# Patient Record
Sex: MALE | Race: BLACK OR AFRICAN AMERICAN | NOT HISPANIC OR LATINO | URBAN - METROPOLITAN AREA
[De-identification: names, ages, dates, MRNs, and addresses within clinical notes are randomized per-mention and may not be internally consistent; named-entity substitution may affect disease eponyms.]

---

## 2017-05-09 ENCOUNTER — INPATIENT (INPATIENT)
Facility: HOSPITAL | Age: 52
LOS: 0 days | Discharge: LEFT BEFORE DISCHARGE | End: 2017-05-10
Attending: SPECIALIST | Admitting: SPECIALIST
Payer: COMMERCIAL

## 2017-05-09 VITALS
TEMPERATURE: 98 F | OXYGEN SATURATION: 100 % | RESPIRATION RATE: 18 BRPM | HEART RATE: 80 BPM | DIASTOLIC BLOOD PRESSURE: 75 MMHG | SYSTOLIC BLOOD PRESSURE: 135 MMHG

## 2017-05-09 DIAGNOSIS — M62.89 OTHER SPECIFIED DISORDERS OF MUSCLE: ICD-10-CM

## 2017-05-09 LAB
ALBUMIN SERPL ELPH-MCNC: 3.7 G/DL — SIGNIFICANT CHANGE UP (ref 3.3–5)
ALP SERPL-CCNC: 28 U/L — LOW (ref 40–120)
ALT FLD-CCNC: 30 U/L — SIGNIFICANT CHANGE UP (ref 4–41)
APPEARANCE UR: CLEAR — SIGNIFICANT CHANGE UP
AST SERPL-CCNC: 34 U/L — SIGNIFICANT CHANGE UP (ref 4–40)
BASE EXCESS BLDV CALC-SCNC: 5.8 MMOL/L — SIGNIFICANT CHANGE UP
BASOPHILS # BLD AUTO: 0.02 K/UL — SIGNIFICANT CHANGE UP (ref 0–0.2)
BASOPHILS NFR BLD AUTO: 0.3 % — SIGNIFICANT CHANGE UP (ref 0–2)
BILIRUB SERPL-MCNC: 0.3 MG/DL — SIGNIFICANT CHANGE UP (ref 0.2–1.2)
BILIRUB UR-MCNC: NEGATIVE — SIGNIFICANT CHANGE UP
BLOOD GAS VENOUS - CREATININE: 1.28 MG/DL — SIGNIFICANT CHANGE UP (ref 0.5–1.3)
BLOOD UR QL VISUAL: NEGATIVE — SIGNIFICANT CHANGE UP
BUN SERPL-MCNC: 11 MG/DL — SIGNIFICANT CHANGE UP (ref 7–23)
CALCIUM SERPL-MCNC: 8.9 MG/DL — SIGNIFICANT CHANGE UP (ref 8.4–10.5)
CHLORIDE BLDV-SCNC: 100 MMOL/L — SIGNIFICANT CHANGE UP (ref 96–108)
CHLORIDE SERPL-SCNC: 96 MMOL/L — LOW (ref 98–107)
CK SERPL-CCNC: 391 U/L — HIGH (ref 30–200)
CO2 SERPL-SCNC: 24 MMOL/L — SIGNIFICANT CHANGE UP (ref 22–31)
COLOR SPEC: SIGNIFICANT CHANGE UP
CREAT SERPL-MCNC: 1.2 MG/DL — SIGNIFICANT CHANGE UP (ref 0.5–1.3)
CRP SERPL-MCNC: 100.8 MG/L — HIGH (ref 0.3–5)
EOSINOPHIL # BLD AUTO: 0.15 K/UL — SIGNIFICANT CHANGE UP (ref 0–0.5)
EOSINOPHIL NFR BLD AUTO: 2 % — SIGNIFICANT CHANGE UP (ref 0–6)
ERYTHROCYTE [SEDIMENTATION RATE] IN BLOOD: 20 MM/HR — HIGH (ref 1–15)
GAS PNL BLDV: 135 MMOL/L — LOW (ref 136–146)
GLUCOSE BLDV-MCNC: 93 — SIGNIFICANT CHANGE UP (ref 70–99)
GLUCOSE SERPL-MCNC: 92 MG/DL — SIGNIFICANT CHANGE UP (ref 70–99)
GLUCOSE UR-MCNC: NEGATIVE — SIGNIFICANT CHANGE UP
HCO3 BLDV-SCNC: 27 MMOL/L — SIGNIFICANT CHANGE UP (ref 20–27)
HCT VFR BLD CALC: 47.4 % — SIGNIFICANT CHANGE UP (ref 39–50)
HCT VFR BLDV CALC: 49.9 % — SIGNIFICANT CHANGE UP (ref 39–51)
HGB BLD-MCNC: 16.2 G/DL — SIGNIFICANT CHANGE UP (ref 13–17)
HGB BLDV-MCNC: 16.3 G/DL — SIGNIFICANT CHANGE UP (ref 13–17)
IMM GRANULOCYTES NFR BLD AUTO: 0.1 % — SIGNIFICANT CHANGE UP (ref 0–1.5)
KETONES UR-MCNC: NEGATIVE — SIGNIFICANT CHANGE UP
LACTATE BLDV-MCNC: 1 MMOL/L — SIGNIFICANT CHANGE UP (ref 0.5–2)
LEUKOCYTE ESTERASE UR-ACNC: NEGATIVE — SIGNIFICANT CHANGE UP
LYMPHOCYTES # BLD AUTO: 1.71 K/UL — SIGNIFICANT CHANGE UP (ref 1–3.3)
LYMPHOCYTES # BLD AUTO: 22.5 % — SIGNIFICANT CHANGE UP (ref 13–44)
MAGNESIUM SERPL-MCNC: 2 MG/DL — SIGNIFICANT CHANGE UP (ref 1.6–2.6)
MCHC RBC-ENTMCNC: 29.8 PG — SIGNIFICANT CHANGE UP (ref 27–34)
MCHC RBC-ENTMCNC: 34.2 % — SIGNIFICANT CHANGE UP (ref 32–36)
MCV RBC AUTO: 87.3 FL — SIGNIFICANT CHANGE UP (ref 80–100)
MONOCYTES # BLD AUTO: 1.19 K/UL — HIGH (ref 0–0.9)
MONOCYTES NFR BLD AUTO: 15.6 % — HIGH (ref 2–14)
NEUTROPHILS # BLD AUTO: 4.53 K/UL — SIGNIFICANT CHANGE UP (ref 1.8–7.4)
NEUTROPHILS NFR BLD AUTO: 59.5 % — SIGNIFICANT CHANGE UP (ref 43–77)
NITRITE UR-MCNC: NEGATIVE — SIGNIFICANT CHANGE UP
PCO2 BLDV: 56 MMHG — HIGH (ref 41–51)
PH BLDV: 7.36 PH — SIGNIFICANT CHANGE UP (ref 7.32–7.43)
PH UR: 7 — SIGNIFICANT CHANGE UP (ref 4.6–8)
PLATELET # BLD AUTO: 240 K/UL — SIGNIFICANT CHANGE UP (ref 150–400)
PMV BLD: 10.6 FL — SIGNIFICANT CHANGE UP (ref 7–13)
PO2 BLDV: 37 MMHG — SIGNIFICANT CHANGE UP (ref 35–40)
POTASSIUM BLDV-SCNC: 5.2 MMOL/L — HIGH (ref 3.4–4.5)
POTASSIUM SERPL-MCNC: 4.5 MMOL/L — SIGNIFICANT CHANGE UP (ref 3.5–5.3)
POTASSIUM SERPL-SCNC: 4.5 MMOL/L — SIGNIFICANT CHANGE UP (ref 3.5–5.3)
PROT SERPL-MCNC: 7.6 G/DL — SIGNIFICANT CHANGE UP (ref 6–8.3)
PROT UR-MCNC: 10 — SIGNIFICANT CHANGE UP
RBC # BLD: 5.43 M/UL — SIGNIFICANT CHANGE UP (ref 4.2–5.8)
RBC # FLD: 14.1 % — SIGNIFICANT CHANGE UP (ref 10.3–14.5)
RBC CASTS # UR COMP ASSIST: SIGNIFICANT CHANGE UP (ref 0–?)
SAO2 % BLDV: 64.3 % — SIGNIFICANT CHANGE UP (ref 60–85)
SODIUM SERPL-SCNC: 135 MMOL/L — SIGNIFICANT CHANGE UP (ref 135–145)
SP GR SPEC: > 1.04 — HIGH (ref 1–1.03)
UROBILINOGEN FLD QL: NORMAL E.U. — SIGNIFICANT CHANGE UP (ref 0.1–0.2)
WBC # BLD: 7.61 K/UL — SIGNIFICANT CHANGE UP (ref 3.8–10.5)
WBC # FLD AUTO: 7.61 K/UL — SIGNIFICANT CHANGE UP (ref 3.8–10.5)

## 2017-05-09 PROCEDURE — 73090 X-RAY EXAM OF FOREARM: CPT | Mod: 26,LT

## 2017-05-09 PROCEDURE — 93971 EXTREMITY STUDY: CPT | Mod: 26

## 2017-05-09 PROCEDURE — 71020: CPT | Mod: 26

## 2017-05-09 PROCEDURE — 73060 X-RAY EXAM OF HUMERUS: CPT | Mod: 26,LT

## 2017-05-09 PROCEDURE — 73201 CT UPPER EXTREMITY W/DYE: CPT | Mod: 26,LT

## 2017-05-09 RX ORDER — SODIUM CHLORIDE 9 MG/ML
3000 INJECTION INTRAMUSCULAR; INTRAVENOUS; SUBCUTANEOUS ONCE
Qty: 0 | Refills: 0 | Status: COMPLETED | OUTPATIENT
Start: 2017-05-09 | End: 2017-05-09

## 2017-05-09 RX ORDER — ACETAMINOPHEN 500 MG
1000 TABLET ORAL ONCE
Qty: 0 | Refills: 0 | Status: COMPLETED | OUTPATIENT
Start: 2017-05-09 | End: 2017-05-09

## 2017-05-09 RX ORDER — PIPERACILLIN AND TAZOBACTAM 4; .5 G/20ML; G/20ML
3.38 INJECTION, POWDER, LYOPHILIZED, FOR SOLUTION INTRAVENOUS ONCE
Qty: 0 | Refills: 0 | Status: COMPLETED | OUTPATIENT
Start: 2017-05-09 | End: 2017-05-09

## 2017-05-09 RX ORDER — SODIUM CHLORIDE 9 MG/ML
1000 INJECTION, SOLUTION INTRAVENOUS
Qty: 0 | Refills: 0 | Status: DISCONTINUED | OUTPATIENT
Start: 2017-05-09 | End: 2017-05-10

## 2017-05-09 RX ORDER — VANCOMYCIN HCL 1 G
1000 VIAL (EA) INTRAVENOUS ONCE
Qty: 0 | Refills: 0 | Status: COMPLETED | OUTPATIENT
Start: 2017-05-09 | End: 2017-05-09

## 2017-05-09 RX ORDER — ENOXAPARIN SODIUM 100 MG/ML
40 INJECTION SUBCUTANEOUS DAILY
Qty: 0 | Refills: 0 | Status: DISCONTINUED | OUTPATIENT
Start: 2017-05-09 | End: 2017-05-10

## 2017-05-09 RX ORDER — HYDROMORPHONE HYDROCHLORIDE 2 MG/ML
1 INJECTION INTRAMUSCULAR; INTRAVENOUS; SUBCUTANEOUS EVERY 4 HOURS
Qty: 0 | Refills: 0 | Status: DISCONTINUED | OUTPATIENT
Start: 2017-05-09 | End: 2017-05-10

## 2017-05-09 RX ORDER — MORPHINE SULFATE 50 MG/1
4 CAPSULE, EXTENDED RELEASE ORAL ONCE
Qty: 0 | Refills: 0 | Status: DISCONTINUED | OUTPATIENT
Start: 2017-05-09 | End: 2017-05-09

## 2017-05-09 RX ADMIN — SODIUM CHLORIDE 1500 MILLILITER(S): 9 INJECTION INTRAMUSCULAR; INTRAVENOUS; SUBCUTANEOUS at 09:07

## 2017-05-09 RX ADMIN — MORPHINE SULFATE 4 MILLIGRAM(S): 50 CAPSULE, EXTENDED RELEASE ORAL at 11:05

## 2017-05-09 RX ADMIN — MORPHINE SULFATE 4 MILLIGRAM(S): 50 CAPSULE, EXTENDED RELEASE ORAL at 11:25

## 2017-05-09 RX ADMIN — HYDROMORPHONE HYDROCHLORIDE 1 MILLIGRAM(S): 2 INJECTION INTRAMUSCULAR; INTRAVENOUS; SUBCUTANEOUS at 19:44

## 2017-05-09 RX ADMIN — Medication 250 MILLIGRAM(S): at 11:05

## 2017-05-09 RX ADMIN — MORPHINE SULFATE 4 MILLIGRAM(S): 50 CAPSULE, EXTENDED RELEASE ORAL at 07:39

## 2017-05-09 RX ADMIN — MORPHINE SULFATE 4 MILLIGRAM(S): 50 CAPSULE, EXTENDED RELEASE ORAL at 09:47

## 2017-05-09 RX ADMIN — Medication 100 MILLIGRAM(S): at 09:07

## 2017-05-09 RX ADMIN — Medication 1000 MILLIGRAM(S): at 07:39

## 2017-05-09 RX ADMIN — SODIUM CHLORIDE 125 MILLILITER(S): 9 INJECTION, SOLUTION INTRAVENOUS at 14:02

## 2017-05-09 RX ADMIN — HYDROMORPHONE HYDROCHLORIDE 1 MILLIGRAM(S): 2 INJECTION INTRAMUSCULAR; INTRAVENOUS; SUBCUTANEOUS at 18:44

## 2017-05-09 RX ADMIN — Medication 400 MILLIGRAM(S): at 07:13

## 2017-05-09 RX ADMIN — PIPERACILLIN AND TAZOBACTAM 200 GRAM(S): 4; .5 INJECTION, POWDER, LYOPHILIZED, FOR SOLUTION INTRAVENOUS at 10:08

## 2017-05-09 RX ADMIN — HYDROMORPHONE HYDROCHLORIDE 1 MILLIGRAM(S): 2 INJECTION INTRAMUSCULAR; INTRAVENOUS; SUBCUTANEOUS at 14:34

## 2017-05-09 RX ADMIN — ENOXAPARIN SODIUM 40 MILLIGRAM(S): 100 INJECTION SUBCUTANEOUS at 18:44

## 2017-05-09 RX ADMIN — HYDROMORPHONE HYDROCHLORIDE 1 MILLIGRAM(S): 2 INJECTION INTRAMUSCULAR; INTRAVENOUS; SUBCUTANEOUS at 14:02

## 2017-05-09 RX ADMIN — HYDROMORPHONE HYDROCHLORIDE 1 MILLIGRAM(S): 2 INJECTION INTRAMUSCULAR; INTRAVENOUS; SUBCUTANEOUS at 23:45

## 2017-05-09 RX ADMIN — MORPHINE SULFATE 4 MILLIGRAM(S): 50 CAPSULE, EXTENDED RELEASE ORAL at 09:07

## 2017-05-09 RX ADMIN — MORPHINE SULFATE 4 MILLIGRAM(S): 50 CAPSULE, EXTENDED RELEASE ORAL at 07:08

## 2017-05-09 NOTE — H&P ADULT. - ASSESSMENT
Assessment:	This patient is assessed as being a 51 year-old previously healthy male who has complaints of having LUE swelling and pain consistent with a intramuscular hematoma

## 2017-05-09 NOTE — ED PROVIDER NOTE - SHIFT CHANGE DETAILS
I have signed over this patient to the above attending physician. Pertinent history, physical exam findings and workup thus far in the ED have been discussed. The pending tests and plan, including duplex, ct were signed over.  All questions from the above attending physician have been answered.

## 2017-05-09 NOTE — ED PROCEDURE NOTE - PROCEDURE ADDITIONAL DETAILS
11640, ultrasound, soft tissue, limited.    Focused ED ultrasound of the soft tissue to evaluate for abscess:  location: left tricep  focal hypoechoic lesion, well circumscrbied with internal echoes, appears in the muscle    Impression: likely intramuscular abscess of left tricep    Procedure note and images placed in patient's chart

## 2017-05-09 NOTE — ED PROVIDER NOTE - PROGRESS NOTE DETAILS
ATTG NOTE DR. BERNABE Pt assessed and c/o pain to extremity, no new numbness or tingling.  +erythema to skin and warmth, 2+radial, no cool extremity.  Will expedite CT and call orthopedics/surgery consult to eval for compartment syndrome.  WIll obtain lactate, give 30cc/kg fluids.

## 2017-05-09 NOTE — ED PROVIDER NOTE - MEDICAL DECISION MAKING DETAILS
52 yo M w/ hx of GERD c/o left arm pain starting last Thursday, after working out his triceps. Pt doesn't recall pulling any muscle, reports swelling, redness, warmth from the triceps down to the forearm. Pt also reports subjective fever and chills. Denies any trauma to the arm, recent travel, hx of blood clots. Pt also reports Left ear pain, muffled sounds, but ringing or discharge. Denies any chest pain, sob or any other symptoms.   - Labs, US, CT, pain meds

## 2017-05-09 NOTE — ED PROVIDER NOTE - OBJECTIVE STATEMENT
52 yo M w/ hx of GERD c/o left arm pain starting last Thursday, after working out his triceps. Pt doesn't recall pulling any muscle, reports swelling, redness, warmth from the triceps down to the forearm. Pt also reports subjective fever and chills. Denies any trauma to the arm, recent travel, hx of blood clots. Pt also reports Left ear pain, muffled sounds, but ringing or discharge. Denies any chest pain, sob or any other symptoms.

## 2017-05-09 NOTE — ED ADULT NURSE NOTE - OBJECTIVE STATEMENT
pt. came in c/o swelling and pain on left arm since Thursday and is getting worse and that arm feels warm. pt. states he was working out Thursday and started having the pain on arm.  pt. also c/o pain on his rt hand started today, had felt nausea last weekend and felt feverish and chills yesterday. pt. has also been c/o muffled sound heard on left ear, denies any ear discharges nor trauma. pt. denies any trauma nor numbness, good radial pulses noted on b/l arm, with good cap refill. pt. had hx of fever, cough and colds 2 weeks ago and took zpak with relief. pt. currently denies any nausea, no vomiting. MD at bedside to further eval pt. will continue to provide nsg care

## 2017-05-09 NOTE — ED ADULT TRIAGE NOTE - CHIEF COMPLAINT QUOTE
Pt c/o multiple symptoms starting approx 2 days ago after taking a Z-pack for cold symptoms.  Pt c/o L ear pain, fever, nausea, and L arm swelling.  L arm noticeably more swollen than R arm.  Feels tight, skin taut.  Denies injury to affected to extremity.   +radial pulse, +ROM.  Denies any chest pain/SOB.  Respirations even and unlabored in triage.  PMHx:  GERD

## 2017-05-09 NOTE — ED PROVIDER NOTE - NOTES
Case discussed with orthopedics who states we should consult surgery to evaluate for soft tissue abscess.

## 2017-05-09 NOTE — H&P ADULT. - HISTORY OF PRESENT ILLNESS
This is a very pleasant 51 year old male who presents with left upper extremity swelling and pain which began the Thursday prior to admission, approximately 6 days. He states he was weight lifting when he first noticed the swelling the day following his session. This has never occurred before. He describes the pain as sharp non radiating and constant 6/10. No alleviating factors could be elicited. He denies any recent trauma or injections to that area. The pain was with fever or chills. He has not had any recent sick exposures and denied having had foreign travel. He has not had urinary symptoms of urgency, frequency or dysuria.     He has no significant medical history except GERD.  He has not previously had any surgery. He denied having had cardiovascular, pulmonary, renal, hepatic, hematologic, CNS, endocrine and / or neoplastic illnesses. Prior to the current hospitalization He did not take any medications. He has no known medication allergies and he is not allergic to latex. He does not use ethanol or tobacco.    CT LUE with contrast demonstrated: Broad elongated flat rim-enhancing fluid collection in the perifascial region over the mid to distal posterior triceps with suggestion of vague internal foci of amorphous increased attenuation material could be compatible with hematoma, however, concomitant superimposed infection/abscess cannot be entirely excluded. No tracking gas collections or additional fluid collections. Mild swelling and stranding in the adjacent subcutaneous tissues compatible with associated inflammatory reaction. No CT evidence for osteomyelitis or septic arthritis. Incidentally observed thinly encapsulated simple lipoma in the upper posterior left shoulder subcutaneous tissues over the posterior deltoid and teres minor muscles.    He was awake, alert and in no distress  He was anicteric and he did not have thrush. His oropharyngeal mucosa was normal. He had reactive pupils and his extra-occular movements were intact. He did not have JVD. His lungs were clear bilaterally and He had non-labored respirations. He had symmetrical chest wall movements. He had regular heart tones and he did not have a murmur or gallop. His abdomen was soft, non distended without tenderness  There were no palpable masses or abdominal wall hernias. He had active bowel sounds. He had normal external genitalia. He did not have a rash. His extremities were well perfused. He had left upper extremity erythema, swelling, and tenderness of the posterior upper arm.     A 22 gauge IV cannula was inserted into a vein of his right arm and fluid was given.     Assessment:	This patient is assessed as being a 51 year-old previously healthy male who has complaints of having LUE swelling and pain consistent with a intramuscular hematoma     Plan to:  1)	Admit him to the [facility] under Dr. Arita   2) 	Keep him NPO and give parenteral fluid to treat his dehydration.  3)	Treat him with parenteral antibiotics pre-operatively,  Zosyn 3.375mg IV Q8 hours and Vancomycin   4)	Give narcotics to treat his acute pain PRN  5)	Plan to have him undergo a bedside verus operative surgical incision and debridement   6)	Full support in place

## 2017-05-09 NOTE — H&P ADULT. - PROBLEM SELECTOR PLAN 1
Plan to:  1)	Admit him to the [facility] under Dr. Arita   2) 	Keep him NPO and give parenteral fluid to treat his dehydration.  3)	Treat him with parenteral antibiotics pre-operatively,  Zosyn 3.375mg IV Q8 hours and Vancomycin   4)	Give narcotics to treat his acute pain PRN  5)	Plan to have him undergo a bedside verus operative surgical incision and debridement   6)	Full support in place

## 2017-05-09 NOTE — ED PROVIDER NOTE - ATTENDING CONTRIBUTION TO CARE
51m, pmhx gerd. p/w 2 days of left arm swelling, pain and erythema over the triceps. also notes uri symptoms x 1 week and subjective fevers intermittently over the week. no ivdu. no wt loss. pt goes to the gym daily but does not recall any injury. exam, vs wnl, nad. hs and lungs normaal, abdo benign. there is tenderness, edema and erythema over the tricep, with some mild swelling to left arm. pulses intact. rest  of extremities normal, neuro non-focal. bedside us shows possible intramuscular abscess to left tricep. therefore will do bloods with esr, crp, ck, CT of arm and LUE duplex. pain control.

## 2017-05-10 ENCOUNTER — INPATIENT (INPATIENT)
Facility: HOSPITAL | Age: 52
LOS: 1 days | Discharge: LEFT BEFORE DISCHARGE | End: 2017-05-12
Attending: SPECIALIST | Admitting: SPECIALIST

## 2017-05-10 VITALS
OXYGEN SATURATION: 94 % | DIASTOLIC BLOOD PRESSURE: 77 MMHG | SYSTOLIC BLOOD PRESSURE: 149 MMHG | HEART RATE: 55 BPM | TEMPERATURE: 98 F | RESPIRATION RATE: 17 BRPM

## 2017-05-10 VITALS
DIASTOLIC BLOOD PRESSURE: 88 MMHG | TEMPERATURE: 98 F | RESPIRATION RATE: 16 BRPM | SYSTOLIC BLOOD PRESSURE: 161 MMHG | HEART RATE: 58 BPM | OXYGEN SATURATION: 100 %

## 2017-05-10 DIAGNOSIS — L02.419 CUTANEOUS ABSCESS OF LIMB, UNSPECIFIED: ICD-10-CM

## 2017-05-10 LAB
ALBUMIN SERPL ELPH-MCNC: 3.8 G/DL — SIGNIFICANT CHANGE UP (ref 3.3–5)
ALP SERPL-CCNC: 38 U/L — LOW (ref 40–120)
ALT FLD-CCNC: 24 U/L — SIGNIFICANT CHANGE UP (ref 4–41)
AST SERPL-CCNC: 30 U/L — SIGNIFICANT CHANGE UP (ref 4–40)
BASE EXCESS BLDV CALC-SCNC: 6.4 MMOL/L — SIGNIFICANT CHANGE UP
BASOPHILS # BLD AUTO: 0.04 K/UL — SIGNIFICANT CHANGE UP (ref 0–0.2)
BASOPHILS NFR BLD AUTO: 0.8 % — SIGNIFICANT CHANGE UP (ref 0–2)
BILIRUB SERPL-MCNC: 0.3 MG/DL — SIGNIFICANT CHANGE UP (ref 0.2–1.2)
BLOOD GAS VENOUS - CREATININE: 1.34 MG/DL — HIGH (ref 0.5–1.3)
BUN SERPL-MCNC: 12 MG/DL — SIGNIFICANT CHANGE UP (ref 7–23)
CALCIUM SERPL-MCNC: 9.3 MG/DL — SIGNIFICANT CHANGE UP (ref 8.4–10.5)
CHLORIDE BLDV-SCNC: 101 MMOL/L — SIGNIFICANT CHANGE UP (ref 96–108)
CHLORIDE SERPL-SCNC: 100 MMOL/L — SIGNIFICANT CHANGE UP (ref 98–107)
CO2 SERPL-SCNC: 27 MMOL/L — SIGNIFICANT CHANGE UP (ref 22–31)
CREAT SERPL-MCNC: 1.46 MG/DL — HIGH (ref 0.5–1.3)
EOSINOPHIL # BLD AUTO: 0.12 K/UL — SIGNIFICANT CHANGE UP (ref 0–0.5)
EOSINOPHIL NFR BLD AUTO: 2.5 % — SIGNIFICANT CHANGE UP (ref 0–6)
GAS PNL BLDV: 140 MMOL/L — SIGNIFICANT CHANGE UP (ref 136–146)
GLUCOSE BLDV-MCNC: 78 — SIGNIFICANT CHANGE UP (ref 70–99)
GLUCOSE SERPL-MCNC: 75 MG/DL — SIGNIFICANT CHANGE UP (ref 70–99)
HCO3 BLDV-SCNC: 26 MMOL/L — SIGNIFICANT CHANGE UP (ref 20–27)
HCT VFR BLD CALC: 45.7 % — SIGNIFICANT CHANGE UP (ref 39–50)
HCT VFR BLDV CALC: 49.5 % — SIGNIFICANT CHANGE UP (ref 39–51)
HGB BLD-MCNC: 15.6 G/DL — SIGNIFICANT CHANGE UP (ref 13–17)
HGB BLDV-MCNC: 16.2 G/DL — SIGNIFICANT CHANGE UP (ref 13–17)
IMM GRANULOCYTES NFR BLD AUTO: 0.2 % — SIGNIFICANT CHANGE UP (ref 0–1.5)
LACTATE BLDV-MCNC: 2.7 MMOL/L — HIGH (ref 0.5–2)
LYMPHOCYTES # BLD AUTO: 1.45 K/UL — SIGNIFICANT CHANGE UP (ref 1–3.3)
LYMPHOCYTES # BLD AUTO: 30.4 % — SIGNIFICANT CHANGE UP (ref 13–44)
MCHC RBC-ENTMCNC: 30 PG — SIGNIFICANT CHANGE UP (ref 27–34)
MCHC RBC-ENTMCNC: 34.1 % — SIGNIFICANT CHANGE UP (ref 32–36)
MCV RBC AUTO: 87.9 FL — SIGNIFICANT CHANGE UP (ref 80–100)
MONOCYTES # BLD AUTO: 0.77 K/UL — SIGNIFICANT CHANGE UP (ref 0–0.9)
MONOCYTES NFR BLD AUTO: 16.1 % — HIGH (ref 2–14)
NEUTROPHILS # BLD AUTO: 2.38 K/UL — SIGNIFICANT CHANGE UP (ref 1.8–7.4)
NEUTROPHILS NFR BLD AUTO: 50 % — SIGNIFICANT CHANGE UP (ref 43–77)
PCO2 BLDV: 76 MMHG — HIGH (ref 41–51)
PH BLDV: 7.26 PH — LOW (ref 7.32–7.43)
PLATELET # BLD AUTO: 265 K/UL — SIGNIFICANT CHANGE UP (ref 150–400)
PMV BLD: 10.9 FL — SIGNIFICANT CHANGE UP (ref 7–13)
PO2 BLDV: 40 MMHG — SIGNIFICANT CHANGE UP (ref 35–40)
POTASSIUM BLDV-SCNC: 4 MMOL/L — SIGNIFICANT CHANGE UP (ref 3.4–4.5)
POTASSIUM SERPL-MCNC: 4.5 MMOL/L — SIGNIFICANT CHANGE UP (ref 3.5–5.3)
POTASSIUM SERPL-SCNC: 4.5 MMOL/L — SIGNIFICANT CHANGE UP (ref 3.5–5.3)
PROT SERPL-MCNC: 7.6 G/DL — SIGNIFICANT CHANGE UP (ref 6–8.3)
RBC # BLD: 5.2 M/UL — SIGNIFICANT CHANGE UP (ref 4.2–5.8)
RBC # FLD: 13.7 % — SIGNIFICANT CHANGE UP (ref 10.3–14.5)
SAO2 % BLDV: 59.3 % — LOW (ref 60–85)
SODIUM SERPL-SCNC: 144 MMOL/L — SIGNIFICANT CHANGE UP (ref 135–145)
WBC # BLD: 4.77 K/UL — SIGNIFICANT CHANGE UP (ref 3.8–10.5)
WBC # FLD AUTO: 4.77 K/UL — SIGNIFICANT CHANGE UP (ref 3.8–10.5)

## 2017-05-10 RX ORDER — SODIUM CHLORIDE 9 MG/ML
1000 INJECTION INTRAMUSCULAR; INTRAVENOUS; SUBCUTANEOUS ONCE
Qty: 0 | Refills: 0 | Status: COMPLETED | OUTPATIENT
Start: 2017-05-10 | End: 2017-05-10

## 2017-05-10 RX ORDER — MORPHINE SULFATE 50 MG/1
4 CAPSULE, EXTENDED RELEASE ORAL ONCE
Qty: 0 | Refills: 0 | Status: DISCONTINUED | OUTPATIENT
Start: 2017-05-10 | End: 2017-05-10

## 2017-05-10 RX ORDER — VANCOMYCIN HCL 1 G
1000 VIAL (EA) INTRAVENOUS ONCE
Qty: 0 | Refills: 0 | Status: COMPLETED | OUTPATIENT
Start: 2017-05-10 | End: 2017-05-10

## 2017-05-10 RX ORDER — HYDROMORPHONE HYDROCHLORIDE 2 MG/ML
1 INJECTION INTRAMUSCULAR; INTRAVENOUS; SUBCUTANEOUS ONCE
Qty: 0 | Refills: 0 | Status: DISCONTINUED | OUTPATIENT
Start: 2017-05-10 | End: 2017-05-10

## 2017-05-10 RX ORDER — PIPERACILLIN AND TAZOBACTAM 4; .5 G/20ML; G/20ML
3.38 INJECTION, POWDER, LYOPHILIZED, FOR SOLUTION INTRAVENOUS ONCE
Qty: 0 | Refills: 0 | Status: COMPLETED | OUTPATIENT
Start: 2017-05-10 | End: 2017-05-10

## 2017-05-10 RX ADMIN — SODIUM CHLORIDE 1000 MILLILITER(S): 9 INJECTION INTRAMUSCULAR; INTRAVENOUS; SUBCUTANEOUS at 19:00

## 2017-05-10 RX ADMIN — HYDROMORPHONE HYDROCHLORIDE 1 MILLIGRAM(S): 2 INJECTION INTRAMUSCULAR; INTRAVENOUS; SUBCUTANEOUS at 19:00

## 2017-05-10 RX ADMIN — HYDROMORPHONE HYDROCHLORIDE 1 MILLIGRAM(S): 2 INJECTION INTRAMUSCULAR; INTRAVENOUS; SUBCUTANEOUS at 19:58

## 2017-05-10 RX ADMIN — Medication 250 MILLIGRAM(S): at 19:40

## 2017-05-10 RX ADMIN — HYDROMORPHONE HYDROCHLORIDE 1 MILLIGRAM(S): 2 INJECTION INTRAMUSCULAR; INTRAVENOUS; SUBCUTANEOUS at 06:55

## 2017-05-10 RX ADMIN — PIPERACILLIN AND TAZOBACTAM 200 GRAM(S): 4; .5 INJECTION, POWDER, LYOPHILIZED, FOR SOLUTION INTRAVENOUS at 19:00

## 2017-05-10 RX ADMIN — HYDROMORPHONE HYDROCHLORIDE 1 MILLIGRAM(S): 2 INJECTION INTRAMUSCULAR; INTRAVENOUS; SUBCUTANEOUS at 00:00

## 2017-05-10 RX ADMIN — HYDROMORPHONE HYDROCHLORIDE 1 MILLIGRAM(S): 2 INJECTION INTRAMUSCULAR; INTRAVENOUS; SUBCUTANEOUS at 06:10

## 2017-05-10 NOTE — ED ADULT TRIAGE NOTE - CHIEF COMPLAINT QUOTE
Returns to ED after eloping from the 7S. Pt left in IV access in place. Right forearm IV removed in triage. C/o continued left arm pain and abscess which he was being treated for on the floor. Pt was admitted to A team [id number]. Scheduled for surgery.

## 2017-05-10 NOTE — ED PROVIDER NOTE - OBJECTIVE STATEMENT
51 year old male, PMHx of GERD; presents to the ED after eloping from admission. Pt was seen by myself in the ED yesterday. He complains of worsening left arm pain and swelling since Thursday. He was found to have a left intramuscular abscess with surrounding cellulitis and admitted to surgery for IVAbx and drainage. Pt states today he was just receiving pain medications and he felt like they weren't doing anything so he eloped (with his IV). He states he spoke to his PMD and his family who convinced him to return. The IV was removed by triage upon arrival. He states he thinks the swelling has gotten worse and the pain is the same. He denies fevers, chills, nausea, vomiting, diarrhea, IV drug use, IM injections, further decreased ROM or other complaints.

## 2017-05-10 NOTE — ED PROVIDER NOTE - MUSCULOSKELETAL MINIMAL EXAM
RANGE OF MOTION LIMITED/range of motion slightly limited in flexion of the elbow, otherwise normal exam. NVI

## 2017-05-10 NOTE — ED PROVIDER NOTE - SKIN COLOR
normal for race/Erythematous, warm, Large area of cellulitis extending from proximal posterior arm to distal wrist. Palpable area of induration, no fluctuance at bicep ~6cm x 4cm

## 2017-05-10 NOTE — ED PROVIDER NOTE - MEDICAL DECISION MAKING DETAILS
51 year old male w/ IM abscess admitted to a team [id number], will contact to readmit, redraw labs, dose of IVabx, and pain  control.

## 2017-05-11 RX ORDER — MORPHINE SULFATE 50 MG/1
4 CAPSULE, EXTENDED RELEASE ORAL EVERY 4 HOURS
Qty: 0 | Refills: 0 | Status: DISCONTINUED | OUTPATIENT
Start: 2017-05-11 | End: 2017-05-11

## 2017-05-11 RX ORDER — CEFAZOLIN SODIUM 1 G
1000 VIAL (EA) INJECTION ONCE
Qty: 0 | Refills: 0 | Status: COMPLETED | OUTPATIENT
Start: 2017-05-11 | End: 2017-05-11

## 2017-05-11 RX ORDER — HYDROMORPHONE HYDROCHLORIDE 2 MG/ML
1 INJECTION INTRAMUSCULAR; INTRAVENOUS; SUBCUTANEOUS EVERY 4 HOURS
Qty: 0 | Refills: 0 | Status: DISCONTINUED | OUTPATIENT
Start: 2017-05-11 | End: 2017-05-12

## 2017-05-11 RX ORDER — CEFAZOLIN SODIUM 1 G
VIAL (EA) INJECTION
Qty: 0 | Refills: 0 | Status: DISCONTINUED | OUTPATIENT
Start: 2017-05-11 | End: 2017-05-12

## 2017-05-11 RX ORDER — DEXTROSE MONOHYDRATE, SODIUM CHLORIDE, AND POTASSIUM CHLORIDE 50; .745; 4.5 G/1000ML; G/1000ML; G/1000ML
1000 INJECTION, SOLUTION INTRAVENOUS
Qty: 0 | Refills: 0 | Status: DISCONTINUED | OUTPATIENT
Start: 2017-05-11 | End: 2017-05-12

## 2017-05-11 RX ORDER — CEFAZOLIN SODIUM 1 G
1000 VIAL (EA) INJECTION EVERY 8 HOURS
Qty: 0 | Refills: 0 | Status: DISCONTINUED | OUTPATIENT
Start: 2017-05-11 | End: 2017-05-12

## 2017-05-11 RX ORDER — SODIUM CHLORIDE 9 MG/ML
1000 INJECTION, SOLUTION INTRAVENOUS
Qty: 0 | Refills: 0 | Status: DISCONTINUED | OUTPATIENT
Start: 2017-05-11 | End: 2017-05-11

## 2017-05-11 RX ORDER — MORPHINE SULFATE 50 MG/1
2 CAPSULE, EXTENDED RELEASE ORAL EVERY 4 HOURS
Qty: 0 | Refills: 0 | Status: DISCONTINUED | OUTPATIENT
Start: 2017-05-11 | End: 2017-05-11

## 2017-05-11 RX ORDER — HYDROMORPHONE HYDROCHLORIDE 2 MG/ML
0.5 INJECTION INTRAMUSCULAR; INTRAVENOUS; SUBCUTANEOUS EVERY 4 HOURS
Qty: 0 | Refills: 0 | Status: DISCONTINUED | OUTPATIENT
Start: 2017-05-11 | End: 2017-05-12

## 2017-05-11 RX ORDER — HEPARIN SODIUM 5000 [USP'U]/ML
5000 INJECTION INTRAVENOUS; SUBCUTANEOUS EVERY 8 HOURS
Qty: 0 | Refills: 0 | Status: DISCONTINUED | OUTPATIENT
Start: 2017-05-11 | End: 2017-05-12

## 2017-05-11 RX ADMIN — Medication 100 MILLIGRAM(S): at 15:33

## 2017-05-11 RX ADMIN — HYDROMORPHONE HYDROCHLORIDE 1 MILLIGRAM(S): 2 INJECTION INTRAMUSCULAR; INTRAVENOUS; SUBCUTANEOUS at 19:41

## 2017-05-11 RX ADMIN — HEPARIN SODIUM 5000 UNIT(S): 5000 INJECTION INTRAVENOUS; SUBCUTANEOUS at 22:53

## 2017-05-11 RX ADMIN — DEXTROSE MONOHYDRATE, SODIUM CHLORIDE, AND POTASSIUM CHLORIDE 100 MILLILITER(S): 50; .745; 4.5 INJECTION, SOLUTION INTRAVENOUS at 20:00

## 2017-05-11 RX ADMIN — SODIUM CHLORIDE 125 MILLILITER(S): 9 INJECTION, SOLUTION INTRAVENOUS at 11:40

## 2017-05-11 RX ADMIN — Medication 100 MILLIGRAM(S): at 04:46

## 2017-05-11 RX ADMIN — Medication 100 MILLIGRAM(S): at 22:52

## 2017-05-11 RX ADMIN — SODIUM CHLORIDE 125 MILLILITER(S): 9 INJECTION, SOLUTION INTRAVENOUS at 04:22

## 2017-05-11 RX ADMIN — HYDROMORPHONE HYDROCHLORIDE 1 MILLIGRAM(S): 2 INJECTION INTRAMUSCULAR; INTRAVENOUS; SUBCUTANEOUS at 19:18

## 2017-05-11 RX ADMIN — HEPARIN SODIUM 5000 UNIT(S): 5000 INJECTION INTRAVENOUS; SUBCUTANEOUS at 06:18

## 2017-05-11 RX ADMIN — MORPHINE SULFATE 4 MILLIGRAM(S): 50 CAPSULE, EXTENDED RELEASE ORAL at 04:44

## 2017-05-11 RX ADMIN — MORPHINE SULFATE 4 MILLIGRAM(S): 50 CAPSULE, EXTENDED RELEASE ORAL at 05:00

## 2017-05-11 NOTE — H&P ADULT. - HISTORY OF PRESENT ILLNESS
This is a very pleasant 51 year old male who presents with left upper extremity swelling and pain which began the Thursday prior to admission, approximately 6 days. He states he was weight lifting when he first noticed the swelling the day following his session. This has never occurred before. He describes the pain as sharp non radiating and constant 6/10. No alleviating factors could be elicited. He denies any recent trauma or injections to that area. The pain was with fever or chills. He has not had any recent sick exposures and denied having had foreign travel. He has not had urinary symptoms of urgency, frequency or dysuria.     He has no significant medical history except GERD.  He has not previously had any surgery. He denied having had cardiovascular, pulmonary, renal, hepatic, hematologic, CNS, endocrine and / or neoplastic illnesses. Prior to the current hospitalization He did not take any medications. He has no known medication allergies and he is not allergic to latex. He does not use ethanol or tobacco.    CT LUE with contrast demonstrated: Broad elongated flat rim-enhancing fluid collection in the perifascial region over the mid to distal posterior triceps with suggestion of vague internal foci of amorphous increased attenuation material could be compatible with hematoma, however, concomitant superimposed infection/abscess cannot be entirely excluded. No tracking gas collections or additional fluid collections. Mild swelling and stranding in the adjacent subcutaneous tissues compatible with associated inflammatory reaction. No CT evidence for osteomyelitis or septic arthritis. Incidentally observed thinly encapsulated simple lipoma in the upper posterior left shoulder subcutaneous tissues over the posterior deltoid and teres minor muscles.    He was admitted yesterday but left AMA. He returned to the ED with the same complaints.

## 2017-05-12 VITALS
OXYGEN SATURATION: 99 % | RESPIRATION RATE: 17 BRPM | HEART RATE: 70 BPM | SYSTOLIC BLOOD PRESSURE: 120 MMHG | DIASTOLIC BLOOD PRESSURE: 79 MMHG | TEMPERATURE: 99 F

## 2017-05-12 LAB
BUN SERPL-MCNC: 12 MG/DL — SIGNIFICANT CHANGE UP (ref 7–23)
BUN SERPL-MCNC: 12 MG/DL — SIGNIFICANT CHANGE UP (ref 7–23)
CALCIUM SERPL-MCNC: 8.8 MG/DL — SIGNIFICANT CHANGE UP (ref 8.4–10.5)
CALCIUM SERPL-MCNC: 8.8 MG/DL — SIGNIFICANT CHANGE UP (ref 8.4–10.5)
CHLORIDE SERPL-SCNC: 103 MMOL/L — SIGNIFICANT CHANGE UP (ref 98–107)
CHLORIDE SERPL-SCNC: 103 MMOL/L — SIGNIFICANT CHANGE UP (ref 98–107)
CO2 SERPL-SCNC: 23 MMOL/L — SIGNIFICANT CHANGE UP (ref 22–31)
CO2 SERPL-SCNC: 23 MMOL/L — SIGNIFICANT CHANGE UP (ref 22–31)
CREAT SERPL-MCNC: 1.36 MG/DL — HIGH (ref 0.5–1.3)
CREAT SERPL-MCNC: 1.36 MG/DL — HIGH (ref 0.5–1.3)
GLUCOSE SERPL-MCNC: 105 MG/DL — HIGH (ref 70–99)
GLUCOSE SERPL-MCNC: 105 MG/DL — HIGH (ref 70–99)
HCT VFR BLD CALC: 45.9 % — SIGNIFICANT CHANGE UP (ref 39–50)
HGB BLD-MCNC: 15.8 G/DL — SIGNIFICANT CHANGE UP (ref 13–17)
MAGNESIUM SERPL-MCNC: 2.1 MG/DL — SIGNIFICANT CHANGE UP (ref 1.6–2.6)
MCHC RBC-ENTMCNC: 30.1 PG — SIGNIFICANT CHANGE UP (ref 27–34)
MCHC RBC-ENTMCNC: 34.4 % — SIGNIFICANT CHANGE UP (ref 32–36)
MCV RBC AUTO: 87.4 FL — SIGNIFICANT CHANGE UP (ref 80–100)
PHOSPHATE SERPL-MCNC: 3 MG/DL — SIGNIFICANT CHANGE UP (ref 2.5–4.5)
PLATELET # BLD AUTO: 260 K/UL — SIGNIFICANT CHANGE UP (ref 150–400)
PMV BLD: 10.9 FL — SIGNIFICANT CHANGE UP (ref 7–13)
POTASSIUM SERPL-MCNC: 4.4 MMOL/L — SIGNIFICANT CHANGE UP (ref 3.5–5.3)
POTASSIUM SERPL-MCNC: 4.4 MMOL/L — SIGNIFICANT CHANGE UP (ref 3.5–5.3)
POTASSIUM SERPL-SCNC: 4.4 MMOL/L — SIGNIFICANT CHANGE UP (ref 3.5–5.3)
POTASSIUM SERPL-SCNC: 4.4 MMOL/L — SIGNIFICANT CHANGE UP (ref 3.5–5.3)
RBC # BLD: 5.25 M/UL — SIGNIFICANT CHANGE UP (ref 4.2–5.8)
RBC # FLD: 13.7 % — SIGNIFICANT CHANGE UP (ref 10.3–14.5)
SODIUM SERPL-SCNC: 142 MMOL/L — SIGNIFICANT CHANGE UP (ref 135–145)
SODIUM SERPL-SCNC: 142 MMOL/L — SIGNIFICANT CHANGE UP (ref 135–145)
WBC # BLD: 4.18 K/UL — SIGNIFICANT CHANGE UP (ref 3.8–10.5)
WBC # FLD AUTO: 4.18 K/UL — SIGNIFICANT CHANGE UP (ref 3.8–10.5)

## 2017-05-12 RX ORDER — ALPRAZOLAM 0.25 MG
0.5 TABLET ORAL ONCE
Qty: 0 | Refills: 0 | Status: DISCONTINUED | OUTPATIENT
Start: 2017-05-12 | End: 2017-05-12

## 2017-05-12 RX ORDER — OXYCODONE HYDROCHLORIDE 5 MG/1
1 TABLET ORAL
Qty: 20 | Refills: 0 | OUTPATIENT
Start: 2017-05-12

## 2017-05-12 RX ADMIN — HYDROMORPHONE HYDROCHLORIDE 1 MILLIGRAM(S): 2 INJECTION INTRAMUSCULAR; INTRAVENOUS; SUBCUTANEOUS at 12:30

## 2017-05-12 RX ADMIN — HEPARIN SODIUM 5000 UNIT(S): 5000 INJECTION INTRAVENOUS; SUBCUTANEOUS at 06:30

## 2017-05-12 RX ADMIN — Medication 100 MILLIGRAM(S): at 06:30

## 2017-05-12 RX ADMIN — HYDROMORPHONE HYDROCHLORIDE 1 MILLIGRAM(S): 2 INJECTION INTRAMUSCULAR; INTRAVENOUS; SUBCUTANEOUS at 11:58

## 2017-12-05 ENCOUNTER — INPATIENT (INPATIENT)
Facility: HOSPITAL | Age: 52
LOS: 1 days | Discharge: HOME | End: 2017-12-07
Attending: INTERNAL MEDICINE | Admitting: INTERNAL MEDICINE
Payer: COMMERCIAL

## 2017-12-05 VITALS
DIASTOLIC BLOOD PRESSURE: 86 MMHG | TEMPERATURE: 98 F | RESPIRATION RATE: 16 BRPM | HEART RATE: 70 BPM | SYSTOLIC BLOOD PRESSURE: 160 MMHG | OXYGEN SATURATION: 97 %

## 2017-12-05 DIAGNOSIS — K21.9 GASTRO-ESOPHAGEAL REFLUX DISEASE WITHOUT ESOPHAGITIS: ICD-10-CM

## 2017-12-05 DIAGNOSIS — M62.82 RHABDOMYOLYSIS: ICD-10-CM

## 2017-12-05 DIAGNOSIS — N17.9 ACUTE KIDNEY FAILURE, UNSPECIFIED: ICD-10-CM

## 2017-12-05 DIAGNOSIS — R07.89 OTHER CHEST PAIN: ICD-10-CM

## 2017-12-05 DIAGNOSIS — R07.9 CHEST PAIN, UNSPECIFIED: ICD-10-CM

## 2017-12-05 DIAGNOSIS — Z29.9 ENCOUNTER FOR PROPHYLACTIC MEASURES, UNSPECIFIED: ICD-10-CM

## 2017-12-05 LAB
ALBUMIN SERPL ELPH-MCNC: 4 G/DL — SIGNIFICANT CHANGE UP (ref 3.3–5)
ALP SERPL-CCNC: 36 U/L — LOW (ref 40–120)
ALT FLD-CCNC: 28 U/L — SIGNIFICANT CHANGE UP (ref 4–41)
AST SERPL-CCNC: 33 U/L — SIGNIFICANT CHANGE UP (ref 4–40)
BASOPHILS # BLD AUTO: 0.04 K/UL — SIGNIFICANT CHANGE UP (ref 0–0.2)
BASOPHILS NFR BLD AUTO: 1.1 % — SIGNIFICANT CHANGE UP (ref 0–2)
BILIRUB SERPL-MCNC: 0.2 MG/DL — SIGNIFICANT CHANGE UP (ref 0.2–1.2)
BUN SERPL-MCNC: 14 MG/DL — SIGNIFICANT CHANGE UP (ref 7–23)
CALCIUM SERPL-MCNC: 8.7 MG/DL — SIGNIFICANT CHANGE UP (ref 8.4–10.5)
CHLORIDE SERPL-SCNC: 103 MMOL/L — SIGNIFICANT CHANGE UP (ref 98–107)
CK MB BLD-MCNC: 7.04 NG/ML — HIGH (ref 1–6.6)
CK MB BLD-MCNC: 7.79 NG/ML — HIGH (ref 1–6.6)
CK SERPL-CCNC: 598 U/L — HIGH (ref 30–200)
CK SERPL-CCNC: 664 U/L — HIGH (ref 30–200)
CO2 SERPL-SCNC: 26 MMOL/L — SIGNIFICANT CHANGE UP (ref 22–31)
CREAT SERPL-MCNC: 1.42 MG/DL — HIGH (ref 0.5–1.3)
EOSINOPHIL # BLD AUTO: 0.2 K/UL — SIGNIFICANT CHANGE UP (ref 0–0.5)
EOSINOPHIL NFR BLD AUTO: 5.3 % — SIGNIFICANT CHANGE UP (ref 0–6)
GLUCOSE SERPL-MCNC: 96 MG/DL — SIGNIFICANT CHANGE UP (ref 70–99)
HCT VFR BLD CALC: 43.4 % — SIGNIFICANT CHANGE UP (ref 39–50)
HGB BLD-MCNC: 15 G/DL — SIGNIFICANT CHANGE UP (ref 13–17)
HIV1 AG SER QL: SIGNIFICANT CHANGE UP
HIV1+2 AB SPEC QL: SIGNIFICANT CHANGE UP
IMM GRANULOCYTES # BLD AUTO: 0.01 # — SIGNIFICANT CHANGE UP
IMM GRANULOCYTES NFR BLD AUTO: 0.3 % — SIGNIFICANT CHANGE UP (ref 0–1.5)
LYMPHOCYTES # BLD AUTO: 1.69 K/UL — SIGNIFICANT CHANGE UP (ref 1–3.3)
LYMPHOCYTES # BLD AUTO: 44.9 % — HIGH (ref 13–44)
MAGNESIUM SERPL-MCNC: 1.9 MG/DL — SIGNIFICANT CHANGE UP (ref 1.6–2.6)
MCHC RBC-ENTMCNC: 29.3 PG — SIGNIFICANT CHANGE UP (ref 27–34)
MCHC RBC-ENTMCNC: 34.6 % — SIGNIFICANT CHANGE UP (ref 32–36)
MCV RBC AUTO: 84.8 FL — SIGNIFICANT CHANGE UP (ref 80–100)
MONOCYTES # BLD AUTO: 0.51 K/UL — SIGNIFICANT CHANGE UP (ref 0–0.9)
MONOCYTES NFR BLD AUTO: 13.6 % — SIGNIFICANT CHANGE UP (ref 2–14)
NEUTROPHILS # BLD AUTO: 1.31 K/UL — LOW (ref 1.8–7.4)
NEUTROPHILS NFR BLD AUTO: 34.8 % — LOW (ref 43–77)
NRBC # FLD: 0 — SIGNIFICANT CHANGE UP
PHOSPHATE SERPL-MCNC: 3.6 MG/DL — SIGNIFICANT CHANGE UP (ref 2.5–4.5)
PLATELET # BLD AUTO: 182 K/UL — SIGNIFICANT CHANGE UP (ref 150–400)
PMV BLD: 10.7 FL — SIGNIFICANT CHANGE UP (ref 7–13)
POTASSIUM SERPL-MCNC: 3.9 MMOL/L — SIGNIFICANT CHANGE UP (ref 3.5–5.3)
POTASSIUM SERPL-SCNC: 3.9 MMOL/L — SIGNIFICANT CHANGE UP (ref 3.5–5.3)
PROT SERPL-MCNC: 6.9 G/DL — SIGNIFICANT CHANGE UP (ref 6–8.3)
RBC # BLD: 5.12 M/UL — SIGNIFICANT CHANGE UP (ref 4.2–5.8)
RBC # FLD: 13.2 % — SIGNIFICANT CHANGE UP (ref 10.3–14.5)
SODIUM SERPL-SCNC: 140 MMOL/L — SIGNIFICANT CHANGE UP (ref 135–145)
TROPONIN T SERPL-MCNC: < 0.06 NG/ML — SIGNIFICANT CHANGE UP (ref 0–0.06)
TROPONIN T SERPL-MCNC: < 0.06 NG/ML — SIGNIFICANT CHANGE UP (ref 0–0.06)
WBC # BLD: 3.76 K/UL — LOW (ref 3.8–10.5)
WBC # FLD AUTO: 3.76 K/UL — LOW (ref 3.8–10.5)

## 2017-12-05 PROCEDURE — 71020: CPT | Mod: 26

## 2017-12-05 PROCEDURE — 93016 CV STRESS TEST SUPVJ ONLY: CPT | Mod: GC

## 2017-12-05 PROCEDURE — 93010 ELECTROCARDIOGRAM REPORT: CPT

## 2017-12-05 PROCEDURE — 78452 HT MUSCLE IMAGE SPECT MULT: CPT | Mod: 26

## 2017-12-05 PROCEDURE — 93018 CV STRESS TEST I&R ONLY: CPT | Mod: GC

## 2017-12-05 RX ORDER — SODIUM CHLORIDE 9 MG/ML
500 INJECTION INTRAMUSCULAR; INTRAVENOUS; SUBCUTANEOUS ONCE
Qty: 0 | Refills: 0 | Status: COMPLETED | OUTPATIENT
Start: 2017-12-05 | End: 2017-12-05

## 2017-12-05 RX ORDER — ACETAMINOPHEN 500 MG
650 TABLET ORAL EVERY 6 HOURS
Qty: 0 | Refills: 0 | Status: DISCONTINUED | OUTPATIENT
Start: 2017-12-05 | End: 2017-12-07

## 2017-12-05 RX ORDER — PANTOPRAZOLE SODIUM 20 MG/1
40 TABLET, DELAYED RELEASE ORAL
Qty: 0 | Refills: 0 | Status: DISCONTINUED | OUTPATIENT
Start: 2017-12-05 | End: 2017-12-07

## 2017-12-05 RX ORDER — ASPIRIN/CALCIUM CARB/MAGNESIUM 324 MG
81 TABLET ORAL DAILY
Qty: 0 | Refills: 0 | Status: DISCONTINUED | OUTPATIENT
Start: 2017-12-05 | End: 2017-12-07

## 2017-12-05 RX ORDER — FAMOTIDINE 10 MG/ML
20 INJECTION INTRAVENOUS ONCE
Qty: 0 | Refills: 0 | Status: COMPLETED | OUTPATIENT
Start: 2017-12-05 | End: 2017-12-05

## 2017-12-05 RX ORDER — ASPIRIN/CALCIUM CARB/MAGNESIUM 324 MG
162 TABLET ORAL ONCE
Qty: 0 | Refills: 0 | Status: COMPLETED | OUTPATIENT
Start: 2017-12-05 | End: 2017-12-05

## 2017-12-05 RX ADMIN — SODIUM CHLORIDE 1000 MILLILITER(S): 9 INJECTION INTRAMUSCULAR; INTRAVENOUS; SUBCUTANEOUS at 09:07

## 2017-12-05 RX ADMIN — FAMOTIDINE 20 MILLIGRAM(S): 10 INJECTION INTRAVENOUS at 05:11

## 2017-12-05 RX ADMIN — Medication 81 MILLIGRAM(S): at 12:36

## 2017-12-05 RX ADMIN — PANTOPRAZOLE SODIUM 40 MILLIGRAM(S): 20 TABLET, DELAYED RELEASE ORAL at 10:53

## 2017-12-05 RX ADMIN — Medication 162 MILLIGRAM(S): at 05:11

## 2017-12-05 NOTE — ED ADULT NURSE NOTE - OBJECTIVE STATEMENT
pt received to room 7, AAOx3, c/o midsternal chest pain x2 weeks, worsening in the last 2 days. denies SOB. PMH- GERD. respirations even and unlabored, 18G IV Placed to L AC, labs drawn and sent, Sinus Sea on Cardiac monitor, VS as noted, pt in NAD, will continue to monitor pt.

## 2017-12-05 NOTE — H&P ADULT - NEGATIVE OPHTHALMOLOGIC SYMPTOMS
no photophobia/no diplopia/no blurred vision R/no pain L/no loss of vision R/no pain R/no loss of vision L/no blurred vision L

## 2017-12-05 NOTE — H&P ADULT - NEGATIVE GASTROINTESTINAL SYMPTOMS
no vomiting/no hematochezia/no change in bowel habits/no diarrhea/no flatulence/no constipation/no abdominal pain/no melena

## 2017-12-05 NOTE — H&P ADULT - ASSESSMENT
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

## 2017-12-05 NOTE — ED PROVIDER NOTE - FAMILY HISTORY
Mother  Still living? Unknown  Family history of CABG, Age at diagnosis: Age Unknown     Grandparent  Still living? Unknown  Family history of CABG, Age at diagnosis: Age Unknown

## 2017-12-05 NOTE — ED PROVIDER NOTE - MEDICAL DECISION MAKING DETAILS
53 yo M with pmhx of Gerd, presents with atypical chest pain, with T wave flattening in leads II V5, V6, inverted in III, concerning for ACS, or could be Gerd, PUD, Less likely dissection, BP on both arms equal.

## 2017-12-05 NOTE — ED ADULT TRIAGE NOTE - CHIEF COMPLAINT QUOTE
Pt c/o midsternal chest discomfort starting last Tuesday as well as sob. States discomfort is worse during inspiration. Denies medical hx.

## 2017-12-05 NOTE — H&P ADULT - RS GEN PE MLT RESP DETAILS PC
clear to auscultation bilaterally/no chest wall tenderness/airway patent/no rhonchi/no wheezes/no intercostal retractions/no rales/respirations non-labored/good air movement/breath sounds equal

## 2017-12-05 NOTE — CONSULT NOTE ADULT - ASSESSMENT
51 yo male h/o GERD a/w chest pain r/o ACS    cards following  trend CE  tele monitor  stress test normal  echo pending  ASA    GERD  ppi    TIMOTHY  ?baseline  iv hydration  monitor    rhabdo  iv fluid  trend CK    DVT ppx

## 2017-12-05 NOTE — CONSULT NOTE ADULT - SUBJECTIVE AND OBJECTIVE BOX
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 Over the past week, pt has been experiencing intermittent, 7/10, non-pleuritic, non-exertional, non-reproducible, substernal chest pain radiating to the upper back, associated with dyspnea on exertion, nausea and fatigue. Pt does not admit to any exertional component for his chest pain but reports dyspnea on exertion only (not at rest). Pt cannot identify any palliating or provoking factors for his chest pain but elicits that his symptoms have been more constant over the past 48 hours. Pt says he had a nuclear stress test about 2 years ago, which was reportedly normal. Pt denies fever, chills, recent travel, headache, dizziness, visual deficits, orthopnea, palpitations, abdominal pain, V/D/C, hematochezia, melena, dysuria, hematuria, LOC, syncope, peripheral edema. Upon arrival to ED, EKG: NSR at 60 bpm, TWI III, AVF, T wave flattening V5-V6. CE x1: Trop negative, . CXR: Clear lungs. BUN/Cr: 14/1.42. Pt is now admitted to telemetry. (05 Dec 2017 08:25)      Patient seen and evaluated at bedside. No acute events overnight except as noted.    Interval HPI: chest pain improved    PAST MEDICAL & SURGICAL HISTORY:  GERD (gastroesophageal reflux disease)  No significant past surgical history    SOC:  non smoker  no drug abuse  no alcohol abuse  works for dept of [employer]    Fam hx:  non cont    REVIEW OF SYSTEMS:  Constitutional: No fever, chills, fatigue or weight loss.  Skin: No rash.  Eyes: No recent vision problems or eye pain.  ENT: No congestion, ear pain, or sore throat.  Endocrine: No thyroid problems.  Cardiovascular: as per hpi  Respiratory: as per hpi  Gastrointestinal: No abdominal pain, nausea, vomiting, or diarrhea.  Genitourinary: No dysuria.  Musculoskeletal: No joint swelling.  Neurologic: No headache.    VITALS:  Vital Signs Last 24 Hrs  T(C): 36.7 (05 Dec 2017 17:08), Max: 36.8 (05 Dec 2017 00:55)  T(F): 98 (05 Dec 2017 17:08), Max: 98.3 (05 Dec 2017 03:58)  HR: 63 (05 Dec 2017 17:08) (50 - 70)  BP: 152/83 (05 Dec 2017 17:08) (142/90 - 160/86)  BP(mean): --  RR: 16 (05 Dec 2017 17:08) (14 - 16)  SpO2: 100% (05 Dec 2017 17:08) (97% - 100%)  CAPILLARY BLOOD GLUCOSE        I&O's Summary      PHYSICAL EXAM:  GENERAL: NAD, well-developed  HEAD:  Atraumatic, Normocephalic  EYES: EOMI, PERRLA, conjunctiva and sclera clear  NECK: Supple, No JVD  CHEST/LUNG: Clear to auscultation bilaterally; No wheeze  HEART: S1, S2; No murmurs, rubs, or gallops  ABDOMEN: Soft, Nontender, Nondistended; Bowel sounds present  EXTREMITIES:  2+ Peripheral Pulses, No clubbing, cyanosis, or edema  PSYCH: Normal affect  NEUROLOGY: AAOX3; non-focal  SKIN: No rashes or lesions    Consultant(s) Notes Reviewed:  [x ] YES  [ ] NO  Care Discussed with Consultants/Other Providers [ x] YES  [ ] NO    MEDS:  MEDICATIONS  (STANDING):  aspirin enteric coated 81 milliGRAM(s) Oral daily  pantoprazole    Tablet 40 milliGRAM(s) Oral before breakfast    MEDICATIONS  (PRN):    ALLERGIES:  No Known Allergies      LABS:                        15.0   3.76  )-----------( 182      ( 05 Dec 2017 04:30 )             43.4     12-05    140  |  103  |  14  ----------------------------<  96  3.9   |  26  |  1.42<H>    Ca    8.7      05 Dec 2017 04:30  Phos  3.6     12-05  Mg     1.9     12-05    TPro  6.9  /  Alb  4.0  /  TBili  0.2  /  DBili  x   /  AST  33  /  ALT  28  /  AlkPhos  36<L>  12-05      CARDIAC MARKERS ( 05 Dec 2017 10:30 )  x     / < 0.06 ng/mL / 598 u/L / 7.04 ng/mL / x      CARDIAC MARKERS ( 05 Dec 2017 04:30 )  x     / < 0.06 ng/mL / 664 u/L / 7.79 ng/mL / x          LIVER FUNCTIONS - ( 05 Dec 2017 04:30 )  Alb: 4.0 g/dL / Pro: 6.9 g/dL / ALK PHOS: 36 u/L / ALT: 28 u/L / AST: 33 u/L / GGT: x             < from: Nuclear Stress Test-Exercise (12.05.17 @ 12:50) >  ------------------  IMPRESSIONS:Normal Study  * Myocardial Perfusion SPECT results are normal.  * Review of raw data shows: The study is of good technical  quality.  * The left ventricle was normal in size. Normal myocardial  perfusion scan,with no evidence of infarction or inducible  ischemia.  * Post-stress gated wall motion analysis was performed  (LVEF = 51 %;LVEDV = 131 ml.), revealing normal LV  function.  ----------------------------------------------------------------    < end of copied text >

## 2017-12-05 NOTE — H&P ADULT - NEGATIVE NEUROLOGICAL SYMPTOMS
no loss of consciousness/no focal seizures/no tremors/no syncope/no generalized seizures/no transient paralysis/no weakness/no paresthesias/no loss of sensation/no headache/no confusion/no vertigo/no difficulty walking/no hemiparesis

## 2017-12-05 NOTE — H&P ADULT - NSHPSOCIALHISTORY_GEN_ALL_CORE
Single. Lives alone. Works for the [employer]. Denies smoking/drinking/illicit drug use.

## 2017-12-05 NOTE — H&P ADULT - PROBLEM SELECTOR PLAN 1
Admit to telemetry, serial cardiac enzymes, serial EKGs  Check CBC, CMP, TSH, FLP, HgA1C  Start ASA daily  Exercise NST ordered  F/U MD note

## 2017-12-05 NOTE — H&P ADULT - NSHPLABSRESULTS_GEN_ALL_CORE
EKG: NSR at 60 bpm, TWI III, AVF, T wave flattening V5-V6  CE x1: Trop negative,   CXR: Clear lungs  BUN/Cr: 14/1.42

## 2017-12-05 NOTE — H&P ADULT - NEUROLOGICAL DETAILS
alert and oriented x 3/sensation intact/responds to pain/responds to verbal commands/normal strength/cranial nerves intact

## 2017-12-05 NOTE — ED PROVIDER NOTE - OBJECTIVE STATEMENT
51 yo M with hx of Gerd, presents with chest pain that started two weeks ago.  The pain is described as a knot in the center of his chest, radiates to his back.  Patient had associated nausea and shortness of breath.  His chest pain worsened the last two days.  He gets short of breath easily with exertion now at work.  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  His chest pain is constant.  Waxes and Wanes in intensity.  Currently is 3-4/10.  He has family history of father and grandmother who had CABG.  Age unknown.  His brother also has heart problems, cannot specify.    Patient denies diaphoresis, lightheadedness, changes in vision.  No radiation down the arms or jaw.  Has not experienced pain like this in the past. 51 yo M with hx of Gerd, presents with chest pain that started two weeks ago.  The pain is described as a "knot" in the center of his chest, radiates to his back.  Patient had associated nausea and shortness of breath.  His chest pain worsened the last two days.  He gets short of breath easily with exertion now at work.  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  His chest pain is constant.  Waxes and Wanes in intensity.  Currently is 3-4/10.  He has family history of father and grandmother who had CABG.  Age unknown.  His brother also has heart problems, cannot specify.    Patient denies diaphoresis, lightheadedness, changes in vision.  No radiation down the arms or jaw.  Has not experienced pain like this in the past.

## 2017-12-05 NOTE — ED PROVIDER NOTE - ATTENDING CONTRIBUTION TO CARE
ED Attending Dr. Seth: 51 yo male with GERD in ED with 2 weeks of chest pain, initially intermittent, now constant.  Associated with SOB that is also worse.  Symptoms more exertional recently.  No radiation of pain.  Had cardiac workup over a year ago, no acute findings.  On exam pt well appearing, in NAD, heart RRR, lungs CTAB, abd NTND, extremities without swelling, strength 5/5 in all extremities and skin without rash.  EKG showing new lateral T wave flattening and new TWIs in inferior leads.  Has FHx of CAD but not before age 50 as far as he knows.

## 2017-12-05 NOTE — H&P ADULT - HISTORY OF PRESENT ILLNESS
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 Over the past week, pt has been experiencing intermittent, 7/10, non-pleuritic, non-exertional, non-reproducible, substernal chest pain radiating to the upper back, associated with dyspnea on exertion, nausea and fatigue. Pt does not admit to any exertional component for his chest pain but reports dyspnea on exertion only (not at rest). Pt cannot identify any palliating or provoking factors for his chest pain but elicits that his symptoms have been more constant over the past 48 hours. Pt says he had a nuclear stress test about 2 years ago, which was reportedly normal. Pt denies fever, chills, recent travel, headache, dizziness, visual deficits, orthopnea, palpitations, abdominal pain, V/D/C, hematochezia, melena, dysuria, hematuria, LOC, syncope, peripheral edema. Upon arrival to ED, EKG: NSR at 60 bpm, TWI III, AVF, T wave flattening V5-V6. CE x1: Trop negative, . CXR: Clear lungs. BUN/Cr: 14/1.42. Pt is now admitted to telemetry.

## 2017-12-05 NOTE — H&P ADULT - ATTENDING COMMENTS
Pt seen and examined at bedside. Agree with assessment and plan as above   admitted with chest pain and shortness of breath   Check TTE and SPECT   IV NS

## 2017-12-06 ENCOUNTER — TRANSCRIPTION ENCOUNTER (OUTPATIENT)
Age: 52
End: 2017-12-06

## 2017-12-06 LAB
BUN SERPL-MCNC: 14 MG/DL — SIGNIFICANT CHANGE UP (ref 7–23)
CALCIUM SERPL-MCNC: 8.8 MG/DL — SIGNIFICANT CHANGE UP (ref 8.4–10.5)
CHLORIDE SERPL-SCNC: 108 MMOL/L — HIGH (ref 98–107)
CHOLEST SERPL-MCNC: 176 MG/DL — SIGNIFICANT CHANGE UP (ref 120–199)
CO2 SERPL-SCNC: 25 MMOL/L — SIGNIFICANT CHANGE UP (ref 22–31)
CREAT SERPL-MCNC: 1.26 MG/DL — SIGNIFICANT CHANGE UP (ref 0.5–1.3)
GLUCOSE SERPL-MCNC: 104 MG/DL — HIGH (ref 70–99)
HBA1C BLD-MCNC: 6.2 % — HIGH (ref 4–5.6)
HCT VFR BLD CALC: 45 % — SIGNIFICANT CHANGE UP (ref 39–50)
HDLC SERPL-MCNC: 24 MG/DL — LOW (ref 35–55)
HGB BLD-MCNC: 15.5 G/DL — SIGNIFICANT CHANGE UP (ref 13–17)
LIPID PNL WITH DIRECT LDL SERPL: 131 MG/DL — SIGNIFICANT CHANGE UP
MAGNESIUM SERPL-MCNC: 1.9 MG/DL — SIGNIFICANT CHANGE UP (ref 1.6–2.6)
MCHC RBC-ENTMCNC: 29.4 PG — SIGNIFICANT CHANGE UP (ref 27–34)
MCHC RBC-ENTMCNC: 34.4 % — SIGNIFICANT CHANGE UP (ref 32–36)
MCV RBC AUTO: 85.4 FL — SIGNIFICANT CHANGE UP (ref 80–100)
NRBC # FLD: 0 — SIGNIFICANT CHANGE UP
PHOSPHATE SERPL-MCNC: 3.7 MG/DL — SIGNIFICANT CHANGE UP (ref 2.5–4.5)
PLATELET # BLD AUTO: 204 K/UL — SIGNIFICANT CHANGE UP (ref 150–400)
PMV BLD: 10.8 FL — SIGNIFICANT CHANGE UP (ref 7–13)
POTASSIUM SERPL-MCNC: 3.9 MMOL/L — SIGNIFICANT CHANGE UP (ref 3.5–5.3)
POTASSIUM SERPL-SCNC: 3.9 MMOL/L — SIGNIFICANT CHANGE UP (ref 3.5–5.3)
RBC # BLD: 5.27 M/UL — SIGNIFICANT CHANGE UP (ref 4.2–5.8)
RBC # FLD: 12.9 % — SIGNIFICANT CHANGE UP (ref 10.3–14.5)
SODIUM SERPL-SCNC: 144 MMOL/L — SIGNIFICANT CHANGE UP (ref 135–145)
TRIGL SERPL-MCNC: 182 MG/DL — HIGH (ref 10–149)
TSH SERPL-MCNC: 1.37 UIU/ML — SIGNIFICANT CHANGE UP (ref 0.27–4.2)
WBC # BLD: 3.42 K/UL — LOW (ref 3.8–10.5)
WBC # FLD AUTO: 3.42 K/UL — LOW (ref 3.8–10.5)

## 2017-12-06 PROCEDURE — 93306 TTE W/DOPPLER COMPLETE: CPT | Mod: 26

## 2017-12-06 RX ORDER — INDOMETHACIN 50 MG
25 CAPSULE ORAL
Qty: 0 | Refills: 0 | Status: DISCONTINUED | OUTPATIENT
Start: 2017-12-06 | End: 2017-12-07

## 2017-12-06 RX ORDER — SODIUM CHLORIDE 9 MG/ML
1000 INJECTION INTRAMUSCULAR; INTRAVENOUS; SUBCUTANEOUS
Qty: 0 | Refills: 0 | Status: DISCONTINUED | OUTPATIENT
Start: 2017-12-06 | End: 2017-12-07

## 2017-12-06 RX ADMIN — Medication 5 MILLIGRAM(S): at 20:39

## 2017-12-06 RX ADMIN — SODIUM CHLORIDE 150 MILLILITER(S): 9 INJECTION INTRAMUSCULAR; INTRAVENOUS; SUBCUTANEOUS at 12:49

## 2017-12-06 RX ADMIN — PANTOPRAZOLE SODIUM 40 MILLIGRAM(S): 20 TABLET, DELAYED RELEASE ORAL at 05:21

## 2017-12-06 RX ADMIN — Medication 25 MILLIGRAM(S): at 18:17

## 2017-12-06 RX ADMIN — Medication 30 MILLILITER(S): at 18:17

## 2017-12-06 RX ADMIN — Medication 81 MILLIGRAM(S): at 11:18

## 2017-12-06 NOTE — DISCHARGE NOTE ADULT - CARE PROVIDER_API CALL
Tor Cuellar (), Cardiology; Internal Medicine  [address] [place] [address]  [address]  [place] [address]  Phone: [phone]  Fax: [phone]

## 2017-12-06 NOTE — DISCHARGE NOTE ADULT - CARE PLAN
Principal Discharge DX:	Atypical chest pain  Goal:	prevent further episodes  Instructions for follow-up, activity and diet:	follow up with your PMD in one week - call for appointment  Secondary Diagnosis:	Rhabdomyolysis

## 2017-12-06 NOTE — PROGRESS NOTE ADULT - SUBJECTIVE AND OBJECTIVE BOX
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 Over the past week, pt has been experiencing intermittent, 7/10, non-pleuritic, non-exertional, non-reproducible, substernal chest pain radiating to the upper back, associated with dyspnea on exertion, nausea and fatigue. Pt does not admit to any exertional component for his chest pain but reports dyspnea on exertion only (not at rest). Pt cannot identify any palliating or provoking factors for his chest pain but elicits that his symptoms have been more constant over the past 48 hours. Pt says he had a nuclear stress test about 2 years ago, which was reportedly normal. Pt denies fever, chills, recent travel, headache, dizziness, visual deficits, orthopnea, palpitations, abdominal pain, V/D/C, hematochezia, melena, dysuria, hematuria, LOC, syncope, peripheral edema. Upon arrival to ED, EKG: NSR at 60 bpm, TWI III, AVF, T wave flattening V5-V6. CE x1: Trop negative, . CXR: Clear lungs. BUN/Cr: 14/1.42. Pt is now admitted to telemetry. (05 Dec 2017 08:25)      Patient seen and evaluated at bedside. No acute events overnight except as noted.    Interval HPI: chest pain improved    PAST MEDICAL & SURGICAL HISTORY:  GERD (gastroesophageal reflux disease)  No significant past surgical history    SOC:  non smoker  no drug abuse  no alcohol abuse  works for dept of [employer]    Fam hx:  non cont    REVIEW OF SYSTEMS:  Constitutional: No fever, chills, fatigue or weight loss.  Skin: No rash.  Eyes: No recent vision problems or eye pain.  ENT: No congestion, ear pain, or sore throat.  Endocrine: No thyroid problems.  Cardiovascular: as per hpi  Respiratory: as per hpi  Gastrointestinal: No abdominal pain, nausea, vomiting, or diarrhea.  Genitourinary: No dysuria.  Musculoskeletal: No joint swelling.  Neurologic: No headache.    VITALS:  Vital Signs Last 24 Hrs  T(C): 36.9 (06 Dec 2017 20:40), Max: 36.9 (06 Dec 2017 20:40)  T(F): 98.5 (06 Dec 2017 20:40), Max: 98.5 (06 Dec 2017 20:40)  HR: 60 (06 Dec 2017 20:40) (58 - 67)  BP: 139/81 (06 Dec 2017 20:40) (139/81 - 160/97)  BP(mean): --  RR: 17 (06 Dec 2017 20:40) (15 - 17)  SpO2: 100% (06 Dec 2017 20:40) (98% - 100%)      PHYSICAL EXAM:  GENERAL: NAD, well-developed  HEAD:  Atraumatic, Normocephalic  EYES: EOMI, PERRLA, conjunctiva and sclera clear  NECK: Supple, No JVD  CHEST/LUNG: Clear to auscultation bilaterally; No wheeze  HEART: S1, S2; No murmurs, rubs, or gallops  ABDOMEN: Soft, Nontender, Nondistended; Bowel sounds present  EXTREMITIES:  2+ Peripheral Pulses, No clubbing, cyanosis, or edema  PSYCH: Normal affect  NEUROLOGY: AAOX3; non-focal  SKIN: No rashes or lesions    Consultant(s) Notes Reviewed:  [x ] YES  [ ] NO  Care Discussed with Consultants/Other Providers [ x] YES  [ ] NO    MEDS:  MEDICATIONS  (STANDING):  aspirin enteric coated 81 milliGRAM(s) Oral daily  indomethacin 25 milliGRAM(s) Oral two times a day  pantoprazole    Tablet 40 milliGRAM(s) Oral before breakfast  sodium chloride 0.9%. 1000 milliLiter(s) (150 mL/Hr) IV Continuous <Continuous>    MEDICATIONS  (PRN):  acetaminophen   Tablet. 650 milliGRAM(s) Oral every 6 hours PRN mild to moderate to severe pain (1-10)  aluminum hydroxide/magnesium hydroxide/simethicone Suspension 30 milliLiter(s) Oral every 6 hours PRN Dyspepsia  bisacodyl 5 milliGRAM(s) Oral every 12 hours PRN Constipation    ALLERGIES:  No Known Allergies      LABS:                                     15.5   3.42  )-----------( 204      ( 06 Dec 2017 06:00 )             45.0   12-06    144  |  108<H>  |  14  ----------------------------<  104<H>  3.9   |  25  |  1.26    Ca    8.8      06 Dec 2017 06:00  Phos  3.7     12-06  Mg     1.9     12-06    TPro  6.9  /  Alb  4.0  /  TBili  0.2  /  DBili  x   /  AST  33  /  ALT  28  /  AlkPhos  36<L>  12-05  < from: Transthoracic Echocardiogram (12.06.17 @ 15:59) >  -------------------------  CONCLUSIONS:  1. Mitral annular calcification, otherwise normal mitral  valve.  2. Calcified trileaflet aortic valve with normal opening.  3. Normal left ventricular internal dimensions and wall  thicknesses.  4. Normal left ventricular systolic function. No segmental  wall motion abnormalities.  5. Normal right ventricular size and function.  --------------------------------------------------    < end of copied text >        < from: Nuclear Stress Test-Exercise (12.05.17 @ 12:50) >  ------------------  IMPRESSIONS:Normal Study  * Myocardial Perfusion SPECT results are normal.  * Review of raw data shows: The study is of good technical  quality.  * The left ventricle was normal in size. Normal myocardial  perfusion scan,with no evidence of infarction or inducible  ischemia.  * Post-stress gated wall motion analysis was performed  (LVEF = 51 %;LVEDV = 131 ml.), revealing normal LV  function.  ----------------------------------------------------------------    < end of copied text >

## 2017-12-06 NOTE — PROGRESS NOTE ADULT - SUBJECTIVE AND OBJECTIVE BOX
Subjective: Patient seen and examined. No new events except as noted.   +sob when he takes deep breath     REVIEW OF SYSTEMS:    CONSTITUTIONAL: No weakness, fevers or chills  EYES/ENT: No visual changes;  No vertigo or throat pain   NECK: No pain or stiffness  RESPIRATORY: No cough, wheezing, hemoptysis; No shortness of breath  CARDIOVASCULAR: No chest pain or palpitations  GASTROINTESTINAL: No abdominal or epigastric pain. No nausea, vomiting, or hematemesis; No diarrhea or constipation. No melena or hematochezia.  GENITOURINARY: No dysuria, frequency or hematuria  NEUROLOGICAL: No numbness or weakness  SKIN: No itching, burning, rashes, or lesions   All other review of systems is negative unless indicated above.    MEDICATIONS:  MEDICATIONS  (STANDING):  aspirin enteric coated 81 milliGRAM(s) Oral daily  pantoprazole    Tablet 40 milliGRAM(s) Oral before breakfast      PHYSICAL EXAM:  T(C): 36.7 (12-06-17 @ 05:19), Max: 36.7 (12-05-17 @ 17:08)  HR: 67 (12-06-17 @ 05:19) (55 - 67)  BP: 151/84 (12-06-17 @ 05:19) (126/59 - 152/83)  RR: 15 (12-06-17 @ 05:19) (15 - 16)  SpO2: 100% (12-06-17 @ 05:19) (100% - 100%)  Wt(kg): --  I&O's Summary    06 Dec 2017 07:01  -  06 Dec 2017 12:16  --------------------------------------------------------  IN: 120 mL / OUT: 0 mL / NET: 120 mL      Height (cm): 175.26 (12-05 @ 20:55)  Weight (kg): 105 (12-05 @ 20:55)  BMI (kg/m2): 34.2 (12-05 @ 20:55)  BSA (m2): 2.2 (12-05 @ 20:55)    Appearance: Normal	  HEENT:   Normal oral mucosa, PERRL, EOMI	  Lymphatic: No lymphadenopathy , no edema  Cardiovascular: Normal S1 S2, No JVD, No murmurs , Peripheral pulses palpable 2+ bilaterally  Respiratory: Lungs clear to auscultation, normal effort 	  Gastrointestinal:  Soft, Non-tender, + BS	  Skin: No rashes, No ecchymoses, No cyanosis, warm to touch  Musculoskeletal: Normal range of motion, normal strength  Psychiatry:  Mood & affect appropriate  Ext: No edema      LABS:    CARDIAC MARKERS:  CARDIAC MARKERS ( 05 Dec 2017 10:30 )  x     / < 0.06 ng/mL / 598 u/L / 7.04 ng/mL / x      CARDIAC MARKERS ( 05 Dec 2017 04:30 )  x     / < 0.06 ng/mL / 664 u/L / 7.79 ng/mL / x                                    15.5   3.42  )-----------( 204      ( 06 Dec 2017 06:00 )             45.0     12-06    144  |  108<H>  |  14  ----------------------------<  104<H>  3.9   |  25  |  1.26    Ca    8.8      06 Dec 2017 06:00  Phos  3.7     12-06  Mg     1.9     12-06    TPro  6.9  /  Alb  4.0  /  TBili  0.2  /  DBili  x   /  AST  33  /  ALT  28  /  AlkPhos  36<L>  12-05    proBNP:   Lipid Profile:   HgA1c: Hemoglobin A1C, Whole Blood: 6.2 % (12-06 @ 06:00)    TSH: Thyroid Stimulating Hormone, Serum: 1.37 uIU/mL (12-06 @ 06:00)              TELEMETRY: 	    ECG:  	  RADIOLOGY:   DIAGNOSTIC TESTING:  [ ] Echocardiogram:  [ ]  Catheterization:  [ ] Stress Test:  < from: Nuclear Stress Test-Exercise (12.05.17 @ 12:50) >  HISTORY:  CARDIAC HISTORY: 52 year old man with history of  [facility] the [facility]  OTHER HISTORY: GERD, Asthma  RISK FACTORS: Hypertension, Family History  MEDICATIONS: ASA, Protonix  ------------------------------------------------------------------------  BASELINE ELECTROCARDIOGRAM:  Rhythm: Normal Sinus Rhythm - 67 BPM  ST: No significant ST abnormalities.  ------------------------------------------------------------------------  EXERCISE RESULTS:  TIME      SPEED    GRADE  HR      BP  Baseline  Standing   N/A  70  136/73  03:00     1.7 MPH    10% 101  150/70  06:00     2.5 MPH    12% 120 190/100  09:00     3.4 MPH    14% 133 198/100  10:29     4.2 MPH    16% 146  02:00     Recovery        96  189/62  04:00     Recovery        86  143/74  05:18     Recovery  86  ------------------------------------------------------------------------  Protocol: Donny   Exercise Duration: 10: 29 Min  HR: Baseline HR: 70 bpm   Peak HR: 146 bpm (87% of MPHR)  MPHR: 168 bpm   85% of MPHR: 143 bpm  BP: Baseline BP: 136/73 mmHg   Peak BP: 198/100 mmHg  Peak RPP: 73369 (Rate Pressure Product)  Last Caffeine intake: 12 hrs  Terminated: Target HR Achieved  Performance: 11 METS, Good for age and gender.  Comments: The patient walked on treadmill, target heart  rate was achieved.  ------------------------------------------------------------------------  SYMPTOMS/FINDINGS:  Symptoms: Shortness of breath  Chest pain: Atypical chest discomfort at 7:30 of exercise  at HR of 127. Chest discomfort resolved at 9:37 at HR of  139.  Treatment: None  ------------------------------------------------------------------------  ECG ABNORMALITIES DURING/AFTER STRESS:   ST Changes:ST Depression: 1.5 mm upsloping in leads II ,  III, aVF started at 05:50 min of exercise at HR of 121 and  persisted 01:50 min into recovery.  ------------------------------------------------------------------------  NEW ARRHYTHMIAS DEVELOPED DURING/AFTER STRESS:  Single APD occurred during recovery.  ------------------------------------------------------------------------  STRESS TEST IMPRESSIONS:  Exercise capacity: 11 METS, Good for age and gender. 87%  of MPHR.  Chest Pain: Atypical chest discomfort at 7:30 of exercise  at HR of 127. Chest discomfort resolved at 9:37 at HR of  139. .  Symptom: Shortness of breath.  HR Response: Appropriate.  BP Response: Appropriate.  Heart Rhythm: Normal Sinus Rhythm - 67 BPM.  Conduction defects: incomplete right bundle brach block.  Baseline ECG: T wave abnormality in III.  ECG Changes: ST Depression: 1.5 mm upsloping in leads II ,  III, aVF started at 05:50 min of exercise at HR of 121 and  persisted 01:50 min into recovery.  Arrhythmia: Single APD occurred during recovery.  Brian Score: 10  ------------------------------------------------------------------------  PROCEDURE:  24.3 mCi of Tc99m Sestamibi were injected during stress  protocol. Approximately 45 minutes later, tomographic  images were obtained in a 180 degree arc from right  anterior oblique to left anterior oblique with 64 stops.  At a separate time on 00/00/0000Approximately 45 minute(s)  later, tomographic images were obtained in a 180 degree  arc from right anterior oblique to left anterior oblique  with 64 stops. The tomographic slices were reconstructed  in 3 orthogonal planes (short axis, horizontal long axis  and vertical long axis).  Interpretation was performed  both by visual and quantitative analysis.  Rest and stress images were acquired using CZT-based  system with pinhole collimation (Delectable 530 c, travelfox), and reconstructed using MLEM algorithm.  Images were re-acquired with the patient in a prone  position.  ------------------------------------------------------------------------  NUCLEAR FINDINGS:  Review of raw data shows: The study is of good technical  quality.  The left ventricle was normal in size. Normal myocardial  perfusion scan,with no evidence of infarction or inducible  ischemia.  ------------------------------------------------------------------------  GATED ANALYSIS:  Post-stress gated wall motion analysis was performed (LVEF  = 51 %;LVEDV = 131 ml.), revealing normal LV function.  ------------------------------------------------------------------------  IMPRESSIONS:Normal Study  * Myocardial Perfusion SPECT results are normal.  * Review of raw data shows: The study is of good technical  quality.  * The left ventricle was normal in size. Normal myocardial  perfusion scan,with no evidence of infarction or inducible  ischemia.  * Post-stress gated wall motion analysis was performed  (LVEF = 51 %;LVEDV = 131 ml.), revealing normal LV  function.        OTHER:

## 2017-12-06 NOTE — PROGRESS NOTE ADULT - ASSESSMENT
51 yo male h/o GERD a/w chest pain r/o ACS    cards following  no evid of ACS  elevated ck likely from excessive exercise  tele monitor  stress test normal  echo nl  ASA    GERD  ppi    TIMOTHY  ?baseline  iv hydration  monitor    rhabdo  iv fluid  trend CK    DVT ppx    dc planning

## 2017-12-06 NOTE — DISCHARGE NOTE ADULT - MEDICATION SUMMARY - MEDICATIONS TO TAKE
I will START or STAY ON the medications listed below when I get home from the hospital:    acetaminophen 325 mg oral tablet  -- 2 tab(s) by mouth every 6 hours, As needed, mild to moderate to severe pain (1-10)  -- Indication: For pain    indomethacin 25 mg oral capsule  -- 1 cap(s) by mouth 2 times a day  -- Indication: For Rhabdomyolysis    aspirin 81 mg oral delayed release tablet  -- 1 tab(s) by mouth once a day  -- Indication: For Rhabdomyolysis    Dexilant 60 mg oral delayed release capsule  -- 1 cap(s) by mouth once a day  -- Indication: For GERD (gastroesophageal reflux disease)

## 2017-12-06 NOTE — DISCHARGE NOTE ADULT - PATIENT PORTAL LINK FT
“You can access the FollowHealth Patient Portal, offered by [facility], by registering with the following website: [email or web address]”
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
## 2017-12-06 NOTE — DISCHARGE NOTE ADULT - HOSPITAL COURSE
51 y/o active male (works for [employer]) with a PMHx of GERD presents to ED with substernal chest pain and shortness of breath, R/O ACS.     + Chest pain- S/P normal NST, pending echo  + TIMOTHY/rhabdomyolysis- ordered for 1/2L NS  EKG: NSR at 60 bpm, TWI III, AVF, T wave flattening V5-V6  CE x2: Trop negative, -->598  CXR: Clear lungs  BUN/Cr: 14/1.42    12/5 Exercise NST: Normal study. Myocardial Perfusion SPECT results are normal. Review of raw data shows: The study is of good technical quality. The left ventricle was normal in size. Normal myocardial perfusion scan, with no evidence of infarction or inducible ischemia. Post-stress gated wall motion analysis was performed (LVEF = 51%; LVEDV = 131 ml.), revealing normal LV function.    12/6 Echo:    Discussed with MD - pt stable for discharge home, pt with no complaints. 51 y/o active male (works for [employer]) with a PMHx of GERD presents to ED with substernal chest pain and shortness of breath, R/O ACS.     + Chest pain- S/P normal NST, pending echo  + TIMOTHY/rhabdomyolysis- ordered for 1/2L NS  EKG: NSR at 60 bpm, TWI III, AVF, T wave flattening V5-V6  CE x2: Trop negative, -->598  CXR: Clear lungs  BUN/Cr: 14/1.42    12/5 Exercise NST: Normal study. Myocardial Perfusion SPECT results are normal. Review of raw data shows: The study is of good technical quality. The left ventricle was normal in size. Normal myocardial perfusion scan, with no evidence of infarction or inducible ischemia. Post-stress gated wall motion analysis was performed (LVEF = 51%; LVEDV = 131 ml.), revealing normal LV function.    12/6 Echo: Mitral annular calcification, otherwise normal mitral valve. Calcified trileaflet aortic valve with normal opening. Normal left ventricular internal dimensions and wall thicknesses. Normal left ventricular systolic function. No segmental wall motion abnormalities.  Normal right ventricular size and function.      Discussed with MD - pt stable for discharge home, pt with no complaints.  discharge meds reviewed with MD

## 2017-12-06 NOTE — DISCHARGE NOTE ADULT - MEDICATION SUMMARY - MEDICATIONS TO STOP TAKING
I will STOP taking the medications listed below when I get home from the hospital:    amoxicillin-clavulanate 875 mg-125 mg oral tablet  -- 875 milligram(s) by mouth every 12 hours  -- Finish all this medication unless otherwise directed by prescriber.  Take with food or milk.

## 2017-12-06 NOTE — PROGRESS NOTE ADULT - ASSESSMENT
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

## 2017-12-07 VITALS
OXYGEN SATURATION: 100 % | TEMPERATURE: 98 F | SYSTOLIC BLOOD PRESSURE: 147 MMHG | RESPIRATION RATE: 17 BRPM | HEART RATE: 62 BPM | DIASTOLIC BLOOD PRESSURE: 81 MMHG

## 2017-12-07 LAB
BASOPHILS # BLD AUTO: 0.04 K/UL — SIGNIFICANT CHANGE UP (ref 0–0.2)
BASOPHILS NFR BLD AUTO: 1 % — SIGNIFICANT CHANGE UP (ref 0–2)
BUN SERPL-MCNC: 11 MG/DL — SIGNIFICANT CHANGE UP (ref 7–23)
CALCIUM SERPL-MCNC: 8.2 MG/DL — LOW (ref 8.4–10.5)
CHLORIDE SERPL-SCNC: 104 MMOL/L — SIGNIFICANT CHANGE UP (ref 98–107)
CK SERPL-CCNC: 338 U/L — HIGH (ref 30–200)
CO2 SERPL-SCNC: 21 MMOL/L — LOW (ref 22–31)
CREAT SERPL-MCNC: 1.2 MG/DL — SIGNIFICANT CHANGE UP (ref 0.5–1.3)
EOSINOPHIL # BLD AUTO: 0.08 K/UL — SIGNIFICANT CHANGE UP (ref 0–0.5)
EOSINOPHIL NFR BLD AUTO: 2.1 % — SIGNIFICANT CHANGE UP (ref 0–6)
GLUCOSE SERPL-MCNC: 131 MG/DL — HIGH (ref 70–99)
HCT VFR BLD CALC: 43.8 % — SIGNIFICANT CHANGE UP (ref 39–50)
HGB BLD-MCNC: 15.1 G/DL — SIGNIFICANT CHANGE UP (ref 13–17)
IMM GRANULOCYTES # BLD AUTO: 0.02 # — SIGNIFICANT CHANGE UP
IMM GRANULOCYTES NFR BLD AUTO: 0.5 % — SIGNIFICANT CHANGE UP (ref 0–1.5)
LYMPHOCYTES # BLD AUTO: 1.79 K/UL — SIGNIFICANT CHANGE UP (ref 1–3.3)
LYMPHOCYTES # BLD AUTO: 46.4 % — HIGH (ref 13–44)
MAGNESIUM SERPL-MCNC: 1.9 MG/DL — SIGNIFICANT CHANGE UP (ref 1.6–2.6)
MCHC RBC-ENTMCNC: 29.5 PG — SIGNIFICANT CHANGE UP (ref 27–34)
MCHC RBC-ENTMCNC: 34.5 % — SIGNIFICANT CHANGE UP (ref 32–36)
MCV RBC AUTO: 85.7 FL — SIGNIFICANT CHANGE UP (ref 80–100)
MONOCYTES # BLD AUTO: 0.48 K/UL — SIGNIFICANT CHANGE UP (ref 0–0.9)
MONOCYTES NFR BLD AUTO: 12.4 % — SIGNIFICANT CHANGE UP (ref 2–14)
NEUTROPHILS # BLD AUTO: 1.45 K/UL — LOW (ref 1.8–7.4)
NEUTROPHILS NFR BLD AUTO: 37.6 % — LOW (ref 43–77)
NRBC # FLD: 0 — SIGNIFICANT CHANGE UP
PLATELET # BLD AUTO: 194 K/UL — SIGNIFICANT CHANGE UP (ref 150–400)
PMV BLD: 10.9 FL — SIGNIFICANT CHANGE UP (ref 7–13)
POTASSIUM SERPL-MCNC: 4.1 MMOL/L — SIGNIFICANT CHANGE UP (ref 3.5–5.3)
POTASSIUM SERPL-SCNC: 4.1 MMOL/L — SIGNIFICANT CHANGE UP (ref 3.5–5.3)
RBC # BLD: 5.11 M/UL — SIGNIFICANT CHANGE UP (ref 4.2–5.8)
RBC # FLD: 12.9 % — SIGNIFICANT CHANGE UP (ref 10.3–14.5)
SODIUM SERPL-SCNC: 138 MMOL/L — SIGNIFICANT CHANGE UP (ref 135–145)
WBC # BLD: 3.86 K/UL — SIGNIFICANT CHANGE UP (ref 3.8–10.5)
WBC # FLD AUTO: 3.86 K/UL — SIGNIFICANT CHANGE UP (ref 3.8–10.5)

## 2017-12-07 RX ORDER — ASPIRIN/CALCIUM CARB/MAGNESIUM 324 MG
1 TABLET ORAL
Qty: 30 | Refills: 0 | OUTPATIENT
Start: 2017-12-07 | End: 2018-01-06

## 2017-12-07 RX ORDER — INDOMETHACIN 50 MG
1 CAPSULE ORAL
Qty: 12 | Refills: 0 | OUTPATIENT
Start: 2017-12-07 | End: 2017-12-13

## 2017-12-07 RX ORDER — ACETAMINOPHEN 500 MG
2 TABLET ORAL
Qty: 0 | Refills: 0 | COMMUNITY
Start: 2017-12-07

## 2017-12-07 RX ADMIN — Medication 25 MILLIGRAM(S): at 05:36

## 2017-12-07 RX ADMIN — PANTOPRAZOLE SODIUM 40 MILLIGRAM(S): 20 TABLET, DELAYED RELEASE ORAL at 05:36

## 2017-12-07 NOTE — PROGRESS NOTE ADULT - ASSESSMENT
53 yo male h/o GERD a/w chest pain r/o ACS    cards following  no evid of ACS  elevated ck likely from excessive exercise  tele monitor  stress test normal  echo nl  ASA    GERD  ppi    TIMOTHY  resolved    rhabdo  iv fluid  trend CK    DVT ppx    dc planning

## 2017-12-07 NOTE — PROGRESS NOTE ADULT - SUBJECTIVE AND OBJECTIVE BOX
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 Over the past week, pt has been experiencing intermittent, 7/10, non-pleuritic, non-exertional, non-reproducible, substernal chest pain radiating to the upper back, associated with dyspnea on exertion, nausea and fatigue. Pt does not admit to any exertional component for his chest pain but reports dyspnea on exertion only (not at rest). Pt cannot identify any palliating or provoking factors for his chest pain but elicits that his symptoms have been more constant over the past 48 hours. Pt says he had a nuclear stress test about 2 years ago, which was reportedly normal. Pt denies fever, chills, recent travel, headache, dizziness, visual deficits, orthopnea, palpitations, abdominal pain, V/D/C, hematochezia, melena, dysuria, hematuria, LOC, syncope, peripheral edema. Upon arrival to ED, EKG: NSR at 60 bpm, TWI III, AVF, T wave flattening V5-V6. CE x1: Trop negative, . CXR: Clear lungs. BUN/Cr: 14/1.42. Pt is now admitted to telemetry. (05 Dec 2017 08:25)      Patient seen and evaluated at bedside. No acute events overnight except as noted.    Interval HPI: feels well. no c/o    PAST MEDICAL & SURGICAL HISTORY:  GERD (gastroesophageal reflux disease)  No significant past surgical history    SOC:  non smoker  no drug abuse  no alcohol abuse  works for dept of [employer]    Fam hx:  non cont    REVIEW OF SYSTEMS:  Constitutional: No fever, chills, fatigue or weight loss.  Skin: No rash.  Eyes: No recent vision problems or eye pain.  ENT: No congestion, ear pain, or sore throat.  Endocrine: No thyroid problems.  Cardiovascular: as per hpi  Respiratory: as per hpi  Gastrointestinal: No abdominal pain, nausea, vomiting, or diarrhea.  Genitourinary: No dysuria.  Musculoskeletal: No joint swelling.  Neurologic: No headache.    VITALS:  Vital Signs Last 24 Hrs  T(C): 36.7 (07 Dec 2017 05:32), Max: 36.9 (06 Dec 2017 20:40)  T(F): 98.1 (07 Dec 2017 05:32), Max: 98.5 (06 Dec 2017 20:40)  HR: 62 (07 Dec 2017 05:32) (60 - 62)  BP: 147/81 (07 Dec 2017 05:32) (139/81 - 147/81)  BP(mean): --  RR: 17 (07 Dec 2017 05:32) (17 - 17)  SpO2: 100% (07 Dec 2017 05:32) (100% - 100%)      PHYSICAL EXAM:  GENERAL: NAD, well-developed  HEAD:  Atraumatic, Normocephalic  EYES: EOMI, PERRLA, conjunctiva and sclera clear  NECK: Supple, No JVD  CHEST/LUNG: Clear to auscultation bilaterally; No wheeze  HEART: S1, S2; No murmurs, rubs, or gallops  ABDOMEN: Soft, Nontender, Nondistended; Bowel sounds present  EXTREMITIES:  2+ Peripheral Pulses, No clubbing, cyanosis, or edema  PSYCH: Normal affect  NEUROLOGY: AAOX3; non-focal  SKIN: No rashes or lesions    Consultant(s) Notes Reviewed:  [x ] YES  [ ] NO  Care Discussed with Consultants/Other Providers [ x] YES  [ ] NO    MEDS:  MEDICATIONS  (STANDING):  aspirin enteric coated 81 milliGRAM(s) Oral daily  indomethacin 25 milliGRAM(s) Oral two times a day  pantoprazole    Tablet 40 milliGRAM(s) Oral before breakfast  sodium chloride 0.9%. 1000 milliLiter(s) (150 mL/Hr) IV Continuous <Continuous>    MEDICATIONS  (PRN):  acetaminophen   Tablet. 650 milliGRAM(s) Oral every 6 hours PRN mild to moderate to severe pain (1-10)  aluminum hydroxide/magnesium hydroxide/simethicone Suspension 30 milliLiter(s) Oral every 6 hours PRN Dyspepsia  bisacodyl 5 milliGRAM(s) Oral every 12 hours PRN Constipation      ALLERGIES:  No Known Allergies      LABS:                                   15.1   3.86  )-----------( 194      ( 07 Dec 2017 04:00 )             43.8   12-07    138  |  104  |  11  ----------------------------<  131<H>  4.1   |  21<L>  |  1.20    Ca    8.2<L>      07 Dec 2017 04:00  Phos  3.7     12-06  Mg     1.9     12-07      < from: Transthoracic Echocardiogram (12.06.17 @ 15:59) >  -------------------------  CONCLUSIONS:  1. Mitral annular calcification, otherwise normal mitral  valve.  2. Calcified trileaflet aortic valve with normal opening.  3. Normal left ventricular internal dimensions and wall  thicknesses.  4. Normal left ventricular systolic function. No segmental  wall motion abnormalities.  5. Normal right ventricular size and function.  --------------------------------------------------    < end of copied text >        < from: Nuclear Stress Test-Exercise (12.05.17 @ 12:50) >  ------------------  IMPRESSIONS:Normal Study  * Myocardial Perfusion SPECT results are normal.  * Review of raw data shows: The study is of good technical  quality.  * The left ventricle was normal in size. Normal myocardial  perfusion scan,with no evidence of infarction or inducible  ischemia.  * Post-stress gated wall motion analysis was performed  (LVEF = 51 %;LVEDV = 131 ml.), revealing normal LV  function.  ----------------------------------------------------------------    < end of copied text >

## 2018-01-24 NOTE — ED ADULT NURSE NOTE - RESPIRATORY RATE (BREATHS/MIN)
<Tavon Arnett - Last Filed: 01/24/18 06:05>





History of Present Illness





- History of Present Illness


History of Present Illness: 





Chief Complaint: "I had a relapse"





HPI: Patient is a 61 year old female with a past medical history significant 

for alcohol abuse who [place] [facility] [place] with complaints of alcohol withdrawal 

due to having a relapse. To note, patient continues to change her story each 

time she tells it. Patient states she normally drinks a pint of vodka however 

two weeks ago attempted to give up drinking. She said once she stopped she 

began vomiting, experienced a loss of appetite and had tremors. She decided to 

start drinking again over the weekend, however due to lack of money wasn't able 

to afford much alcohol. As a result she only was able to drink a few shots of 

vodka daily through the weekend. Admits to cough, nausea, vomiting, tremors, 

night sweats. Patient also admits to loss of sensation in left arm which has 

been going on for a few weeks. States she feels depressed because she can't 

find her insurance card which is preventing her from seeing a PMD, also states 

she can't afford to pay for psychiatry appointments and is jobless. Denies 

shortness of breath, chest pain, fevers, chills. 





Past medical history:alcohol abuse, endometriosis  


Past surgical history:laparoscopic surgery for endometriosis x 3


Medications:Valium and Ritalin


Allergies: NKDA 


Social history :admits to drinking 1 pint of vodka daily normally however in 

the past two weeks has been unable to obtain as much vodka lately and as a 

result had been consuming, denies tobacco or illicit drug use


Family History: Father, Brother, Sister: alcohol abuse





Present on Admission





- Present on Admission


Any Indicators Present on Admission: No





Review of Systems





- Constitutional


Constitutional: absent: Chills, Fever





- EENT


Eyes: absent: Blurred Vision, Change in Vision


Nose/Mouth/Throat: absent: Nasal Congestion, Nasal Discharge





- Cardiovascular


Cardiovascular: absent: Chest Pain, Chest Pain at Rest, Dyspnea





- Respiratory


Respiratory: Cough.  absent: Dyspnea





- Gastrointestinal


Gastrointestinal: Nausea, Vomiting.  absent: Abdominal Pain, Diarrhea





- Genitourinary


Genitourinary: absent: Difficulty Urinating, Dysuria





- Neurological


Neurological: Numbness (left arm).  absent: Dizziness, Headaches





- Psychiatric


Psychiatric: absent: Anxiety, Irritability





- Endocrine


Endocrine: Excessive Sweating.  absent: Fatigue





- Hematologic/Lymphatic


Hematologic: absent: Easy Bleeding, Easy Bruising





Past Patient History





- Infectious Disease


Hx of Infectious Diseases: None





- Tetanus Immunizations


Tetanus Immunization: Unknown





- Past Social History


Smoking Status: Never Smoked





- CARDIAC


Hx Cardiac Disorders: No





- PULMONARY


Hx Respiratory Disorders: No





- NEUROLOGICAL


Hx Neurological Disorder: No





- HEENT


Hx HEENT Problems: No





- RENAL


Hx Chronic Kidney Disease: No





- ENDOCRINE/METABOLIC


Hx Endocrine Disorders: No





- HEMATOLOGICAL/ONCOLOGICAL


Hx Blood Disorders: No





- INTEGUMENTARY


Hx Dermatological Problems: No





- MUSCULOSKELETAL/RHEUMATOLOGICAL


Hx Falls: Yes


Other/Comment: endometriosis





- GASTROINTESTINAL


Hx Gastrointestinal Disorders: No





- GENITOURINARY/GYNECOLOGICAL


Hx Genitourinary Disorders: No





- PSYCHIATRIC


Hx Depression: Yes


Hx Substance Use: No





- SURGICAL HISTORY


Other/Comment: endo,etriosis x2





- ANESTHESIA


Hx Anesthesia:  (uto)





Meds


Allergies/Adverse Reactions: 


 Allergies











Allergy/AdvReac Type Severity Reaction Status Date / Time


 


No Known Allergies Allergy   Verified 01/24/18 00:35














Physical Exam





- Constitutional


Appears: Toxic





- Head Exam


Head Exam: ATRAUMATIC, NORMAL INSPECTION, NORMOCEPHALIC





- Eye Exam


Eye Exam: EOMI, Normal appearance





- ENT Exam


ENT Exam: Mucous Membranes Moist, Normal Oropharynx





- Neck Exam


Neck exam: Positive for: Normal Inspection.  Negative for: Lymphadenopathy, 

Thyromegaly


Additional comments: 





no carotid bruit b/l





- Respiratory Exam


Respiratory Exam: Clear to Auscultation Bilateral, NORMAL BREATHING PATTERN.  

absent: Rhonchi, Wheezes





- Cardiovascular Exam


Cardiovascular Exam: REGULAR RHYTHM, +S1, +S2





- GI/Abdominal Exam


GI & Abdominal Exam: Normal Bowel Sounds, Soft





- Back Exam


Back exam: NORMAL INSPECTION





- Neurological Exam


Neurological exam: Alert, CN II-XII Intact, Oriented x3


Additional comments: 





sensation to sharp stimulus is intact in left arm





- Psychiatric Exam


Psychiatric exam: Depressed





- Skin


Skin Exam: Intact, Normal Color, Warm





Results





- Vital Signs


Recent Vital Signs: 





 Last Vital Signs











Temp  98.7 F   01/24/18 04:00


 


Pulse  95 H  01/24/18 04:00


 


Resp  18   01/24/18 04:00


 


BP  137/91 H  01/24/18 04:00


 


Pulse Ox  99   01/24/18 04:00














- Labs


Result Diagrams: 


 01/24/18 04:54





 01/24/18 04:54





Assessment & Plan





- Assessment and Plan (Free Text)


Assessment: 





Patient is a 61 year old female with a past medical history significant for 

alcohol abuse who [place] [facility] [place] with complaints of alcohol withdrawal due 

to having a relapse.


Plan: 


Alcohol Withdrawal


-CIWA


-Ativan 2 mg q6h PREETI


-Ativan 1 mg q1h PRN


-Consider librium or geodon if above does not suffice


-Aspiration,Seizure, High risk fall precautions


-IVF@100


-Psychiatry consult


-Multivitamin,Thiamine,Folic acid





Complaint of left arm numbness


-Troponins ordered x3


-EKG reviewed; NSR





Depression


-Psychiatry consulted








DVT/GI prophylaxis: Heparin/Protonix

















<Nelly Rodas - Last Filed: 01/24/18 06:23>





Results





- Vital Signs


Recent Vital Signs: 





 Last Vital Signs











Temp  98.7 F   01/24/18 04:00


 


Pulse  95 H  01/24/18 04:00


 


Resp  18   01/24/18 04:00


 


BP  137/91 H  01/24/18 04:00


 


Pulse Ox  99   01/24/18 04:00














- Labs


Result Diagrams: 


 01/24/18 04:54





 01/24/18 04:54


Labs: 





 Laboratory Results - last 24 hr











  01/24/18 01/24/18 01/24/18





  04:54 04:54 04:54


 


WBC   7.5 


 


RBC   3.75 


 


Hgb   13.1 


 


Hct   37.5 


 


MCV   100.0 


 


MCH   34.9 


 


MCHC   34.9 


 


RDW   21.4 H 


 


Plt Count   181 


 


MPV   9.1 


 


Gran %   60.7 


 


Lymph % (Auto)   28.8 


 


Mono % (Auto)   8.4 H 


 


Eos % (Auto)   1.3 L 


 


Baso % (Auto)   0.8 


 


Gran #   4.58 


 


Lymph #   2.2 


 


Mono #   0.6 


 


Eos #   0.1 


 


Baso #   0.06 


 


PT  11.7  


 


INR  1.03  


 


APTT  24.6 L  


 


Sodium    136


 


Potassium    5.1 H


 


Chloride    101


 


Carbon Dioxide    22


 


Anion Gap    18


 


BUN    14


 


Creatinine    0.5 L


 


Est GFR ( Amer)    > 60


 


Est GFR (Non-Af Amer)    > 60


 


Random Glucose    100


 


Calcium    10.3


 


Total Bilirubin    1.5 H


 


AST    97 H


 


ALT    28


 


Alkaline Phosphatase    90


 


Troponin I    < 0.01


 


Total Protein    9.0 H


 


Albumin    4.9 H


 


Globulin    4.1


 


Albumin/Globulin Ratio    1.2


 


TSH 3rd Generation   














  01/24/18





  04:54


 


WBC 


 


RBC 


 


Hgb 


 


Hct 


 


MCV 


 


MCH 


 


MCHC 


 


RDW 


 


Plt Count 


 


MPV 


 


Gran % 


 


Lymph % (Auto) 


 


Mono % (Auto) 


 


Eos % (Auto) 


 


Baso % (Auto) 


 


Gran # 


 


Lymph # 


 


Mono # 


 


Eos # 


 


Baso # 


 


PT 


 


INR 


 


APTT 


 


Sodium 


 


Potassium 


 


Chloride 


 


Carbon Dioxide 


 


Anion Gap 


 


BUN 


 


Creatinine 


 


Est GFR ( Amer) 


 


Est GFR (Non-Af Amer) 


 


Random Glucose 


 


Calcium 


 


Total Bilirubin 


 


AST 


 


ALT 


 


Alkaline Phosphatase 


 


Troponin I 


 


Total Protein 


 


Albumin 


 


Globulin 


 


Albumin/Globulin Ratio 


 


TSH 3rd Generation  1.81














Attending/Attestation





- Attestation


I have personally seen and examined this patient.: Yes


I have fully participated in the care of the patient.: Yes


I have reviewed all pertinent clinical information: Yes


Notes (Text): 





01/24/18 06:22


Patient seen with the medical resident.


Agree with documentation and orders placed.
16

## 2018-02-19 NOTE — PATIENT PROFILE ADULT. - SPIRITUAL CULTURAL, RELIGIOUS PRACTICES/VALUES, PROFILE
----- Message from Claudia Montenegro RN sent at 3/5/2015 12:03 PM CST -----  Regarding: colon  3 year f/u colon if healthy  Hx polyps   none

## 2018-03-05 ENCOUNTER — EMERGENCY (EMERGENCY)
Facility: HOSPITAL | Age: 53
LOS: 1 days | Discharge: HOME | End: 2018-03-05
Attending: EMERGENCY MEDICINE | Admitting: EMERGENCY MEDICINE
Payer: COMMERCIAL

## 2018-03-05 VITALS
RESPIRATION RATE: 18 BRPM | TEMPERATURE: 98 F | HEART RATE: 63 BPM | OXYGEN SATURATION: 99 % | SYSTOLIC BLOOD PRESSURE: 146 MMHG

## 2018-03-05 VITALS — RESPIRATION RATE: 16 BRPM | HEART RATE: 70 BPM | DIASTOLIC BLOOD PRESSURE: 82 MMHG | SYSTOLIC BLOOD PRESSURE: 130 MMHG

## 2018-03-05 PROCEDURE — 73590 X-RAY EXAM OF LOWER LEG: CPT | Mod: 26,RT

## 2018-03-05 PROCEDURE — 73590 X-RAY EXAM OF LOWER LEG: CPT

## 2018-03-05 PROCEDURE — 99284 EMERGENCY DEPT VISIT MOD MDM: CPT | Mod: 25

## 2018-03-05 PROCEDURE — 99283 EMERGENCY DEPT VISIT LOW MDM: CPT

## 2018-03-05 PROCEDURE — 73610 X-RAY EXAM OF ANKLE: CPT

## 2018-03-05 PROCEDURE — 73630 X-RAY EXAM OF FOOT: CPT | Mod: 26,RT

## 2018-03-05 PROCEDURE — 73564 X-RAY EXAM KNEE 4 OR MORE: CPT

## 2018-03-05 PROCEDURE — 73552 X-RAY EXAM OF FEMUR 2/>: CPT

## 2018-03-05 PROCEDURE — 73564 X-RAY EXAM KNEE 4 OR MORE: CPT | Mod: 26,RT

## 2018-03-05 PROCEDURE — 73610 X-RAY EXAM OF ANKLE: CPT | Mod: 26,RT

## 2018-03-05 PROCEDURE — 73630 X-RAY EXAM OF FOOT: CPT

## 2018-03-05 PROCEDURE — 73552 X-RAY EXAM OF FEMUR 2/>: CPT | Mod: 26,RT

## 2018-03-05 RX ORDER — IBUPROFEN 200 MG
600 TABLET ORAL ONCE
Qty: 0 | Refills: 0 | Status: COMPLETED | OUTPATIENT
Start: 2018-03-05 | End: 2018-03-05

## 2018-03-05 RX ADMIN — Medication 600 MILLIGRAM(S): at 09:18

## 2018-03-05 NOTE — ED PROVIDER NOTE - NEUROLOGICAL, MLM
Alert and oriented, no focal deficits, no motor or sensory deficits. Intact distal and motor sensation in right hip/thigh/leg/foot

## 2018-03-05 NOTE — ED PROVIDER NOTE - LOWER EXTREMITY EXAM, RIGHT
tenderness of superior patella, tenderness over quads tendon, minimal swelling on superior aspect of knee. No effusions, no deformity. FROM in knee. Strength 5/5. Normal anterior/posterior draw.
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
## 2018-03-05 NOTE — ED PROVIDER NOTE - PLAN OF CARE
Partial 1) Take ibuprofen 200mg tablet, take 3 tablets every 6-8 hours as needed for pain   2) Wear brace as directed, use crutches as demonstrated, weight bearing as tolerated  3) Follow up with orthopedics in 1-2 days, call [phone] for an appointment   4) Also, Please follow up with your primary medical doctor in 1-2 days for reevaluation. If you do not have pmd please call the general medicine clinic for an appointment at [phone].   5) Return to the ED for worsening pain, swelling, unable to walk, fever greater than 100.4, nausea, vomiting, dizziness, lightheadedness, leg turns pale or blue, numbness and tingling, or if you have any other new, worsening, or concerning symptoms.

## 2018-03-05 NOTE — ED PROVIDER NOTE - CARE PLAN
Principal Discharge DX:	Quadriceps tendon rupture, right, initial encounter  Goal:	Partial  Assessment and plan of treatment:	1) Take ibuprofen 200mg tablet, take 3 tablets every 6-8 hours as needed for pain   2) Wear brace as directed, use crutches as demonstrated, weight bearing as tolerated  3) Follow up with orthopedics in 1-2 days, call [phone] for an appointment   4) Also, Please follow up with your primary medical doctor in 1-2 days for reevaluation. If you do not have pmd please call the general medicine clinic for an appointment at [phone].   5) Return to the ED for worsening pain, swelling, unable to walk, fever greater than 100.4, nausea, vomiting, dizziness, lightheadedness, leg turns pale or blue, numbness and tingling, or if you have any other new, worsening, or concerning symptoms.

## 2018-03-05 NOTE — ED PROVIDER NOTE - PROGRESS NOTE DETAILS
likely partial quads tendon rupture based on x-ray, patient placed in knee brace, advised to use crutches, weight bearing as tolerating, motrin for pain, strict return precautions given, ortho clinic sheet given to patient. Patient able to ambulate with crutches on reevaluation.

## 2018-04-07 ENCOUNTER — EMERGENCY (EMERGENCY)
Facility: HOSPITAL | Age: 53
LOS: 1 days | Discharge: HOME | End: 2018-04-07
Attending: EMERGENCY MEDICINE | Admitting: EMERGENCY MEDICINE
Payer: COMMERCIAL

## 2018-04-07 VITALS
RESPIRATION RATE: 16 BRPM | OXYGEN SATURATION: 99 % | SYSTOLIC BLOOD PRESSURE: 171 MMHG | TEMPERATURE: 98 F | HEART RATE: 69 BPM | DIASTOLIC BLOOD PRESSURE: 94 MMHG

## 2018-04-07 VITALS
SYSTOLIC BLOOD PRESSURE: 138 MMHG | RESPIRATION RATE: 16 BRPM | TEMPERATURE: 98 F | OXYGEN SATURATION: 100 % | DIASTOLIC BLOOD PRESSURE: 77 MMHG | HEART RATE: 69 BPM

## 2018-04-07 LAB
ALBUMIN SERPL ELPH-MCNC: 4.4 G/DL — SIGNIFICANT CHANGE UP (ref 3.3–5)
ALP SERPL-CCNC: 45 U/L — SIGNIFICANT CHANGE UP (ref 40–120)
ALT FLD-CCNC: 32 U/L — SIGNIFICANT CHANGE UP (ref 4–41)
AST SERPL-CCNC: 34 U/L — SIGNIFICANT CHANGE UP (ref 4–40)
BASE EXCESS BLDV CALC-SCNC: 3.7 MMOL/L — SIGNIFICANT CHANGE UP
BASOPHILS # BLD AUTO: 0.05 K/UL — SIGNIFICANT CHANGE UP (ref 0–0.2)
BASOPHILS NFR BLD AUTO: 1 % — SIGNIFICANT CHANGE UP (ref 0–2)
BILIRUB SERPL-MCNC: 0.3 MG/DL — SIGNIFICANT CHANGE UP (ref 0.2–1.2)
BLOOD GAS VENOUS - CREATININE: 1.6 MG/DL — HIGH (ref 0.5–1.3)
BUN SERPL-MCNC: 15 MG/DL — SIGNIFICANT CHANGE UP (ref 7–23)
CALCIUM SERPL-MCNC: 9 MG/DL — SIGNIFICANT CHANGE UP (ref 8.4–10.5)
CHLORIDE BLDV-SCNC: 104 MMOL/L — SIGNIFICANT CHANGE UP (ref 96–108)
CHLORIDE SERPL-SCNC: 101 MMOL/L — SIGNIFICANT CHANGE UP (ref 98–107)
CO2 SERPL-SCNC: 26 MMOL/L — SIGNIFICANT CHANGE UP (ref 22–31)
CREAT SERPL-MCNC: 1.75 MG/DL — HIGH (ref 0.5–1.3)
EOSINOPHIL # BLD AUTO: 0.13 K/UL — SIGNIFICANT CHANGE UP (ref 0–0.5)
EOSINOPHIL NFR BLD AUTO: 2.6 % — SIGNIFICANT CHANGE UP (ref 0–6)
GAS PNL BLDV: 137 MMOL/L — SIGNIFICANT CHANGE UP (ref 136–146)
GLUCOSE BLDV-MCNC: 95 — SIGNIFICANT CHANGE UP (ref 70–99)
GLUCOSE SERPL-MCNC: 102 MG/DL — HIGH (ref 70–99)
HBA1C BLD-MCNC: 5.9 % — HIGH (ref 4–5.6)
HCO3 BLDV-SCNC: 25 MMOL/L — SIGNIFICANT CHANGE UP (ref 20–27)
HCT VFR BLD CALC: 50.5 % — HIGH (ref 39–50)
HCT VFR BLDV CALC: 53.6 % — HIGH (ref 39–51)
HGB BLD-MCNC: 16.7 G/DL — SIGNIFICANT CHANGE UP (ref 13–17)
HGB BLDV-MCNC: 17.5 G/DL — HIGH (ref 13–17)
IMM GRANULOCYTES # BLD AUTO: 0.01 # — SIGNIFICANT CHANGE UP
IMM GRANULOCYTES NFR BLD AUTO: 0.2 % — SIGNIFICANT CHANGE UP (ref 0–1.5)
LACTATE BLDV-MCNC: 1.3 MMOL/L — SIGNIFICANT CHANGE UP (ref 0.5–2)
LYMPHOCYTES # BLD AUTO: 1.72 K/UL — SIGNIFICANT CHANGE UP (ref 1–3.3)
LYMPHOCYTES # BLD AUTO: 34.5 % — SIGNIFICANT CHANGE UP (ref 13–44)
MAGNESIUM SERPL-MCNC: 2.3 MG/DL — SIGNIFICANT CHANGE UP (ref 1.6–2.6)
MCHC RBC-ENTMCNC: 28 PG — SIGNIFICANT CHANGE UP (ref 27–34)
MCHC RBC-ENTMCNC: 33.1 % — SIGNIFICANT CHANGE UP (ref 32–36)
MCV RBC AUTO: 84.6 FL — SIGNIFICANT CHANGE UP (ref 80–100)
MONOCYTES # BLD AUTO: 0.79 K/UL — SIGNIFICANT CHANGE UP (ref 0–0.9)
MONOCYTES NFR BLD AUTO: 15.9 % — HIGH (ref 2–14)
NEUTROPHILS # BLD AUTO: 2.28 K/UL — SIGNIFICANT CHANGE UP (ref 1.8–7.4)
NEUTROPHILS NFR BLD AUTO: 45.8 % — SIGNIFICANT CHANGE UP (ref 43–77)
NRBC # FLD: 0 — SIGNIFICANT CHANGE UP
PCO2 BLDV: 54 MMHG — HIGH (ref 41–51)
PH BLDV: 7.35 PH — SIGNIFICANT CHANGE UP (ref 7.32–7.43)
PHOSPHATE SERPL-MCNC: 3.7 MG/DL — SIGNIFICANT CHANGE UP (ref 2.5–4.5)
PLATELET # BLD AUTO: 255 K/UL — SIGNIFICANT CHANGE UP (ref 150–400)
PMV BLD: 10.8 FL — SIGNIFICANT CHANGE UP (ref 7–13)
PO2 BLDV: 39 MMHG — SIGNIFICANT CHANGE UP (ref 35–40)
POTASSIUM BLDV-SCNC: 4.2 MMOL/L — SIGNIFICANT CHANGE UP (ref 3.4–4.5)
POTASSIUM SERPL-MCNC: 4.4 MMOL/L — SIGNIFICANT CHANGE UP (ref 3.5–5.3)
POTASSIUM SERPL-SCNC: 4.4 MMOL/L — SIGNIFICANT CHANGE UP (ref 3.5–5.3)
PROT SERPL-MCNC: 7.8 G/DL — SIGNIFICANT CHANGE UP (ref 6–8.3)
RBC # BLD: 5.97 M/UL — HIGH (ref 4.2–5.8)
RBC # FLD: 13.4 % — SIGNIFICANT CHANGE UP (ref 10.3–14.5)
SAO2 % BLDV: 66.5 % — SIGNIFICANT CHANGE UP (ref 60–85)
SODIUM SERPL-SCNC: 138 MMOL/L — SIGNIFICANT CHANGE UP (ref 135–145)
WBC # BLD: 4.98 K/UL — SIGNIFICANT CHANGE UP (ref 3.8–10.5)
WBC # FLD AUTO: 4.98 K/UL — SIGNIFICANT CHANGE UP (ref 3.8–10.5)

## 2018-04-07 PROCEDURE — 76882 US LMTD JT/FCL EVL NVASC XTR: CPT | Mod: 26,RT

## 2018-04-07 PROCEDURE — 10060 I&D ABSCESS SIMPLE/SINGLE: CPT

## 2018-04-07 PROCEDURE — 73120 X-RAY EXAM OF HAND: CPT | Mod: 26,RT

## 2018-04-07 PROCEDURE — 99218: CPT | Mod: 25

## 2018-04-07 RX ORDER — KETOROLAC TROMETHAMINE 30 MG/ML
30 SYRINGE (ML) INJECTION ONCE
Qty: 0 | Refills: 0 | Status: DISCONTINUED | OUTPATIENT
Start: 2018-04-07 | End: 2018-04-07

## 2018-04-07 RX ORDER — ACETAMINOPHEN 500 MG
650 TABLET ORAL ONCE
Qty: 0 | Refills: 0 | Status: COMPLETED | OUTPATIENT
Start: 2018-04-07 | End: 2018-04-07

## 2018-04-07 RX ORDER — SODIUM CHLORIDE 9 MG/ML
1000 INJECTION INTRAMUSCULAR; INTRAVENOUS; SUBCUTANEOUS ONCE
Qty: 0 | Refills: 0 | Status: COMPLETED | OUTPATIENT
Start: 2018-04-07 | End: 2018-04-07

## 2018-04-07 RX ADMIN — SODIUM CHLORIDE 1000 MILLILITER(S): 9 INJECTION INTRAMUSCULAR; INTRAVENOUS; SUBCUTANEOUS at 08:11

## 2018-04-07 RX ADMIN — Medication 100 MILLIGRAM(S): at 08:11

## 2018-04-07 RX ADMIN — Medication 30 MILLIGRAM(S): at 10:19

## 2018-04-07 RX ADMIN — Medication 100 MILLIGRAM(S): at 13:52

## 2018-04-07 RX ADMIN — Medication 30 MILLIGRAM(S): at 11:00

## 2018-04-07 RX ADMIN — Medication 650 MILLIGRAM(S): at 10:15

## 2018-04-07 RX ADMIN — Medication 650 MILLIGRAM(S): at 07:54

## 2018-04-07 NOTE — ED CDU PROVIDER INITIAL DAY NOTE - PHYSICAL EXAMINATION
very well appearing  rt 3rd very well appearing  rt 3rd digit - (+) edema to dorsum of digit, inferiorly to DIP joint, s/p I&D small incision noted, minimal drainage; no streaking; FROM of the digit, no evidence of tenosynovitis, NVI; good , 2+ radial pulse.

## 2018-04-07 NOTE — ED CDU PROVIDER INITIAL DAY NOTE - PROGRESS NOTE DETAILS
Pt noted to have  elevated creatinine, hydration advised but pt declined and wishes to leave. will advise to f/u with IM doctor, A1C pending, admit pa called to f/u on the results and to call the pt. Pt noted to have  elevated creatinine, hydration advised but pt declined and wishes to leave. will advise to f/u with IM doctor, A1C pending, admit pa called to f/u on the results and to call the pt. As per pt edema decreased significantly with elevation; will advise to return to er in 2 days for wound check.

## 2018-04-07 NOTE — ED ADULT NURSE NOTE - OBJECTIVE STATEMENT
Pt received A&Ox3 to ED Rm 10 with c/o pain & swelling to R middle finger x 6d. Denies trauma - states, "I just thought it was an ingrown hair." Denies fever or any other adverse symptom at this time. RR equal & unlabored. Labs sent per MD orders. RX given per MD orders. Awaiting xray. Family at bedside.

## 2018-04-07 NOTE — ED CDU PROVIDER INITIAL DAY NOTE - MEDICAL DECISION MAKING DETAILS
Adriana: R middle finger cellulitis complicating Adriana: R middle finger cellulitis complicating a small dorsal abscess at the middle phalanx that was I & D'd in the ER. Admit to CDU for IV Clinda and elevation.

## 2018-04-07 NOTE — ED PROVIDER NOTE - ATTENDING CONTRIBUTION TO CARE
I was physically present for the E/M service provided. I agree with above history, physical, and plan which I have reviewed and edited where appropriate. I was physically present for the key portions of the service provided.    Afebrile. Right 3rd digit dorsal swelling moderate proximal phalanx with erythema. No TTP flexor surface. ?Abscess on POCUS. I&D performed per note with minimal pus extraction. IV Abx given failure outpt tx and observation given h/o DM.

## 2018-04-07 NOTE — ED ADULT TRIAGE NOTE - CHIEF COMPLAINT QUOTE
Pt walk in c/o Pain Swelling Pus-filled wound on Right Middle Finger. Seen by PMD last Monday and given Abx but claimed not getting any better and worsening.

## 2018-04-07 NOTE — ED PROVIDER NOTE - OBJECTIVE STATEMENT
51 y/o male with pmhx of GERD presents to ED for right middle finger redness/swelling x 1 week. States developed a "pimple from an ingrown hair" on the top of his finger first. Pt seen at urgent care 6 days ago and given Bactrim and return precautions, told to come to ED if gets worse. Pt has been taking Bactrim for last 3 days with worsening swelling. No fever, chills, cp, sob, recent travel, weakness, numbness, tingling, streaking, discharge/pus. No hx of diabetes.

## 2018-04-07 NOTE — ED CDU PROVIDER DISPOSITION NOTE - CLINICAL COURSE
Immunocompetent pt with Rt 3rd digit cellulitis/abscess, drained by ER, wished to leave after 2nd dose of Clida, stated sx improved. Pt noted to have  elevated creatinine, hydration advised but pt declined and wishes to leave. will advise to f/u with IM doctor, A1C pending, admit pa called to f/u on the results and to call the pt. advised to return to er immediately for worsening sx. Immunocompetent pt with Rt 3rd digit cellulitis/abscess, drained by ER, wished to leave after 2nd dose of Clinda, stated sx improved. Pt noted to have  elevated creatinine, hydration advised but pt declined and wishes to leave. will advise to f/u with IM doctor, A1C pending, admit pa called to f/u on the results and to call the pt. advised to return to er immediately for worsening sx. Immunocompetent pt with Rt 3rd digit cellulitis/abscess, drained by ER, wished to leave after 2nd dose of Clinda, stated sx improved. Pt noted to have  elevated creatinine, hydration advised but pt declined and wishes to leave. will advise to f/u with IM doctor, A1C pending, admit pa called to f/u on the results and to call the pt. advised to return to er immediately for worsening sx. Pt also given option to return to er in 2 days for wound check.

## 2018-04-07 NOTE — ED CDU PROVIDER INITIAL DAY NOTE - OBJECTIVE STATEMENT
Pt is 51 y/o male, Rt hand dominant, with no significant PMHx here for eval of pain and swelling to Rt 3rd digit. Pt states that he initially noted small "bump" to the dorsum of the digit about 5-6 days ago, noticed a "pimple" 4 days ago which he tried to squeezes, shortly after noted increased swelling, redness and drainage. Pt was seen and evaluated at  on 4/4, given bactrim which he has been complainant with, but since the sx were getting worse pt decided to come to the ER. Denies fever, chills, streaking, sensation loss, denies insect bites, hx of immunosuppressed state or MRSA, no recent hospitalizations, no pcp.

## 2018-04-07 NOTE — ED CDU PROVIDER DISPOSITION NOTE - ATTENDING CONTRIBUTION TO CARE
I performed a face-to-face evaluation of the patient and performed a history and physical examination. I agree with the history and physical examination.    R middle finger small abscess and cellulitis. Treated w/ Clinda IV. Treat w/ Clinda PO.

## 2018-04-07 NOTE — ED CDU PROVIDER DISPOSITION NOTE - PLAN OF CARE
Take medications as prescribed. If not improving in 2 days (fever, or more swelling, pus, or pain), follow up with your healthcare provider, or return to the Emergency Department. Elevate finger when possible.

## 2018-04-07 NOTE — ED CDU PROVIDER INITIAL DAY NOTE - ATTENDING CONTRIBUTION TO CARE
I performed a face-to-face evaluation of the patient and performed a history and physical examination. I agree with the history and physical examination.    Adriana: R middle finger cellulitis complicating a small dorsal abscess at the middle phalanx that was I & D'd in the ER. Admit to CDU for IV Clinda and elevation.

## 2018-04-07 NOTE — ED PROVIDER NOTE - MEDICAL DECISION MAKING DETAILS
51 y/o male with pmhx of GERD presents to ED for right middle finger redness/swelling x 1 week, found with cellulitis. Questionable collection as per Dr. Ballesteros on bedside ultrasound. will I and D, labs, IV fluids and IV clindamycin, xray, likely cdu for IV abx.

## 2018-04-08 LAB
SPECIMEN SOURCE: SIGNIFICANT CHANGE UP
SPECIMEN SOURCE: SIGNIFICANT CHANGE UP

## 2018-04-12 LAB
BACTERIA BLD CULT: SIGNIFICANT CHANGE UP
BACTERIA BLD CULT: SIGNIFICANT CHANGE UP

## 2018-04-13 NOTE — ED PROCEDURE NOTE - PROCEDURE ADDITIONAL DETAILS
Right 3th digit proximal dorsal swelling  r/o abscess. Scanned in 2D US in 2 planes.  1.7 x 0.3 cm anechoic region subcutaneous  Impression: Abscess of right 3 rd digit  [id number]
1.7 cm x 0.34 cm anechoic pocket right 3rd dorsal proximal phalanx.

## 2018-08-24 ENCOUNTER — EMERGENCY (EMERGENCY)
Facility: HOSPITAL | Age: 53
LOS: 0 days | Discharge: HOME | End: 2018-08-24
Attending: EMERGENCY MEDICINE
Payer: OTHER MISCELLANEOUS

## 2018-08-24 VITALS
OXYGEN SATURATION: 96 % | DIASTOLIC BLOOD PRESSURE: 85 MMHG | HEART RATE: 67 BPM | WEIGHT: 225.09 LBS | TEMPERATURE: 98 F | HEIGHT: 69 IN | RESPIRATION RATE: 16 BRPM | SYSTOLIC BLOOD PRESSURE: 154 MMHG

## 2018-08-24 DIAGNOSIS — S83.91XA SPRAIN OF UNSPECIFIED SITE OF RIGHT KNEE, INITIAL ENCOUNTER: ICD-10-CM

## 2018-08-24 DIAGNOSIS — M25.561 PAIN IN RIGHT KNEE: ICD-10-CM
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
## 2018-08-24 DIAGNOSIS — Z79.1 LONG TERM (CURRENT) USE OF NON-STEROIDAL ANTI-INFLAMMATORIES (NSAID): ICD-10-CM

## 2018-08-24 DIAGNOSIS — K21.9 GASTRO-ESOPHAGEAL REFLUX DISEASE WITHOUT ESOPHAGITIS: ICD-10-CM
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
## 2018-08-24 DIAGNOSIS — Z79.82 LONG TERM (CURRENT) USE OF ASPIRIN: ICD-10-CM

## 2018-08-24 PROCEDURE — 99283 EMERGENCY DEPT VISIT LOW MDM: CPT | Mod: 25

## 2018-08-24 PROCEDURE — 99053 MED SERV 10PM-8AM 24 HR FAC: CPT

## 2018-08-24 PROCEDURE — 73562 X-RAY EXAM OF KNEE 3: CPT | Mod: 26,RT

## 2018-08-24 RX ORDER — OXYCODONE AND ACETAMINOPHEN 5; 325 MG/1; MG/1
2 TABLET ORAL ONCE
Qty: 0 | Refills: 0 | Status: DISCONTINUED | OUTPATIENT
Start: 2018-08-24 | End: 2018-08-24

## 2018-08-24 RX ORDER — IBUPROFEN 200 MG
1 TABLET ORAL
Qty: 30 | Refills: 0 | OUTPATIENT
Start: 2018-08-24 | End: 2018-09-22

## 2018-08-24 RX ORDER — OXYCODONE HYDROCHLORIDE 5 MG/1
1 TABLET ORAL
Qty: 20 | Refills: 0 | OUTPATIENT
Start: 2018-08-24 | End: 2018-08-25

## 2018-08-24 RX ADMIN — OXYCODONE AND ACETAMINOPHEN 2 TABLET(S): 5; 325 TABLET ORAL at 03:17

## 2018-08-24 NOTE — ED ADULT NURSE NOTE - NSIMPLEMENTINTERV_GEN_ALL_ED
Implemented All Universal Safety Interventions:  [place] to call system. Call bell, personal items and telephone within reach. Instruct patient to call for assistance. Room bathroom lighting operational. Non-slip footwear when patient is off stretcher. Physically safe environment: no spills, clutter or unnecessary equipment. Stretcher in lowest position, wheels locked, appropriate side rails in place.

## 2018-08-24 NOTE — ED PROVIDER NOTE - PHYSICAL EXAMINATION
General:     NAD, well-nourished, well-appearing  Head:     NC/AT, EOMI, oral mucosa moist  Neck:     trachea midline  Lungs:     CTA b/l, no w/r/r  CVS:     S1S2, RRR, no m/g/r  Abd:     +BS, s/nt/nd, no organomegaly  Ext:    2+ radial and pedal pulses, R LE:  from/nt @ right hip, ankle. trace knee effusion.  pain with flexion @ knee. from @ knee.   Neuro: AAOx3, no sensory/motor deficits
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
## 2018-08-24 NOTE — ED ADULT TRIAGE NOTE - CHIEF COMPLAINT QUOTE
pt c/o R-knee pain s/p injury at work at approx 0130hrs.  pt stated he was going up a truck step and the step broke

## 2018-12-18 ENCOUNTER — EMERGENCY (EMERGENCY)
Facility: HOSPITAL | Age: 53
LOS: 0 days | Discharge: HOME | End: 2018-12-18
Attending: EMERGENCY MEDICINE
Payer: OTHER MISCELLANEOUS

## 2018-12-18 VITALS
TEMPERATURE: 98 F | WEIGHT: 225.09 LBS | HEART RATE: 63 BPM | OXYGEN SATURATION: 99 % | DIASTOLIC BLOOD PRESSURE: 98 MMHG | HEIGHT: 69 IN | RESPIRATION RATE: 19 BRPM | SYSTOLIC BLOOD PRESSURE: 156 MMHG

## 2018-12-18 DIAGNOSIS — M25.519 PAIN IN UNSPECIFIED SHOULDER: ICD-10-CM
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
## 2018-12-18 DIAGNOSIS — S39.012A STRAIN OF MUSCLE, FASCIA AND TENDON OF LOWER BACK, INITIAL ENCOUNTER: ICD-10-CM
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
## 2018-12-18 DIAGNOSIS — S53.402A UNSPECIFIED SPRAIN OF LEFT ELBOW, INITIAL ENCOUNTER: ICD-10-CM

## 2018-12-18 DIAGNOSIS — M54.9 DORSALGIA, UNSPECIFIED: ICD-10-CM

## 2018-12-18 PROCEDURE — 73080 X-RAY EXAM OF ELBOW: CPT | Mod: 26,LT

## 2018-12-18 PROCEDURE — 73030 X-RAY EXAM OF SHOULDER: CPT | Mod: 26,RT

## 2018-12-18 PROCEDURE — 72100 X-RAY EXAM L-S SPINE 2/3 VWS: CPT | Mod: 26

## 2018-12-18 PROCEDURE — 99283 EMERGENCY DEPT VISIT LOW MDM: CPT

## 2018-12-18 RX ORDER — IBUPROFEN 200 MG
600 TABLET ORAL ONCE
Qty: 0 | Refills: 0 | Status: COMPLETED | OUTPATIENT
Start: 2018-12-18 | End: 2018-12-18

## 2018-12-18 RX ADMIN — Medication 600 MILLIGRAM(S): at 12:03

## 2018-12-18 NOTE — ED ADULT NURSE NOTE - OBJECTIVE STATEMENT
Patient was lifting up garbage and hurt right shoulder, lower back and left elbow. patient felt burning sensation in the left bicep. patient is ambulatory and able to move extremities

## 2018-12-18 NOTE — ED PROVIDER NOTE - QUALITY
Patient made aware, and verbalized understanding.  She states her daughter has the paperwork that was given to them.  I went over her medications with her, and she seemed to know what to do.  I also called Mila as patient told me ok to do so.    Mila is requesting home nursing come in to help with medication regimen a few times. She states son can not help because he works.    I see an order for home care nursing therapy, but looks like for PT and OT.    Please advise.   aching

## 2018-12-18 NOTE — ED PROVIDER NOTE - MUSCULOSKELETAL, MLM
Spine appears normal, range of motion is not limited, + right lateral mid lumbar, right posterior shoulder and left lateral elbow tenderness

## 2018-12-18 NOTE — ED PROVIDER NOTE - CARE PLAN
Principal Discharge DX:	Lumbar strain, initial encounter  Secondary Diagnosis:	Shoulder strain, right, initial encounter  Secondary Diagnosis:	Elbow sprain, left, initial encounter

## 2018-12-18 NOTE — ED PROVIDER NOTE - CARE PROVIDER_API CALL
Lopez Ayala), Orthopaedic Surgery  [facility] [place] [address]  Phone: [phone]  Fax: [phone]

## 2018-12-18 NOTE — ED ADULT TRIAGE NOTE - CHIEF COMPLAINT QUOTE
patient c/o of back pain  R shoulder pain and L elbow pain , patient injure himself at work today , denied hitting head

## 2019-10-23 NOTE — ED PROCEDURE NOTE - NS_EDPROVIDERDISPOUSERTYPE_ED_A_ED
Attending Attestation (For Attendings USE Only)...
No adenopathy or splenomegaly. No cervical or inguinal lymphadenopathy.

## 2020-02-25 NOTE — ED ADULT TRIAGE NOTE - HEIGHT IN CM
175.26 Topical Sulfur Applications Counseling: Topical Sulfur Counseling: Patient counseled that this medication may cause skin irritation or allergic reactions.  In the event of skin irritation, the patient was advised to reduce the amount of the drug applied or use it less frequently.   The patient verbalized understanding of the proper use and possible adverse effects of topical sulfur application.  All of the patient's questions and concerns were addressed.

## 2020-05-26 ENCOUNTER — APPOINTMENT (OUTPATIENT)
Dept: DISASTER EMERGENCY | Facility: CLINIC | Age: 55
End: 2020-05-26

## 2020-05-27 LAB — SARS-COV-2 N GENE NPH QL NAA+PROBE: NOT DETECTED

## 2020-06-06 ENCOUNTER — APPOINTMENT (OUTPATIENT)
Dept: DISASTER EMERGENCY | Facility: CLINIC | Age: 55
End: 2020-06-06

## 2020-06-06 DIAGNOSIS — Z01.818 ENCOUNTER FOR OTHER PREPROCEDURAL EXAMINATION: ICD-10-CM

## 2020-06-06 LAB — SARS-COV-2 N GENE NPH QL NAA+PROBE: NOT DETECTED

## 2020-06-09 ENCOUNTER — RESULT REVIEW (OUTPATIENT)
Age: 55
End: 2020-06-09

## 2020-09-15 ENCOUNTER — TRANSCRIPTION ENCOUNTER (OUTPATIENT)
Age: 55
End: 2020-09-15

## 2020-11-13 ENCOUNTER — EMERGENCY (EMERGENCY)
Facility: HOSPITAL | Age: 55
LOS: 0 days | Discharge: HOME | End: 2020-11-14
Attending: STUDENT IN AN ORGANIZED HEALTH CARE EDUCATION/TRAINING PROGRAM
Payer: COMMERCIAL

## 2020-11-13 VITALS
HEIGHT: 69 IN | RESPIRATION RATE: 16 BRPM | HEART RATE: 108 BPM | OXYGEN SATURATION: 97 % | WEIGHT: 220.02 LBS | DIASTOLIC BLOOD PRESSURE: 122 MMHG | TEMPERATURE: 98 F | SYSTOLIC BLOOD PRESSURE: 172 MMHG

## 2020-11-13 DIAGNOSIS — M62.82 RHABDOMYOLYSIS: ICD-10-CM
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
## 2020-11-13 DIAGNOSIS — T46.7X5A ADVERSE EFFECT OF PERIPHERAL VASODILATORS, INITIAL ENCOUNTER: ICD-10-CM

## 2020-11-13 DIAGNOSIS — F99 MENTAL DISORDER, NOT OTHERWISE SPECIFIED: ICD-10-CM

## 2020-11-13 DIAGNOSIS — K21.9 GASTRO-ESOPHAGEAL REFLUX DISEASE WITHOUT ESOPHAGITIS: ICD-10-CM

## 2020-11-13 LAB
ALBUMIN SERPL ELPH-MCNC: 3.8 G/DL — SIGNIFICANT CHANGE UP (ref 3.3–5)
ALP SERPL-CCNC: 59 U/L — SIGNIFICANT CHANGE UP (ref 40–120)
ALT FLD-CCNC: 47 U/L — SIGNIFICANT CHANGE UP (ref 12–78)
ANION GAP SERPL CALC-SCNC: 9 MMOL/L — SIGNIFICANT CHANGE UP (ref 5–17)
APAP SERPL-MCNC: < 2 UG/ML (ref 10–30)
AST SERPL-CCNC: 85 U/L — HIGH (ref 15–37)
BASOPHILS # BLD AUTO: 0.11 K/UL — SIGNIFICANT CHANGE UP (ref 0–0.2)
BASOPHILS NFR BLD AUTO: 1.6 % — SIGNIFICANT CHANGE UP (ref 0–2)
BILIRUB SERPL-MCNC: 1 MG/DL — SIGNIFICANT CHANGE UP (ref 0.2–1.2)
BUN SERPL-MCNC: 12 MG/DL — SIGNIFICANT CHANGE UP (ref 7–23)
CALCIUM SERPL-MCNC: 8.4 MG/DL — LOW (ref 8.5–10.1)
CHLORIDE SERPL-SCNC: 100 MMOL/L — SIGNIFICANT CHANGE UP (ref 96–108)
CK MB BLD-MCNC: 1.5 % — SIGNIFICANT CHANGE UP (ref 0–3.5)
CK MB CFR SERPL CALC: 34.7 NG/ML — HIGH (ref 0.5–3.6)
CK SERPL-CCNC: 2343 U/L — HIGH (ref 26–308)
CO2 SERPL-SCNC: 28 MMOL/L — SIGNIFICANT CHANGE UP (ref 22–31)
CREAT SERPL-MCNC: 1.32 MG/DL — HIGH (ref 0.5–1.3)
EOSINOPHIL # BLD AUTO: 0.83 K/UL — HIGH (ref 0–0.5)
EOSINOPHIL NFR BLD AUTO: 11.8 % — HIGH (ref 0–6)
ETHANOL SERPL-MCNC: <10 MG/DL — SIGNIFICANT CHANGE UP (ref 0–10)
GLUCOSE SERPL-MCNC: 68 MG/DL — LOW (ref 70–99)
HCT VFR BLD CALC: 48 % — SIGNIFICANT CHANGE UP (ref 39–50)
HGB BLD-MCNC: 16.4 G/DL — SIGNIFICANT CHANGE UP (ref 13–17)
IMM GRANULOCYTES NFR BLD AUTO: 0.1 % — SIGNIFICANT CHANGE UP (ref 0–1.5)
LYMPHOCYTES # BLD AUTO: 1.78 K/UL — SIGNIFICANT CHANGE UP (ref 1–3.3)
LYMPHOCYTES # BLD AUTO: 25.2 % — SIGNIFICANT CHANGE UP (ref 13–44)
MCHC RBC-ENTMCNC: 29.1 PG — SIGNIFICANT CHANGE UP (ref 27–34)
MCHC RBC-ENTMCNC: 34.2 GM/DL — SIGNIFICANT CHANGE UP (ref 32–36)
MCV RBC AUTO: 85.3 FL — SIGNIFICANT CHANGE UP (ref 80–100)
MONOCYTES # BLD AUTO: 1.16 K/UL — HIGH (ref 0–0.9)
MONOCYTES NFR BLD AUTO: 16.4 % — HIGH (ref 2–14)
NEUTROPHILS # BLD AUTO: 3.17 K/UL — SIGNIFICANT CHANGE UP (ref 1.8–7.4)
NEUTROPHILS NFR BLD AUTO: 44.9 % — SIGNIFICANT CHANGE UP (ref 43–77)
NRBC # BLD: 0 /100 WBCS — SIGNIFICANT CHANGE UP (ref 0–0)
PCP SPEC-MCNC: SIGNIFICANT CHANGE UP
PLATELET # BLD AUTO: 280 K/UL — SIGNIFICANT CHANGE UP (ref 150–400)
POTASSIUM SERPL-MCNC: 3.4 MMOL/L — LOW (ref 3.5–5.3)
POTASSIUM SERPL-SCNC: 3.4 MMOL/L — LOW (ref 3.5–5.3)
PROT SERPL-MCNC: 7.9 GM/DL — SIGNIFICANT CHANGE UP (ref 6–8.3)
RBC # BLD: 5.63 M/UL — SIGNIFICANT CHANGE UP (ref 4.2–5.8)
RBC # FLD: 13.7 % — SIGNIFICANT CHANGE UP (ref 10.3–14.5)
SALICYLATES SERPL-MCNC: <1.7 MG/DL — LOW (ref 2.8–20)
SODIUM SERPL-SCNC: 137 MMOL/L — SIGNIFICANT CHANGE UP (ref 135–145)
TROPONIN I SERPL-MCNC: <.015 NG/ML — SIGNIFICANT CHANGE UP (ref 0.01–0.04)
WBC # BLD: 7.06 K/UL — SIGNIFICANT CHANGE UP (ref 3.8–10.5)
WBC # FLD AUTO: 7.06 K/UL — SIGNIFICANT CHANGE UP (ref 3.8–10.5)

## 2020-11-13 PROCEDURE — 93010 ELECTROCARDIOGRAM REPORT: CPT

## 2020-11-13 PROCEDURE — 99285 EMERGENCY DEPT VISIT HI MDM: CPT

## 2020-11-13 RX ORDER — SODIUM CHLORIDE 9 MG/ML
2000 INJECTION INTRAMUSCULAR; INTRAVENOUS; SUBCUTANEOUS ONCE
Refills: 0 | Status: COMPLETED | OUTPATIENT
Start: 2020-11-13 | End: 2020-11-13

## 2020-11-13 RX ORDER — DEXLANSOPRAZOLE 30 MG/1
1 CAPSULE, DELAYED RELEASE ORAL
Qty: 0 | Refills: 0 | DISCHARGE

## 2020-11-13 RX ADMIN — SODIUM CHLORIDE 1000 MILLILITER(S): 9 INJECTION INTRAMUSCULAR; INTRAVENOUS; SUBCUTANEOUS at 23:04

## 2020-11-13 NOTE — ED ADULT NURSE NOTE - NSIMPLEMENTINTERV_GEN_ALL_ED
Implemented All Universal Safety Interventions:  [place] to call system. Call bell, personal items and telephone within reach. Instruct patient to call for assistance. Room bathroom lighting operational. Non-slip footwear when patient is off stretcher. Physically safe environment: no spills, clutter or unnecessary equipment. Stretcher in lowest position, wheels locked, appropriate side rails in place.

## 2020-11-13 NOTE — ED ADULT TRIAGE NOTE - IDEAL BODY WEIGHT(KG)
[facility]patient [facility]fusion [facility] Short Visit Progress Note    9598 Pt admit to [facility] for Hydration/Zofran ambulatory in stable condition. Assessment completed. No new concerns voiced. Patient Vitals for the past 12 hrs:   Temp Pulse Resp BP   03/08/20 0857 96.8 °F (36 °C) 81 18 97/70       Port accessed with positive blood return, flushes well. Medications:  Medications Administered     heparin (porcine) pf 500 Units     Admin Date  03/08/2020 Action  Given Dose  500 Units Route  IntraVENous Administered By  Rush Kim A          lactated ringers bolus infusion 1,000 mL     Admin Date  03/08/2020 Action  New Bag Dose  1000 mL Rate  1,000 mL/hr Route  IntraVENous Administered By  Addison Hernandez RN          ondansetron [facility]) injection 8 mg     Admin Date  03/08/2020 Action  Given Dose  8 mg Route  IntraVENous Administered By  Addison Hernandez RN          sodium chloride (NS) flush 5-10 mL     Admin Date  03/08/2020 Action  Given Dose  10 mL Route  IntraVENous Administered By  Addison Hernandez RN           Admin Date  03/08/2020 Action  Given Dose  10 mL Route  IntraVENous Administered By  Addison Hernandez RN           Admin Date  03/08/2020 Action  Given Dose  10 mL Route  IntraVENous Administered By  Laurie Art, [address] Pt tolerated treatment well, port flusehd, heparinized and de-accessed,  D/c home ambulatory in no distress. Pt aware of next appointment scheduled for 3/18/20. 71

## 2020-11-13 NOTE — ED ADULT NURSE NOTE - ED STAT RN HANDOFF DETAILS 3
pt is sleeping. easy arousable, alert when awake. Cpk test done waiting for result. report given to MESSI Pozo.

## 2020-11-13 NOTE — ED ADULT NURSE NOTE - CHPI ED NUR SYMPTOMS NEG
no decreased eating/drinking/no nausea/no tingling/no fever/no weakness/no dizziness/no chills/no pain/no vomiting

## 2020-11-13 NOTE — ED ADULT NURSE NOTE - ED STAT RN HANDOFF DETAILS
Report received from RN at this time. Assessment available on [facility]. will continue to monitor

## 2020-11-13 NOTE — ED ADULT NURSE NOTE - OBJECTIVE STATEMENT
"I have asthma and took a pill called lift off for energy and that is why I am here" NAD. Pt is restless and states he wants to sleep.
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
## 2020-11-14 VITALS
HEART RATE: 61 BPM | OXYGEN SATURATION: 96 % | SYSTOLIC BLOOD PRESSURE: 128 MMHG | DIASTOLIC BLOOD PRESSURE: 53 MMHG | RESPIRATION RATE: 15 BRPM

## 2020-11-14 LAB
AMPHET UR-MCNC: NEGATIVE — SIGNIFICANT CHANGE UP
APPEARANCE UR: CLEAR — SIGNIFICANT CHANGE UP
BACTERIA # UR AUTO: ABNORMAL
BARBITURATES UR SCN-MCNC: NEGATIVE — SIGNIFICANT CHANGE UP
BENZODIAZ UR-MCNC: NEGATIVE — SIGNIFICANT CHANGE UP
BILIRUB UR-MCNC: NEGATIVE — SIGNIFICANT CHANGE UP
CK SERPL-CCNC: 1952 U/L — HIGH (ref 26–308)
COCAINE METAB.OTHER UR-MCNC: POSITIVE — SIGNIFICANT CHANGE UP
COLOR SPEC: YELLOW — SIGNIFICANT CHANGE UP
DIFF PNL FLD: ABNORMAL
GLUCOSE UR QL: NEGATIVE MG/DL — SIGNIFICANT CHANGE UP
KETONES UR-MCNC: ABNORMAL
LEUKOCYTE ESTERASE UR-ACNC: ABNORMAL
METHADONE UR-MCNC: NEGATIVE — SIGNIFICANT CHANGE UP
NITRITE UR-MCNC: NEGATIVE — SIGNIFICANT CHANGE UP
OPIATES UR-MCNC: NEGATIVE — SIGNIFICANT CHANGE UP
PCP UR-MCNC: NEGATIVE — SIGNIFICANT CHANGE UP
PH UR: 6 — SIGNIFICANT CHANGE UP (ref 5–8)
PROT UR-MCNC: 15 MG/DL
RBC CASTS # UR COMP ASSIST: SIGNIFICANT CHANGE UP /HPF (ref 0–4)
SARS-COV-2 RNA SPEC QL NAA+PROBE: SIGNIFICANT CHANGE UP
SP GR SPEC: 1.02 — SIGNIFICANT CHANGE UP (ref 1.01–1.02)
THC UR QL: NEGATIVE — SIGNIFICANT CHANGE UP
UROBILINOGEN FLD QL: NEGATIVE MG/DL — SIGNIFICANT CHANGE UP
WBC UR QL: SIGNIFICANT CHANGE UP

## 2020-11-14 RX ADMIN — SODIUM CHLORIDE 2000 MILLILITER(S): 9 INJECTION INTRAMUSCULAR; INTRAVENOUS; SUBCUTANEOUS at 01:34

## 2020-11-14 NOTE — ED PROVIDER NOTE - PROGRESS NOTE DETAILS
pt is resting, at times waking up talking to himself eating, ambulating with steady gait pt is sleeping not restless, sleeping but arousable and states that he feels better
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
## 2020-11-14 NOTE — ED PROVIDER NOTE - PATIENT PORTAL LINK FT
You can access the FollowMyHealth Patient Portal offered by [facility] by registering at the following website: [email or web address]. By joining [employer]’s FollowMyHealth portal, you will also be able to view your health information using other applications (apps) compatible with our system.

## 2020-11-14 NOTE — ED PROVIDER NOTE - CLINICAL SUMMARY MEDICAL DECISION MAKING FREE TEXT BOX
pt presented after taking viagra, cialis, liftoff herbal life (contains caffeine ) and half cup of [place]istanian rum, pt presented restless, felt like he heard voices, pt was hydrated and observed overnght with improvement, pt no longer restless, able to ambulate with steady gait

## 2020-11-14 NOTE — ED ADULT NURSE REASSESSMENT NOTE - NS ED NURSE REASSESS COMMENT FT1
received report from MESSI Sweet. pt on bed asleep. not in distress, siderails up. safety measures checked.

## 2020-11-15 LAB
SARS-COV-2 IGG SERPL QL IA: NEGATIVE — SIGNIFICANT CHANGE UP
SARS-COV-2 IGM SERPL IA-ACNC: <0.1 INDEX — SIGNIFICANT CHANGE UP

## 2021-05-09 NOTE — ED PROVIDER NOTE - NSTIMEPROVIDERCAREINITIATE_GEN_ER
SW spoke with RN reporting that MD to discharge patient tomorrow. BLS arranged for 10:00am going to pt's home. BUDDY Beasley and RN aware of transportation arrangements.     RAMU Green  Ph: [phone]  P   05-Mar-2018 08:26

## 2022-01-12 NOTE — ED PROVIDER NOTE - NS ED MD EM SELECTION
Attempted to call pt, regarding virtual visit capabilities.  No answer.  LM for rtn call w office number provided.   Pt is a 62 yo M who presents with complaint of cough and shortness of breath x3 days. He states over the last 24 hours he is having pain in his right ribs when he coughs. He developed chills and had a low-grade fever last night. Patient denies any recent sick contacts and states that he is fully vaccinated for COVID-19. No headache, body aches. No loss of his sense of taste or smell. The history is provided by the patient. Past Medical History:   Diagnosis Date    A-fib ([address]     BPH (benign prostatic hyperplasia)     Sarcoidosis        Past Surgical History:   Procedure Laterality Date    HX ORTHOPAEDIC      3 knee surgeries          History reviewed. No pertinent family history. Social History     Socioeconomic History    Marital status:      Spouse name: Not on file    Number of children: Not on file    Years of education: Not on file    Highest education level: Not on file   Occupational History    Not on file   Tobacco Use    Smoking status: Never Smoker    Smokeless tobacco: Never Used   Substance and Sexual Activity    Alcohol use: Yes     Alcohol/week: 3.0 standard drinks     Types: 3 Glasses of wine per week    Drug use: Never    Sexual activity: Not on file   Other Topics Concern    Not on file   Social History Narrative    Not on file     Social Determinants of Health     Financial Resource Strain:     Difficulty of Paying Living Expenses: Not on file   Food Insecurity:     Worried About Running Out of Food in the Last Year: Not on file    Sheri of Food in the Last Year: Not on file   Transportation Needs:     Lack of Transportation (Medical): Not on file    Lack of Transportation (Non-Medical):  Not on file   Physical Activity:     Days of Exercise per Week: Not on file    Minutes of Exercise per Session: Not on file   Stress:     Feeling of Stress : Not on file   Social Connections:     Frequency of Communication with Friends and Family: Not on file    Frequency of Social Gatherings with Friends and Family: Not on file    Attends Tenriism Services: Not on file    Active Member of Clubs or Organizations: Not on file    Attends Club or Organization Meetings: Not on file    Marital Status: Not on file   Intimate Partner Violence:     Fear of Current or Ex-Partner: Not on file    Emotionally Abused: Not on file    Physically Abused: Not on file    Sexually Abused: Not on file   Housing Stability:     Unable to Pay for Housing in the Last Year: Not on file    Number of [place] in the Last Year: Not on file    Unstable Housing in the Last Year: Not on file         ALLERGIES: Amoxicillin    Review of Systems   Constitutional: Positive for chills and fever. Negative for fatigue. HENT: Negative for sore throat. Respiratory: Positive for cough. Negative for chest tightness, shortness of breath and wheezing. Cardiovascular: Positive for chest pain (right lower chest pain with coughing). Gastrointestinal: Negative for abdominal pain, nausea and vomiting. Musculoskeletal: Negative for back pain, myalgias and neck pain. Neurological: Negative for dizziness, weakness and numbness. Psychiatric/Behavioral: Negative for agitation and confusion. Vitals:    01/12/22 1155   BP: (!) 143/97   Pulse: 87   Resp: 16   Temp: 98.8 °F (37.1 °C)   SpO2: 100%   Weight: 117.9 kg (260 lb)   Height: 6' 1\" (1.854 m)            Physical Exam  Vitals and nursing note reviewed. Constitutional:       General: He is not in acute distress. Appearance: He is not ill-appearing or toxic-appearing. HENT:      Head: Normocephalic and atraumatic. Nose: No rhinorrhea. Cardiovascular:      Rate and Rhythm: Normal rate and regular rhythm. Pulses: Normal pulses. Pulmonary:      Effort: Pulmonary effort is normal. No respiratory distress. Breath sounds: Normal breath sounds. No wheezing or rhonchi.       Comments: Pt speaking in full sentences without any signs of distress  Chest:      Chest wall: No tenderness. Abdominal:      Palpations: Abdomen is soft. Skin:     General: Skin is warm and dry. Neurological:      Mental Status: He is alert and oriented to person, place, and time. Psychiatric:         Mood and Affect: Mood normal.         Behavior: Behavior normal.          MDM  Number of Diagnoses or Management Options  Pneumonia of right lower lobe due to infectious organism  Diagnosis management comments: Patient is a 51-year-old male who presents with concern for cough, body aches and right-sided rib pain x3 days. He is afebrile, nontoxic in appearance, vital signs within appropriate limits. No signs of respiratory distress on exam, oxygen sats 100% on room air. Ambulatory oxygen 96% room air. 12:58 PM  Rapid covid negative. CXR shows: Mild infiltrative changes posterior medial right lung base. All results discussed with patient. Will dc with tessalon pearls, azithromycin and vantin for treatment of CAP. Discussed follow up as well as reasons to return to the ER. Pt verbalizes understanding and is agreeable to plan.           Procedures 90693 Comprehensive

## 2022-02-02 NOTE — ED ADULT NURSE NOTE - NSFALLRSKUNASSIST_ED_ALL_ED
Chart reviewed Call to Pt POA. Pt dgt would like the Pt go to [facility] or [facility]. LSW faxed a referral to [facility]. LSW is not sure if [facility] is taking COVID + Pt. no

## 2022-02-08 NOTE — ED ADULT NURSE NOTE - CINV DISCH TEACH PARTICIP
1  ATOPIC DERMATITIS ("childhood Eczema")    Assessment and Plan:  Based on a thorough discussion of this condition and the management approach to it (including a comprehensive discussion of the known risks, side effects and potential benefits of treatment), the patient (family) agrees to implement the following specific plan:   Keep diet the same (but monitor for any specific reactions after certain foods)   Avoid taking long baths with not using very hot water    Please continue to use Dove Sensitive Skin    After showering leave the skin damp and apply moisturizer   Use moisturizer like Eucerin,Cerave, Aquaphor, Vaseline, Vanicream or Aveeno Cream 2-3 times a day for the dry skin   Please use Hydrocortisone Ointment when activity flaring- apply topically 1-2 times daily until itching and redness has resolved          Assessment and Plan:   Atopic Dermatitis is a chronic, itchy skin condition that is very common in children but may occur at any age  It is also known as eczema or atopic eczema   It is the most common form of dermatitis  Atopic dermatitis usually occurs in people who have an atopic tendency    This means they may develop any or all of these closely linked conditions: Atopic dermatitis, asthma, hay fever (allergic rhinitis), eosinophilic esophagitis, and gastroenteritis  Often these conditions run within families with a parent, child or sibling also affected  A family history of asthma, eczema or hay fever is particularly useful in diagnosing atopic dermatitis in infants  Atopic dermatitis arises because of a complex interaction of genetic and environmental factors  These include defects in skin barrier function making the skin more susceptible to irritation by soap and other contact irritants, the weather, temperature and non-specific triggers  There is also an element of immune system dysregulation that is often present    By definition, it is chronic and has a "waxing-waning" nature; flares should be expected but with good education and treatment strategies can be minimized  Some specific tips we discussed:   Dry skin care   Using only mild cleansers (hypoallergenic and without fragrances) and fragrance free detergent (not unscented products which contain a masking agent); we discussed avoiding irritants/fragranced products   The importance of regular application of moisturizers daily (at least 3 times a day)   The known and theoretical side effects of steroids at length, including but not limited to atrophy of skin and increased pressure in eye (glaucoma) and clouding of the eye's lens (cataracts) if used in or around the eye for extended durations   The specific over-the-counter interventions and medications   Side effects, risks and benefits of topical and oral medications discussed   After lengthy discussion of etiology and treatment options, we decided to implement the following personalized treatment plan:      EDUCATION AS INTERVENTION! WHAT IS ATOPIC DERMATITIS? Atopic dermatitis (also called eczema) is a condition of the skin where the skin is dry, red, and itchy  The main function of the skin is to provide a barrier from the environment and is also the first defense of the immune system  In atopic dermatitis the skin barrier is decreased or disrupted, and the skin is easily irritated  As a result, moisture escapes the skin more easily, and environmental allergens and microbes can enter the skin more easily  Consequently, the skin's immune system is altered  If there are increased allergic type cells in the skin, the skin may become red and hyper-excitable   This leads to itching and a subsequent rash  WHY DO PEOPLE GET ATOPIC DERMATITIS? There is no single answer because many factors are involved  It is likely a combination of genetic makeup and environmental triggers and/or exposures   Excessive drying or sweating of the skin, Irritating soaps, dust mites, and pet dander are some of the more common triggers  There is no blood test that can be done to confirm this diagnosis  The history and appearance of the skin is usually sufficient for a diagnosis  However, in some cases if the rash does not fit with the history or respond appropriately to treatment, a skin biopsy may be helpful  Many children do outgrow atopic dermatitis or get better; however, many continue to have sensitive skin into adulthood  Asthma and hay fever are often seen in many patients with atopic dermatitis; however, asthma flares do not necessarily occur at the same time as skin flares  PREVENTING FLARES OF ATOPIC DERMATITIS  The first step is to maintain the skin's barrier function  Keep the skin well moisturized  Avoid irritants and triggers  Use prescribed medicine when there are red or rough areas to help the skin to return to normal as quickly as possible  Try to limit scratching  If you keep the skin well moisturized, and avoid coming in contact with things you know irritate your child's skin, there will be less flares  However, some flares of atopic dermatitis are beyond your control  You should work with your health care provider to come up with a plan that minimizes flares while minimizing long term use of medications that suppress the immune system  WHAT ARE SOME OF THE TRIGGERS? Triggers are different for different people  The most common triggers are:   Heat and sweat for some individuals, cold weather for others   House dust mites, pet fur   Wool; synthetic fabrics like nylon; dyed fabrics   Tobacco smoke    Fragrances in: shampoos, soaps, lotions, laundry detergents, fabric softeners   Saliva or prolonged exposure to water  WHAT ABOUT FOOD ALLERGIES? This is a very controversial topic, as many believe that food allergies are responsible for skin flares   In some cases, specific foods may cause worsening of atopic dermatitis; however this occurs in a minority of cases and usually happens within a few hours of ingestion  While food allergy is more common in children with eczema, foods are specific triggers for flares in only a small percentage of children  If you notice that the skin flares after certain foods you can see if eliminating one food at time makes a difference, as long as your child can still enjoy a well-balanced diet  There are blood (RAST) and skin (PRICK) tests that can check for allergies, but they are often positive in children who are not truly allergic  Therefore it is important that you work with your allergist and dermatologist to determine which foods are relevant and causing true symptoms  Extreme food elimination diets without the guidance of your doctor, which have become more popular in recent years, may even result in worsening of the skin rash due to malnutrition and avoidance of essential nutrients  TREATMENT  Treatments are aimed at minimizing exposure to irritating factors and decreasing  the skin inflammation which results in an itchy rash  There are many different treatment options, which depend on your child's rash, its location, and severity  Topical treatments include corticosteroids and steroid-like creams such as Protopic, Elidel, and Eucrisa, which are believed to not thin the skin  Please read the discussions below regarding risks and benefits of all of these creams  Occasionally bacterial or viral infections can occur which flare the skin and require oral and/or topical antibiotics or antivirals  In some cases bleach baths 2-3 times weekly can be helpful to prevent recurrent infection  For severe disease, strong oral medications such as corticosteroids, methotrexate or azathioprine (Imuran) may be needed  These medications require close monitoring and follow-up   You should discuss the risks/ benefits/alternatives of these medications with your health care provider to come up with the best treatment plan for your child  1) Use moisturizer all over the entire body at least THREE TIMES a day  This keeps the skin moisturized to restore the barrier function  Find a cream or ointment that your child likes - this is the most important  The medicines do not work in the bottle  The thicker the moisturizer, generally the better barrier it provides  Ointments often moisturize better than creams; and creams work better than lotions  Lotions are more useful during the summer when thick greasy ointments are uncomfortable  If you put moisturizer on the skin after bathing, while the skin is damp, it is twice as effective  The moisturizer provides a seal holding the water in the skin  You may bathe your child in warm - not hot - water, for short periods of time (no more than 5-10 minutes at a time) once a day if they like  Lightly pat your child dry with a towel and, while the skin is still damp, (within 3 minutes) apply a moisturizer from head to toe  If your child is using a medicated cream, apply it and allow it to absorb completely BEFORE you apply the moisturizer  2) Apply the prescription medication TWICE A DAY to only the red, rough areas on the skin OR AS [place]  Put the medication on your fingers and gently rub it into the areas  Usually the medicine will help an area within a few days time  Try to put the medicine on for two days after you have noticed that the redness is no longer present; this will help the redness from returning  The severity of the rash and the strength and usage of the medication will determine how quickly you see improvement  It is important that you do not overuse steroid creams, and if you notice a thin, shiny appearance to the skin or broken blood vessels, you should stop using the cream and consult your health care provider regarding possible overuse/overthinning of the skin    The face, armpits and groin have particularly thin and sensitive skin and are therefore most at risk for bad results if steroids are over-used in these sites  3) Avoid triggers  Some children have specific things that trigger itching and rashes, while others may have none that can be identified  It may require a little bit of trial and error to see what applies to your child  Also, triggers can change over time for your child  The most common triggers are listed above; start with these  Avoid the use of fabric softeners in the washing machine or dryer sheets (unless they are fragrance-free)  Try to use laundry detergents, soaps and shampoos that are fragrance-free  You may find it helpful to double-rinse your clothes  Some children are sensitive to house dust mites and they may benefit from a plastic mattress wrap  While food allergy is more common in children with eczema, foods are specific triggers for flares in only a small percentage of children  If you notice that the skin flares after certain foods you can see if eliminating one food at time makes a difference, as long as your child can still enjoy a well-balanced diet  4) Consider using a medication like an anti-histamine by mouth to help control the itching  Scratching only makes the skin more reactive and the barrier function even more disrupted  It can cause both children and their parents to lose sleep! There are different types of anti-itch medications  Some cause more drowsiness than others  Both types are acceptable depending on your child and your preference  Start with Benadryl and if that does not work, ask for a prescription antihistamine      5) About the prescription creams:  Corticosteroid creams and ointments (generally things with "-one" or "fitz" on the end of their names): The strength of the cream or ointment depends on the name of the active ingredient  The numbers at the end do not indicate the relative strength    Thus triamcinolone 0 1% ointment, considered a mid-strength corticosteroid, is much stronger than hydrocortisone 1% even though the number following the name is much lower  Topical corticosteroids are very effective in treating atopic dermatitis  When used in the manner prescribed (to rashy areas of skin and for no more than a few weeks at a time to any one area) they are very safe  These are corticosteroids and are anti-inflammatory, not the anabolic steroids like those used illegally by some athletes  Topical non-steroid creams and ointments (immunomodulators): These creams and ointments are also called topical calcineurin inhibitors (TCIs)  These include Protopic ointment and Elidel cream  Crisaborole 2% Renetta Motley) is a prescription ointment that targets an enzyme called PDE4 (phosphodiesterase 4)  It is used on the skin topically to treat mild-to-moderate eczema in adults and children 3years of age and older  In total, these nonsteroidal prescriptions are used to help decrease itching and redness in the skin  They are not as strong as most steroid creams; however, it is believed that they do not thin the skin when overused  They are generally used as second-line medications, though they may be used alone or in conjunction with topical steroids  In sensitive areas such as the face, underarms or groin, they are often recommended  They can sting inflamed skin, but are generally well tolerated once the skin is healing  The FDA placed a black-box warning on both Elidel and Protopic in 2006 based on animal studies using the medications  Some animals developed skin cancer and lymphoma  Subsequently, the FDA released a statement that there is no causal relationship between the two medications and cancer  Because of this concern, there are ongoing studies to evaluate this relationship in humans  So far, there are studies that support the safety of these medications    One showed that the rates of cancer in patient using these medications topically were less than the rates of the general population and another showed that in patient's using the medication over a large area of the body, the levels of the medication in the blood was undetectable  As for Eucrisa, this product is only approved for the topical treatment of mild-to-moderate eczema in patients 3years of age and older; use of the medication in kids younger than 2 is considered off label and has not been formally studied  Burning and stinging are the most commonly reported side effects of this medication  Rarely, this product has been known to cause hives and hypersensitivity reactions; discontinue its use if you develop severe itching, swelling, or redness in the area of application  Patient

## 2022-04-05 NOTE — PATIENT PROFILE ADULT. - CENTRAL VENOUS CATHETER
Writer called patient to follow up on referrals for Neurology, Podiatry, Pulmonary Medicine, and Urology. While on the phone patient asked that a message be sent requesting a referral for Gastroenterology. Patient states she was supposed to have her stomach looked at a year or two ago but the provider wouldn't do any procedures on her because of her aneurysm. Patient would like to see once since she is now being seen for her aneurysm. Please place referral and call patient once entered.       Thank you    no

## 2022-04-12 ENCOUNTER — APPOINTMENT (OUTPATIENT)
Dept: CT IMAGING | Facility: CLINIC | Age: 57
End: 2022-04-12
Payer: COMMERCIAL

## 2022-04-12 ENCOUNTER — OUTPATIENT (OUTPATIENT)
Dept: OUTPATIENT SERVICES | Facility: HOSPITAL | Age: 57
LOS: 1 days | End: 2022-04-12
Payer: COMMERCIAL

## 2022-04-12 DIAGNOSIS — R10.9 UNSPECIFIED ABDOMINAL PAIN: ICD-10-CM

## 2022-04-12 PROCEDURE — 74177 CT ABD & PELVIS W/CONTRAST: CPT | Mod: 26

## 2022-04-12 PROCEDURE — 74177 CT ABD & PELVIS W/CONTRAST: CPT

## 2022-04-13 ENCOUNTER — APPOINTMENT (OUTPATIENT)
Dept: ORTHOPEDIC SURGERY | Facility: CLINIC | Age: 57
End: 2022-04-13
Payer: COMMERCIAL

## 2022-04-13 VITALS — HEIGHT: 69 IN | BODY MASS INDEX: 30.36 KG/M2 | WEIGHT: 205 LBS

## 2022-04-13 DIAGNOSIS — Z78.9 OTHER SPECIFIED HEALTH STATUS: ICD-10-CM

## 2022-04-13 DIAGNOSIS — I10 ESSENTIAL (PRIMARY) HYPERTENSION: ICD-10-CM

## 2022-04-13 DIAGNOSIS — J45.909 UNSPECIFIED ASTHMA, UNCOMPLICATED: ICD-10-CM

## 2022-04-13 PROCEDURE — 20526 THER INJECTION CARP TUNNEL: CPT | Mod: 50

## 2022-04-13 PROCEDURE — 99214 OFFICE O/P EST MOD 30 MIN: CPT | Mod: 25

## 2022-04-13 RX ORDER — DEXLANSOPRAZOLE 60 MG/1
60 CAPSULE, DELAYED RELEASE ORAL
Refills: 0 | Status: ACTIVE | COMMUNITY

## 2022-04-13 RX ORDER — AMLODIPINE AND ATORVASTATIN 2.5; 4 MG/1; MG/1
TABLET, COATED ORAL
Refills: 0 | Status: ACTIVE | COMMUNITY

## 2022-04-13 NOTE — HISTORY OF PRESENT ILLNESS
[Gradual] : gradual [10] : 10 [Tightness] : tightness [Tingling] : tingling [Occasional] : occasional [Sleep] : sleep [Lying in bed] : lying in bed [de-identified] : 4/13/22:  Pt has constant n/t in b/l hands\par \par EMG: \par severe b/l carpal tunnel\par mild right cubital tunnel\par mild b/l C5 and C6 radiculopathy [] : no [FreeTextEntry1] : b/l hands  [FreeTextEntry3] : last week [FreeTextEntry5] : no injury, patient feels that he has been feeling pain, swelling and numbness in both hands. Left side is worse than the right  [de-identified] : l

## 2022-04-13 NOTE — DISCUSSION/SUMMARY
[Medication Risks Reviewed] : Medication risks reviewed [Surgical risks reviewed] : Surgical risks reviewed

## 2022-04-13 NOTE — PHYSICAL EXAM
[de-identified] : right hand no swelling or deformity\par +thenar atrophy\par full actve range of motion\par decreased sensation to the median nerve\par intact ulnar and radial sensation\par ain/pin/ulnar motor intact\par +tinels, phalens and durkans, negative spurling sign\par palpable pulses with CR<2s\par full motion to the elbow, shoulder\par \par left hand no swelling or deformity\par +thenar atrophy\par full actve range of motion\par decreased sensation to the median nerve\par intact ulnar and radial sensation\par ain/pin/ulnar motor intact\par +tinels, phalens and durkans, negative spurling sign\par palpable pulses with CR<2s\par full motion to the elbow, shoulder\par \par right elbow no deformity\par full active range of motion\par no tenderness to palpation\par +tinels at the cubital tunnel\par +elbow flexion/compression test\par decreased sensation in the ulnar nerve\par +instrinsic weakness 4/5\par median and radial sensation intact\par ain/pin motor intact\par palpable pulses with CR<2\par full active motion of the shoulder and hand\par \par left thumb base with shoulder deformity at the cm[facility].  TTP and positive basal grind at the [facility].\par median/ulnar/radial serve sensation intact\par ain/pin/ulnar motor intact\par palpable pulsres\par CR<2s\par full active range of motion otherwise\par \par \par

## 2022-04-13 NOTE — PROCEDURE
[Carpal Tunnel] : carpal tunnel [Bilateral] : bilaterally of the [Pain] : pain [Inflammation] : inflammation [Alcohol] : alcohol [Sterile technique used] : sterile technique used [Ethyl Chloride sprayed topically] : ethyl chloride sprayed topically [___ cc    6mg] :  Betamethasone (Celestone) ~Vcc of 6mg [___ cc    1%] : Lidocaine ~Vcc of 1%

## 2022-04-18 ENCOUNTER — APPOINTMENT (OUTPATIENT)
Dept: ORTHOPEDIC SURGERY | Facility: CLINIC | Age: 57
End: 2022-04-18
Payer: COMMERCIAL

## 2022-04-18 VITALS — WEIGHT: 205 LBS | BODY MASS INDEX: 30.36 KG/M2 | HEIGHT: 69 IN

## 2022-04-18 PROCEDURE — 99213 OFFICE O/P EST LOW 20 MIN: CPT | Mod: 25

## 2022-04-18 PROCEDURE — 20611 DRAIN/INJ JOINT/BURSA W/US: CPT | Mod: LT

## 2022-04-18 PROCEDURE — J3490M: CUSTOM

## 2022-04-19 NOTE — PHYSICAL EXAM
[Bilateral] : knee bilaterally [5___] : hamstring 5[unfilled]/5 [] : patient ambulates without assistive device

## 2022-04-19 NOTE — HISTORY OF PRESENT ILLNESS
[de-identified] : Follow up on both knees today. Had Visco-3 injections in both back in February.  Would like to know what the next process would be now since the pain has been on going. Also had a Zilretta injection in both in December but had minimal relief with that as well.

## 2022-04-19 NOTE — PROCEDURE
[FreeTextEntry3] : Large joint injection was performed of the left. The indication for this procedure was pain, inflammation and x-ray evidence of Osteoarthritis on this or prior visit. The site was prepped with betadine, ethyl chloride sprayed topically and sterile technique used. An injection of Zilretta cc of 32 unites 5mg was used.\par \par Patient was advised to call if redness, pain or fever occur and apply ice for 15 minutes out of every hour for the next 12-24 hours as tolerated.\par \par Patient has tried OTC's including aspirin, Ibuprofen, Aleve etc, or prescription NSAIDS, and/or exercises at home and/or physical therapy without satisfactory response, patient had decreased mobility in the joint and the risks, benefits and alternatives have been discussed, and verbal consent was obtained. Ultrasound guidance was indicated for this patient due to precise injection in the area of tear. All ultrasound images have been permanently captured and stored accordingly in our picture archiving and communication system. Visualization of the needle and placement of injection was performed without complication.\par Large joint injection was performed of the right. The indication for this procedure was pain, inflammation and x-ray evidence of Osteoarthritis on this or prior visit. The site was prepped with betadine, ethyl chloride sprayed topically and sterile technique used. An injection of Zilretta cc of 32 unites 5mg was used.\par \par Patient was advised to call if redness, pain or fever occur and apply ice for 15 minutes out of every hour for the next 12-24 hours as tolerated.\par \par Patient has tried OTC's including aspirin, Ibuprofen, Aleve etc, or prescription NSAIDS, and/or exercises at home and/or physical therapy without satisfactory response, patient had decreased mobility in the joint and the risks, benefits and alternatives have been discussed, and verbal consent was obtained. Ultrasound guidance was indicated for this patient due to precise injection in the area of tear. All ultrasound images have been permanently captured and stored accordingly in our picture archiving and communication system. Visualization of the needle and placement of injection was performed without complication.\par \par \par

## 2022-04-19 NOTE — DISCUSSION/SUMMARY
[Medication Risks Reviewed] : Medication risks reviewed [Surgical risks reviewed] : Surgical risks reviewed [de-identified] : had an extensive discussion regarding risks of knee replacement and morbidity, wants to hold off if possible, wants to try ziletta, discussed risks on injection\par discussed risks of surgical options and other treatments, surgery not helpful right now

## 2022-04-22 ENCOUNTER — TRANSCRIPTION ENCOUNTER (OUTPATIENT)
Age: 57
End: 2022-04-22

## 2022-04-25 ENCOUNTER — APPOINTMENT (OUTPATIENT)
Dept: PAIN MANAGEMENT | Facility: CLINIC | Age: 57
End: 2022-04-25
Payer: COMMERCIAL

## 2022-04-25 VITALS — BODY MASS INDEX: 29.62 KG/M2 | WEIGHT: 200 LBS | HEIGHT: 69 IN

## 2022-04-25 PROCEDURE — 99214 OFFICE O/P EST MOD 30 MIN: CPT | Mod: 25

## 2022-04-25 PROCEDURE — J3490M: CUSTOM

## 2022-04-25 PROCEDURE — 20552 NJX 1/MLT TRIGGER POINT 1/2: CPT

## 2022-04-25 NOTE — PROCEDURE
[Trigger point 1-2 muscle groups] : trigger point 1-2 muscle groups [Lumbar paraspinal muscle] : lumbar paraspinal muscle [Cervical paraspinal muscle] : cervical paraspinal muscle [Other: ____] : [unfilled]

## 2022-04-25 NOTE — HISTORY OF PRESENT ILLNESS
[Neck] : neck [Lower back] : lower back [10] : 10 [8] : 8 [Dull/Aching] : dull/aching [Tingling] : tingling [Constant] : constant [Household chores] : household chores [Leisure] : leisure [Work] : work [Sleep] : sleep [Massage] : massage [Lying in bed] : lying in bed [] : no [FreeTextEntry7] : b/l hands

## 2022-04-25 NOTE — ASSESSMENT
[FreeTextEntry1] : c/o pain in lower back R side\par neck pain is generalized with radiation into BL UE and hand\par \par prior LESI and MARITZA with 70% relief improved ROM and ADLS\par will repeat MARITZA and plan repeat LESI after updated MRI LS spine obtained \par \par \par \par \par Patient has tried OTC's including aspirin, Ibuprofen, Aleve etc or prescription NSAIDS, and/or exercises at home and/ or physical therapy without satisfactory response After verbal consent using sterile preparation and technique.The risks, benefits, and alternatives to cortisone injection were explained in full to the patient. Risks outlined include but are not limited to infection, sepsis, bleeding, scarring, skin discoloration, temporary increase in pain, syncopal episode, failure to resolve symptoms, allergic reaction, symptom recurrence, and elevation of blood sugar in diabetics. Patient understood the risks. All questions were answered. After discussion of options, patient requested an injection. Oral informed consent was obtained and sterile prep was done of the injection site. Sterile technique was utilized for the procedure including the preparation of the solutions used for the injection. Patient tolerated the procedure well. Advised to ice the injection site this evening.  Sterile technique used Prep with alcohol locally to site.\par

## 2022-04-27 ENCOUNTER — APPOINTMENT (OUTPATIENT)
Dept: PAIN MANAGEMENT | Facility: CLINIC | Age: 57
End: 2022-04-27
Payer: COMMERCIAL

## 2022-04-27 PROCEDURE — 62321 NJX INTERLAMINAR CRV/THRC: CPT

## 2022-04-27 NOTE — PROCEDURE
[FreeTextEntry3] : Date of Service: 04/27/2022 \par \par Account: [id number]\par \par Patient: SVITLANA CAMPBELL \par \par YOB: 1965\par \par Age: 56 year\par \par \par Surgeon: Rosales Roca M.D.\par \par Pre-Operative Diagnosis: Cervical Radiculopathy\par \par Post Operative Diagnosis: Cervical Radiculopathy\par \par Procedure: Interlaminar cervical epidural steroid injection (C7-T1) under fluoroscopic guidance \par \par Anesthesia: MAC\par \par \par This procedure was carried out using fluoroscopic guidance.  The risks and benefits of the procedure were discussed extensively with the patient.  The consent of the patient was obtained and the following procedure was performed.\par \par The patient was placed in the prone position using a thoracic and chin support.  The cervical area was prepped and draped in a sterile fashion.  The fluoroscope visualized the C7-T1 interspace using slight cephalad-caudad angulation and this area was marked.  Using sterile technique the superficial skin was anesthetized with 1% Lidocaine without epinephrine.  A 18 gauge Tuohoy needle was advanced under fluoroscopy using [id number]-ivhpy technique until ligament was engaged.  The stilette was then removed and a column of preservative free normal saline flushed through the tuohoy needle and left with a concave fluid level above the butterfly portion of the tuohoy needle.  The needle was then advanced under fluoroscopic guidance until the column of saline disappeared.  Lateral view confirmed final needle tip placement in the epidural space.  After negative aspiration for heme and CSF, 1 cc of omnipaque confirmed good cervical epiduragram.  \par \par Cervical epidurogram showed no evidence of intrathecal or intravascular flow, and good bilateral epidural flow from C3 to T2 levels.  \par \par An injectate of 3cc of preservative free normal saline plus 12 mg of betamethasone was then injected into the epidural space. The needle was subsequently removed and pressure was applied.\par \par Anesthesia personnel were present throughout the procedure.  The patient tolerated the procedure well and was instructed to contact me immediately if there were any problems.\par \par \par Rosales Roca M.D.\par

## 2022-04-28 ENCOUNTER — APPOINTMENT (OUTPATIENT)
Dept: MRI IMAGING | Facility: CLINIC | Age: 57
End: 2022-04-28
Payer: COMMERCIAL

## 2022-04-28 PROCEDURE — 72148 MRI LUMBAR SPINE W/O DYE: CPT

## 2022-05-26 ENCOUNTER — APPOINTMENT (OUTPATIENT)
Dept: ORTHOPEDIC SURGERY | Facility: CLINIC | Age: 57
End: 2022-05-26
Payer: COMMERCIAL

## 2022-05-26 ENCOUNTER — APPOINTMENT (OUTPATIENT)
Dept: PAIN MANAGEMENT | Facility: CLINIC | Age: 57
End: 2022-05-26

## 2022-05-26 VITALS — HEIGHT: 69 IN | WEIGHT: 205 LBS | BODY MASS INDEX: 30.36 KG/M2

## 2022-05-26 PROCEDURE — 20550 NJX 1 TENDON SHEATH/LIGAMENT: CPT

## 2022-05-26 PROCEDURE — 20526 THER INJECTION CARP TUNNEL: CPT | Mod: 50

## 2022-05-26 PROCEDURE — J3490M: CUSTOM

## 2022-05-26 PROCEDURE — 99214 OFFICE O/P EST MOD 30 MIN: CPT | Mod: 25

## 2022-05-31 NOTE — PHYSICAL EXAM
[de-identified] : R elbow:\par Tender at the ulna nerve\par Pain with flexion\par +tinels at the ulna nerve\par +hyperflexion test\par \par R hand:\par +lumbrical atrophy\par Intrinsic weakness\par Decreased sensation in the ulna nerve distribution\par Tender volar wrist \par Good finger ROM \par +Tinels \par +Phalens \par +Compression test \par Decreased sensation median nerve distribution\par \par L hand: \par +thenar atrophy \par Tender volar wrist \par Good finger ROM \par +Tinels \par +Phalens \par +Compression test \par Decreased sensation median nerve distribution\par \par

## 2022-05-31 NOTE — DISCUSSION/SUMMARY
[de-identified] : Bilateral Carpal tunnel  and CuT injection was performed because of pain inflammation\par Anesthesia: ethyl chloride sprayed topically\par Celestone: An injection of Celestone 1cc\par Lidocaine: An injection of Lidocaine 1% 1cc\par Marcaine: An injection of Marcaine 0.5% 1cc\par x3\par \par Patient has tried OTC's including aspirin, Ibuprofen, Aleve etc or prescription NSAIDS, and/or exercises at home and/ or\par physical therapy without satisfactory response.\par After verbal consent using sterile preparation and technique. The risks, benefits, and alternatives to cortisone injection\par were explained in full to the patient. Risks outlined include but are not limited to infection, sepsis, bleeding, scarring, skin\par discoloration, temporary increase in pain, syncopal episode, failure to resolve symptoms, allergic reaction, symptom\par recurrence, and elevation of blood sugar in diabetics. Patient understood the risks. All questions were answered. After\par discussion of options, patient requested an injection. Oral informed consent was obtained and sterile prep was done of the\par injection site. Sterile technique was utilized for the procedure including the preparation of the solutions used for the\par injection. Patient tolerated the procedure well. Advised to ice the injection site this evening.\par Prep with betadine locally to site. Sterile technique used

## 2022-05-31 NOTE — HISTORY OF PRESENT ILLNESS
[Gradual] : gradual [10] : 10 [7] : 7 [Burning] : burning [Dull/Aching] : dull/aching [Tingling] : tingling [de-identified] : He has bilateral hand numbness\par \par Getting worse [] : no [FreeTextEntry1] : B/L Wrists  [FreeTextEntry3] : few years  [FreeTextEntry5] : patient states he is having discomfort , numbness and tingling, No Injury  [FreeTextEntry6] : numbness  [de-identified] : activity  [de-identified] : 2021 [de-identified] : Dr Hair  [de-identified] : [facility]

## 2022-05-31 NOTE — REASON FOR VISIT
[FreeTextEntry2] : f/u to injection\par Procedure: Interlaminar cervical epidural steroid injection(C7-T1) under fluoroscopic guidance \par Anesthesia:MAC ( 04/27/2022 )

## 2022-06-01 ENCOUNTER — APPOINTMENT (OUTPATIENT)
Dept: NEUROLOGY | Facility: CLINIC | Age: 57
End: 2022-06-01
Payer: COMMERCIAL

## 2022-06-01 DIAGNOSIS — R20.0 ANESTHESIA OF SKIN: ICD-10-CM

## 2022-06-01 DIAGNOSIS — G56.03 CARPAL TUNNEL SYNDROM,BILATERAL UPPER LIMBS: ICD-10-CM

## 2022-06-01 DIAGNOSIS — R20.2 ANESTHESIA OF SKIN: ICD-10-CM

## 2022-06-01 PROCEDURE — 95886 MUSC TEST DONE W/N TEST COMP: CPT

## 2022-06-01 PROCEDURE — 95912 NRV CNDJ TEST 11-12 STUDIES: CPT

## 2022-06-07 ENCOUNTER — APPOINTMENT (OUTPATIENT)
Dept: ORTHOPEDIC SURGERY | Facility: CLINIC | Age: 57
End: 2022-06-07
Payer: COMMERCIAL

## 2022-06-07 VITALS — BODY MASS INDEX: 30.36 KG/M2 | WEIGHT: 205 LBS | HEIGHT: 69 IN

## 2022-06-07 PROCEDURE — 99214 OFFICE O/P EST MOD 30 MIN: CPT | Mod: 25,57

## 2022-06-07 PROCEDURE — J3490M: CUSTOM

## 2022-06-07 PROCEDURE — 20600 DRAIN/INJ JOINT/BURSA W/O US: CPT

## 2022-06-07 PROCEDURE — 73130 X-RAY EXAM OF HAND: CPT | Mod: LT

## 2022-06-07 NOTE — PHYSICAL EXAM
[de-identified] : R elbow:\par Tender at the ulna nerve\par Pain with flexion\par +tinels at the ulna nerve\par +hyperflexion test\par \par R hand:\par +lumbrical atrophy\par Intrinsic weakness\par Decreased sensation in the ulna nerve distribution\par Tender volar wrist \par Good finger ROM \par +Tinels \par +Phalens \par +Compression test \par Decreased sensation median nerve distribution\par \par L hand: \par +thenar atrophy \par Tender volar wrist \par Good finger ROM \par +Tinels \par +Phalens \par +Compression test \par Decreased sensation median nerve distribution\par +THmb CMC tender, swelling\par \par Xrays OA\par \par

## 2022-06-07 NOTE — DISCUSSION/SUMMARY
[de-identified] : For R ulna nerve decomp/transp at the elbow and R CTR 1st, then L CTR\par R/B/A of surgery discussed with the patient. Risks including but not limited to infection, nerve damage, tendon damage, pain, stiffness, recurrence, no resolution of symptoms, loss of function, limb or life. They understand and agree to surgery \par Return post op

## 2022-06-07 NOTE — HISTORY OF PRESENT ILLNESS
[6] : 6 [4] : 4 [Tingling] : tingling [de-identified] : EMG shows R CuTS, CTS; L CTS; cerv radic\par \par ALso L thumb CMC pain  [FreeTextEntry1] : L hand [de-identified] : none

## 2022-06-17 ENCOUNTER — APPOINTMENT (OUTPATIENT)
Dept: ORTHOPEDIC SURGERY | Facility: CLINIC | Age: 57
End: 2022-06-17
Payer: COMMERCIAL

## 2022-06-17 VITALS — BODY MASS INDEX: 30.36 KG/M2 | HEIGHT: 69 IN | WEIGHT: 205 LBS

## 2022-06-17 DIAGNOSIS — M50.20 OTHER CERVICAL DISC DISPLACEMENT, UNSPECIFIED CERVICAL REGION: ICD-10-CM

## 2022-06-17 DIAGNOSIS — M48.02 SPINAL STENOSIS, CERVICAL REGION: ICD-10-CM

## 2022-06-17 PROCEDURE — 72050 X-RAY EXAM NECK SPINE 4/5VWS: CPT

## 2022-06-17 PROCEDURE — 99215 OFFICE O/P EST HI 40 MIN: CPT

## 2022-06-17 NOTE — PHYSICAL EXAM
[Extension] : extension [Rotation to left] : rotation to left [Rotation to right] : rotation to right [] : positive Spurling [de-identified] : p
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
## 2022-06-17 NOTE — DISCUSSION/SUMMARY
[de-identified] : reviewed the imaging and the case with the patient \par \par The patient has tried conservative treatment for this condition including time/activity modification/various medications/therapy/injections without significant relief.  Do not suspect that further conservative treatment will lead to a resolution of symptoms.  Patient remains with persistent activity limited symptoms and is therefore indicated for surgical intervention \par \par I have indicated the patient for an Anterior Cervical Discectomy & Fusion C4-7 \par \par We've discussed the surgery details including instrumentation and grafting options (local, allograft, ICBG, and biologics) as well as potential for complications including but not limited to pain, scar, loss of function, permenant injury, death, need for additional surgery, need for blood transfuion, prolonged hospitilization, prolonged recovery, lack of recovery, blood clots, pulmonary embolism, heart attack, stroke, or  infection. There is also a possibility for hardware complication such as malposition of hardware,hardware loosening, pullout, failure or fracture of bone, adjacent segmen tdisease, pseudarthrosis, and need for future surgery. Finally, we discussed potential for injury to nerves, spinal cord or blood vessels, paralysis, blindness, need for transfusion, general anesthesia, allergic reaction, prolonged intubation,myocardial infarction, stroke, deep venous thrombosis, pulmonary embolus, and death. The patient understands these things and all questions are answered tohis/her satisfaction.  The surgery may need to be paused or staged or not completed due to intraoperative events or at the surgeon's discretion.  Any injury that occurs may be permenate.  \par \par Spinal fluid leak may occur and may require prolonged time in the hospital or further surgical procedures \par \par Patient has been instructed to stop all Aspirin and NSAIDs 10 days prior to surgery date. For Coumadin and other blood thinners, the patient is referred to the medical doctor\par \par We discussed we will use neuromonitioring for the surgery in order to possibly migitate risks of injury. \par \par The procedure does not come with a guarantee of success or of satisfaction on the patient's behalf.\par \par At the surgeon's discretion he may call for assistance during the surgery or in the perioperative period \par \par we discussed the the surgery and the possible risks and downsides as well \par \par hes going to think about it

## 2022-06-29 ENCOUNTER — APPOINTMENT (OUTPATIENT)
Dept: PAIN MANAGEMENT | Facility: CLINIC | Age: 57
End: 2022-06-29

## 2022-06-29 VITALS — HEIGHT: 69 IN | WEIGHT: 205 LBS | BODY MASS INDEX: 30.36 KG/M2

## 2022-06-29 PROCEDURE — 20552 NJX 1/MLT TRIGGER POINT 1/2: CPT

## 2022-06-29 PROCEDURE — 99214 OFFICE O/P EST MOD 30 MIN: CPT | Mod: 25

## 2022-06-29 PROCEDURE — J3490M: CUSTOM

## 2022-06-29 NOTE — ASSESSMENT
[FreeTextEntry1] : priro MARITZA and LESI . 50-70% relief improved rom and adls\par will repeat \par \par f/u PMD renal cysts noted on MRI

## 2022-06-29 NOTE — HISTORY OF PRESENT ILLNESS
[Lower back] : lower back [9] : 9 [10] : 10 [Radiating] : radiating [Throbbing] : throbbing [Constant] : constant [Household chores] : household chores [Leisure] : leisure [Sleep] : sleep [Massage] : massage [Bending forward] : bending forward [Lying in bed] : lying in bed [] : no [FreeTextEntry6] : HEADACHES [FreeTextEntry7] : head [FreeTextEntry9] : TPI

## 2022-07-06 ENCOUNTER — APPOINTMENT (OUTPATIENT)
Dept: PAIN MANAGEMENT | Facility: CLINIC | Age: 57
End: 2022-07-06

## 2022-07-06 PROCEDURE — 62323 NJX INTERLAMINAR LMBR/SAC: CPT

## 2022-07-07 NOTE — PROCEDURE
[FreeTextEntry3] : Date of Service: 07/06/2022 \par \par Account: [id number]\par \par Patient: SVITLANA CAMPBELL \par \par YOB: 1965\par \par Age: 56 year\par \par \par Surgeon:                                   Rosales Roca M.D.\par \par Pre-Operative Diagnosis:      Lumbosacral radiculitis                \par \par Post Operative Diagnosis:     Lumbosacral radiculitis                \par \par Procedure:                               Interlaminar lumbar epidural steroid injection (L5-S1) under fluoroscopic guidance\par \par Anesthesia:                              MAC\par \par \par This procedure was carried out using fluoroscopic guidance.  The risks and benefits of the procedure were discussed extensively with the patient. The consent of the patient was obtained and the following procedure was performed.\par \par The patient was placed in the prone position.  The lumbar area was prepped and draped in a sterile fashion.  Under AP view with slight cephalad-caudad angulation, the L5-S1 interspace was identified and marked.  Using sterile technique the superficial skin was anesthetized with 1% Lidocaine without epinephrine.  A 20 gauge Tuohoy needle was advanced into the epidural space under fluoroscopy using [id number]-eyyhwwsqz-ehlpm technique and using loss of resistance at the L5-S1 level.  After negative aspiration for heme or CSF, an epidurogram was obtained using 3 cc Omnipaque contrast confirming epidural placement of the needle.  \par \par Epidurogram showed no evidence of intrathecal or intravascular flow, and good evidence of bilateral epidural flow from L3-S2 levels.  After this, 5 cc of preservative free normal saline and 80 mg of kenalog were injected into the epidural space.\par \par The needle was subsequently removed.  Anesthesia personnel were present throughout the procedure.\par \par The patient tolerated the procedure well and was instructed to contact me immediately if there were any problems.\par \par \par Rosales Roca M.D.\par

## 2022-07-13 ENCOUNTER — APPOINTMENT (OUTPATIENT)
Age: 57
End: 2022-07-13

## 2022-07-20 ENCOUNTER — APPOINTMENT (OUTPATIENT)
Dept: PAIN MANAGEMENT | Facility: CLINIC | Age: 57
End: 2022-07-20

## 2022-07-20 PROCEDURE — 62321 NJX INTERLAMINAR CRV/THRC: CPT

## 2022-07-20 NOTE — PROCEDURE
[FreeTextEntry3] : Date of Service: 07/20/2022 \par \par Account: [id number]\par \par Patient: SVITLANA CAMPBELL \par \par YOB: 1965\par \par Age: 56 year\par \par \par Surgeon: Rosales Roca M.D.\par \par Pre-Operative Diagnosis: Cervical Radiculopathy\par \par Post Operative Diagnosis: Cervical Radiculopathy\par \par Procedure: Interlaminar cervical epidural steroid injection (C7-T1) under fluoroscopic guidance \par \par Anesthesia: MAC\par \par \par This procedure was carried out using fluoroscopic guidance.  The risks and benefits of the procedure were discussed extensively with the patient.  The consent of the patient was obtained and the following procedure was performed.\par \par The patient was placed in the prone position using a thoracic and chin support.  The cervical area was prepped and draped in a sterile fashion.  The fluoroscope visualized the C7-T1 interspace using slight cephalad-caudad angulation and this area was marked.  Using sterile technique the superficial skin was anesthetized with 1% Lidocaine without epinephrine.  A 18 gauge Tuohoy needle was advanced under fluoroscopy using jseqs-[id number]-wzskj technique until ligament was engaged.  The stilette was then removed and a column of preservative free normal saline flushed through the tuohoy needle and left with a concave fluid level above the butterfly portion of the tuohoy needle.  The needle was then advanced under fluoroscopic guidance until the column of saline disappeared.  Lateral view confirmed final needle tip placement in the epidural space.  After negative aspiration for heme and CSF, 1 cc of omnipaque confirmed good cervical epiduragram.  \par \par Cervical epidurogram showed no evidence of intrathecal or intravascular flow, and good bilateral epidural flow from C3 to T2 levels.  \par \par An injectate of 3cc of preservative free normal saline plus 12 mg of betamethasone was then injected into the epidural space. The needle was subsequently removed and pressure was applied.\par \par Anesthesia personnel were present throughout the procedure.  The patient tolerated the procedure well and was instructed to contact me immediately if there were any problems.\par \par \par Rosales Roca M.D.\par

## 2022-07-21 ENCOUNTER — APPOINTMENT (OUTPATIENT)
Dept: ORTHOPEDIC SURGERY | Facility: CLINIC | Age: 57
End: 2022-07-21

## 2022-07-27 ENCOUNTER — APPOINTMENT (OUTPATIENT)
Age: 57
End: 2022-07-27

## 2022-08-03 ENCOUNTER — APPOINTMENT (OUTPATIENT)
Dept: PAIN MANAGEMENT | Facility: CLINIC | Age: 57
End: 2022-08-03

## 2022-08-03 VITALS — HEIGHT: 69 IN | WEIGHT: 205 LBS | BODY MASS INDEX: 30.36 KG/M2

## 2022-08-03 PROCEDURE — 20553 NJX 1/MLT TRIGGER POINTS 3/>: CPT

## 2022-08-03 PROCEDURE — 99214 OFFICE O/P EST MOD 30 MIN: CPT | Mod: 25

## 2022-08-03 PROCEDURE — J3490M: CUSTOM

## 2022-08-03 NOTE — PROCEDURE
[Trigger point 1-2 muscle groups] : trigger point 1-2 muscle groups [Lumbar paraspinal muscle] : lumbar paraspinal muscle [Cervical paraspinal muscle] : cervical paraspinal muscle [Pain] : pain [Inflammation] : inflammation [Alcohol] : alcohol [Ethyl Chloride sprayed topically] : ethyl chloride sprayed topically [___ cc    1%] : Lidocaine ~Vcc of 1%  [___ cc    0.25%] : Bupivacaine (Marcaine) ~Vcc of 0.25%  [___ cc    10mg] : Triamcinolone (Kenalog) ~Vcc of 10 mg  [] : Patient tolerated procedure well [Call if redness, pain or fever occur] : call if redness, pain or fever occur [Risks, benefits, alternatives discussed / Verbal consent obtained] : the risks benefits, and alternatives have been discussed, and verbal consent was obtained

## 2022-08-03 NOTE — HISTORY OF PRESENT ILLNESS
[Neck] : neck [Nothing helps with pain getting better] : Nothing helps with pain getting better [Sitting] : sitting [Standing] : standing [Walking] : walking [Lower back] : lower back [9] : 9 [10] : 10 [Radiating] : radiating [Throbbing] : throbbing [Constant] : constant [Household chores] : household chores [Leisure] : leisure [Sleep] : sleep [Massage] : massage [Bending forward] : bending forward [Lying in bed] : lying in bed [de-identified] : pt is following up after epidural he states it did not help him  [] : no [FreeTextEntry6] : HEADACHES [FreeTextEntry7] : head [FreeTextEntry9] : TPI

## 2022-08-04 ENCOUNTER — APPOINTMENT (OUTPATIENT)
Dept: ORTHOPEDIC SURGERY | Facility: CLINIC | Age: 57
End: 2022-08-04

## 2022-08-08 ENCOUNTER — APPOINTMENT (OUTPATIENT)
Dept: ORTHOPEDIC SURGERY | Facility: CLINIC | Age: 57
End: 2022-08-08

## 2022-08-29 ENCOUNTER — APPOINTMENT (OUTPATIENT)
Dept: PAIN MANAGEMENT | Facility: CLINIC | Age: 57
End: 2022-08-29

## 2022-08-29 VITALS — WEIGHT: 205 LBS | BODY MASS INDEX: 30.36 KG/M2 | HEIGHT: 69 IN

## 2022-08-29 PROCEDURE — J3490M: CUSTOM

## 2022-08-29 PROCEDURE — 20552 NJX 1/MLT TRIGGER POINT 1/2: CPT

## 2022-08-29 PROCEDURE — 99214 OFFICE O/P EST MOD 30 MIN: CPT | Mod: 25

## 2022-08-29 NOTE — HISTORY OF PRESENT ILLNESS
[Neck] : neck [Upper back] : upper back [10] : 10 [Dull/Aching] : dull/aching [Constant] : constant [Household chores] : household chores [Leisure] : leisure [Work] : work [Sleep] : sleep [Meds] : meds [Bending forward] : bending forward [Lying in bed] : lying in bed [Lower back] : lower back [9] : 9 [Radiating] : radiating [Throbbing] : throbbing [Massage] : massage [Nothing helps with pain getting better] : Nothing helps with pain getting better [Sitting] : sitting [Standing] : standing [Walking] : walking [de-identified] : pt is following up after epidural he states it did not help him  [] : no [FreeTextEntry6] : HEADACHES [FreeTextEntry7] : head [FreeTextEntry9] : TPI

## 2022-08-29 NOTE — ASSESSMENT
[FreeTextEntry1] : The risks, benefits, contents and alternatives to injection were explained in full to the patient.  Risks outlined include but are not limited to infection, sepsis, bleeding, scarring, skin discoloration, temporary increase in pain, syncopal episode, failure to resolve symptoms, allergic reaction, flare reaction, permanent white skin discoloration, symptom recurrence, and elevation of blood sugar in diabetics.  Patient understood the risks.  All questions were answered.  After discussion of options, patient requested an injection.  Oral informed consent was obtained and sterile prep was done of the injection site.  Sterile technique was used to introduce the mixture. The mixture consisted of 3cc of 1% lidocaine and 40mg of kenalog.  The patient was placed in a sitting position with the cervical spine flexed.  The occipital artery is palpated at the level of the superior nuchal ridge.  A 25 gauge 1.5 inch needle is placed medial to artery until periosteum was contacted.  After gentle aspiration, 2cc of the mixture was injected in a fanlike distribution.  The lesser occipital nerve was then blocked by directing the needle laterally and slightly inferiorly, just medial to mastoid process.  After gentle aspiration, 2 cc of the mixture was injected.  Patient tolerated the procedure well.  Patient advised to ice the injection site this evening.  Signs and symptoms of infection reviewed and patient advised to call immediately for redness, fevers, and/or chills.\par

## 2022-09-02 ENCOUNTER — APPOINTMENT (OUTPATIENT)
Dept: ORTHOPEDIC SURGERY | Facility: CLINIC | Age: 57
End: 2022-09-02
Payer: COMMERCIAL

## 2022-09-02 PROCEDURE — 20550 NJX 1 TENDON SHEATH/LIGAMENT: CPT

## 2022-09-02 PROCEDURE — 99213 OFFICE O/P EST LOW 20 MIN: CPT | Mod: 25

## 2022-09-02 PROCEDURE — 20526 THER INJECTION CARP TUNNEL: CPT | Mod: 50

## 2022-09-02 PROCEDURE — J3490M: CUSTOM

## 2022-09-02 NOTE — DISCUSSION/SUMMARY
[de-identified] : Bilateral Carpal tunnel  and CuT injection was performed because of pain inflammation\par Anesthesia: ethyl chloride sprayed topically\par Celestone: An injection of Celestone 1cc\par Lidocaine: An injection of Lidocaine 1% 1cc\par Marcaine: An injection of Marcaine 0.5% 1cc\par x3\par \par Patient has tried OTC's including aspirin, Ibuprofen, Aleve etc or prescription NSAIDS, and/or exercises at home and/ or\par physical therapy without satisfactory response.\par After verbal consent using sterile preparation and technique. The risks, benefits, and alternatives to cortisone injection\par were explained in full to the patient. Risks outlined include but are not limited to infection, sepsis, bleeding, scarring, skin\par discoloration, temporary increase in pain, syncopal episode, failure to resolve symptoms, allergic reaction, symptom\par recurrence, and elevation of blood sugar in diabetics. Patient understood the risks. All questions were answered. After\par discussion of options, patient requested an injection. Oral informed consent was obtained and sterile prep was done of the\par injection site. Sterile technique was utilized for the procedure including the preparation of the solutions used for the\par injection. Patient tolerated the procedure well. Advised to ice the injection site this evening.\par Prep with betadine locally to site. Sterile technique used

## 2022-09-02 NOTE — PHYSICAL EXAM
[de-identified] : R elbow:\par Tender at the ulna nerve\par Pain with flexion\par +tinels at the ulna nerve\par +hyperflexion test\par \par R hand:\par +lumbrical atrophy\par Intrinsic weakness\par Decreased sensation in the ulna nerve distribution\par Tender volar wrist \par Good finger ROM \par +Tinels \par +Phalens \par +Compression test \par Decreased sensation median nerve distribution\par \par L hand: \par +thenar atrophy \par Tender volar wrist \par Good finger ROM \par +Tinels \par +Phalens \par +Compression test \par Decreased sensation median nerve distribution\par +THmb CMC tender, swelling\par \par \par \par

## 2022-09-02 NOTE — HISTORY OF PRESENT ILLNESS
[Left Arm] : left arm [Right Arm] : right arm [Gradual] : gradual [Sudden] : sudden [6] : 6 [5] : 5 [Burning] : burning [Shooting] : shooting [Stabbing] : stabbing [Intermittent] : intermittent [] : Post Surgical Visit: no [FreeTextEntry1] : B/L hands

## 2022-10-10 ENCOUNTER — APPOINTMENT (OUTPATIENT)
Dept: ORTHOPEDIC SURGERY | Facility: CLINIC | Age: 57
End: 2022-10-10

## 2022-10-10 VITALS — HEIGHT: 69 IN | WEIGHT: 205 LBS | BODY MASS INDEX: 30.36 KG/M2

## 2022-10-10 PROCEDURE — 20611 DRAIN/INJ JOINT/BURSA W/US: CPT | Mod: 50

## 2022-10-10 PROCEDURE — J3490M: CUSTOM

## 2022-10-10 PROCEDURE — 99214 OFFICE O/P EST MOD 30 MIN: CPT | Mod: 25

## 2022-10-11 NOTE — HISTORY OF PRESENT ILLNESS
[de-identified] : patient is here for a follow up Visit of bill hips. pt had zilretta injection in bill knees  and it helped   with the pain, pt started feeling pain again about a month ago. pt feels  a burning sensation in bill knees. pt mostly feels the pain when putting pressure or standing up in bill  knees

## 2022-10-11 NOTE — PROCEDURE
[Large Joint Injection] : Large joint injection [Bilateral] : bilaterally of the [Knee] : knee [Inflammation] : inflammation [X-ray evidence of Osteoarthritis on this or prior visit] : x-ray evidence of Osteoarthritis on this or prior visit [Betadine] : betadine [Ethyl Chloride sprayed topically] : ethyl chloride sprayed topically [Sterile technique used] : sterile technique used [___ cc    0.25%] : Bupivacaine (Marcaine) ~Vcc of 0.25%  [___ cc    32 units 5mg] : Zilretta ~Vcc of 32 units 5 mg  [] : Patient tolerated procedure well [Call if redness, pain or fever occur] : call if redness, pain or fever occur [Apply ice for 15min out of every hour for the next 12-24 hours as tolerated] : apply ice for 15 minutes out of every hour for the next 12-24 hours as tolerated [Previous OTC use and PT nontherapeutic] : patient has tried OTC's including aspirin, Ibuprofen, Aleve, etc or prescription NSAIDS, and/or exercises at home and/or physical therapy without satisfactory response [Risks, benefits, alternatives discussed / Verbal consent obtained] : the risks benefits, and alternatives have been discussed, and verbal consent was obtained [Precise injection in area of tear] : precise injection in area of tear [All ultrasound images have been permanently captured and stored accordingly in our picture archiving and communication system] : All ultrasound images have been permanently captured and stored accordingly in our picture archiving and communication system [Visualization of the needle and placement of injection was performed without complication] : visualization of the needle and placement of injection was performed without complication [Pain] : pain [FreeTextEntry3] : Large joint injection was performed of the left. The indication for this procedure was pain, inflammation and x-ray evidence of Osteoarthritis on this or prior visit. The site was prepped with betadine, ethyl chloride sprayed topically and sterile technique used. An injection of Zilretta cc of 32 unites 5mg was used.\par \par Patient was advised to call if redness, pain or fever occur and apply ice for 15 minutes out of every hour for the next 12-24 hours as tolerated.\par \par Patient has tried OTC's including aspirin, Ibuprofen, Aleve etc, or prescription NSAIDS, and/or exercises at home and/or physical therapy without satisfactory response, patient had decreased mobility in the joint and the risks, benefits and alternatives have been discussed, and verbal consent was obtained. Ultrasound guidance was indicated for this patient due to precise injection in the area of tear. All ultrasound images have been permanently captured and stored accordingly in our picture archiving and communication system. Visualization of the needle and placement of injection was performed without complication.\par Large joint injection was performed of the right. The indication for this procedure was pain, inflammation and x-ray evidence of Osteoarthritis on this or prior visit. The site was prepped with betadine, ethyl chloride sprayed topically and sterile technique used. An injection of Zilretta cc of 32 unites 5mg was used.\par \par Patient was advised to call if redness, pain or fever occur and apply ice for 15 minutes out of every hour for the next 12-24 hours as tolerated.\par \par Patient has tried OTC's including aspirin, Ibuprofen, Aleve etc, or prescription NSAIDS, and/or exercises at home and/or physical therapy without satisfactory response, patient had decreased mobility in the joint and the risks, benefits and alternatives have been discussed, and verbal consent was obtained. Ultrasound guidance was indicated for this patient due to precise injection in the area of tear. All ultrasound images have been permanently captured and stored accordingly in our picture archiving and communication system. Visualization of the needle and placement of injection was performed without complication.\par \par \par

## 2022-10-11 NOTE — DISCUSSION/SUMMARY
[Medication Risks Reviewed] : Medication risks reviewed [de-identified] : patient felt great relief from last zilretta injection, recommend using zilretta again for another 6 months of relief \par evaluated knee and decided to proceed with zilretta injections, discussed future treatment and option, will proceed, discussed possible need for surgery vs alternatives in future\par The risks, benefits and contents of the injection have been discussed.  Risks include but are not limited to allergic reaction, flare reaction, permanent white skin discoloration at the injection site and infection.  The patient understands the risks and agrees to having the injection.  All questions have been answered.\par \par Zilretta BL knees Discussed the timing of the injections and the follow up that is needed. Advised the pt to ice the area that was injected and informed the pt it may take a few days for the injection to give relief.\par \par recommend patient going to see pm for his back \par follow up prn

## 2022-10-12 ENCOUNTER — APPOINTMENT (OUTPATIENT)
Dept: PAIN MANAGEMENT | Facility: CLINIC | Age: 57
End: 2022-10-12
Payer: COMMERCIAL

## 2022-10-12 VITALS — WEIGHT: 205 LBS | HEIGHT: 69 IN | BODY MASS INDEX: 30.36 KG/M2

## 2022-10-12 PROCEDURE — 96372 THER/PROPH/DIAG INJ SC/IM: CPT | Mod: 59

## 2022-10-12 PROCEDURE — 20552 NJX 1/MLT TRIGGER POINT 1/2: CPT

## 2022-10-12 PROCEDURE — J3490M: CUSTOM

## 2022-10-12 PROCEDURE — 99214 OFFICE O/P EST MOD 30 MIN: CPT | Mod: 25

## 2022-10-12 NOTE — PROCEDURE
[FreeTextEntry3] : Trigger Point was performed because of pain inflammation Anesthesia: ethyl chloride sprayed topically.: Lidocaine .1% 2 cc Marcaine:.25% 2cc  kenalog 10mg/cc 2cc :Needle size: 25 gauge 1 1/2inch.  Medication was injected in the right trapezius and cervical  muscles  . Patient has tried OTC's including aspirin, Ibuprofen, Aleve etc or prescription NSAIDS, and/or exercises at home and/ or physical therapy without satisfactory response After verbal consent using sterile preparation and technique.The risks, benefits, and alternatives to cortisone injection were explained in full to the patient. Risks outlined include but are not limited to infection, sepsis, bleeding, scarring, skin discoloration, temporary increase in pain, syncopal episode, failure to resolve symptoms, allergic reaction, symptom recurrence, and elevation of blood sugar in diabetics. Patient understood the risks. All questions were answered. After discussion of options, patient requested an injection. Oral informed consent was obtained and sterile prep was done of the injection site. Sterile technique was utilized for the procedure including the preparation of the solutions used for the injection. Patient tolerated the procedure well. Advised to ice the injection site this evening.  Sterile technique used Prep with alcohol locally to site.\par \par

## 2022-10-12 NOTE — PHYSICAL EXAM
[de-identified] : PHYSICAL EXAM\par \par Constitutional: \par Appears well, no apparent distress\par Ability to communicate: Normal\par Respiratory: non-labored breathing\par Skin: no rash noted\par Head: normocephalic, atraumatic\par Neck: no visible thyroid enlargement\par Eyes: extraocular movements intact\par Neurologic: alert and oriented x3\par Psychiatric: normal mood, affect, and behavior\par \par \par Neck: Palpation of the cervical spine is as follows: right trapezial spasm,  right trapezial tenderness,  right paracervical spasm,  right paracervical tenderness. Range of motion of the cervical spine is as follows: diminished range of motion in all planes Pain at extremes of rotation to right . Stiffness at extremes of rotation to right . Strength Testing for the cervical spine is as follows:  Right Deltoid strength 5/5 Right Biceps 5/5, Right Triceps 5/5,  Right Wrist Flexors 5/5, Right Finger Abductors 5/5, and Right Grasp 5/5 Neurological testing for the cervical spine is as follows: positive right  facet loading. light touch is intact throughout both upper extremities, normal deep tendon reflexes bilateral upper extremities, negative Spurling test and negative Lock reflex \par \par \par Assessment\par \par Spondylosis of cervical region (M47.812)\par Cervicalgia (M54.2)\par \par \par

## 2022-10-12 NOTE — HISTORY OF PRESENT ILLNESS
[Neck] : neck [Lower back] : lower back [8] : 8 [Stabbing] : stabbing [Constant] : constant [Sleep] : sleep [Nothing helps with pain getting better] : Nothing helps with pain getting better [Lying in bed] : lying in bed [Full time] : Work status: full time [de-identified] : pt states his neck and lower back is painful  [] : no [FreeTextEntry7] : lower back

## 2022-10-31 ENCOUNTER — APPOINTMENT (OUTPATIENT)
Dept: PAIN MANAGEMENT | Facility: CLINIC | Age: 57
End: 2022-10-31

## 2022-11-23 ENCOUNTER — APPOINTMENT (OUTPATIENT)
Dept: PAIN MANAGEMENT | Facility: CLINIC | Age: 57
End: 2022-11-23

## 2022-11-23 VITALS — WEIGHT: 205 LBS | BODY MASS INDEX: 30.36 KG/M2 | HEIGHT: 69 IN

## 2022-11-23 PROCEDURE — 20553 NJX 1/MLT TRIGGER POINTS 3/>: CPT

## 2022-11-23 PROCEDURE — 99214 OFFICE O/P EST MOD 30 MIN: CPT | Mod: 25

## 2022-11-23 PROCEDURE — J3490M: CUSTOM

## 2022-11-23 NOTE — PHYSICAL EXAM
[de-identified] : PHYSICAL EXAM\par \par Constitutional: \par Appears well, no apparent distress\par Ability to communicate: Normal\par Respiratory: non-labored breathing\par Skin: no rash noted\par Head: normocephalic, atraumatic\par Neck: no visible thyroid enlargement\par Eyes: extraocular movements intact\par Neurologic: alert and oriented x3\par Psychiatric: normal mood, affect, and behavior\par \par Cervical:\par Palpation: bilateral trapezial spasm, bilateral trapezius tenderness and bilateral paracervical tenderness\par ROM: Diminished range of motion in all plains.  Patient notes pain at extremes of extension and rotation bilaterally.  Stiffness at extremes of extension and rotation bilaterally\par MMT: Motor exam is 5/5 through out bilateral upper extremities.\par Sensation: Light touch and pain is intact throughout bilateral upper extremities.\par Reflexes: No sustained clonus.\par Special Testing: Positive bilateral Spurling test \par \par Assessemnt:\par Radiculopathy of cervical region (M54.12)\par Cervicalgia (M54.2)\par \par \par Plan:\par After discussing various treatment options with the patient including but not limited to oral medications, physical therapy, exercise modalities as well as interventional spinal injections, we have decided with the following plan:\par \par MRI Review \par I personally reviewed the MRI/CT scan images and agree with the radiologist's report.  The radiological findings were discussed with the patient. \par  \par \par .\par \par The risks, benefits and alternatives of the proposed procedure were explained in detail with the patient.  The risks outlined include but are not limited to infection, bleeding, post dural puncture headache, nerve injury, a temporary increase in pain, failure to resolve symptoms, allergic reaction, symptom recurrence, and possible elevation of blood sugar.  All questions were answered to patient's satisfaction and he/she verbalized an understanding.\par \par Follow up 1-2 weeks post injection for re-evaluation.\par \par Continue home exercises, stretching, activity modification, physical therapy, and conservative care.\par \par \par \par \par  [] : lumbar paraspinal tenderness [Flexion] : flexion [Extension] : extension

## 2022-11-23 NOTE — HISTORY OF PRESENT ILLNESS
[Radiating] : radiating [Standing] : standing [Walking] : walking [Bending forward] : bending forward [de-identified] : pt states his neck and lower back is painful  [Neck] : neck [Lower back] : lower back [8] : 8 [Stabbing] : stabbing [Constant] : constant [Sleep] : sleep [Nothing helps with pain getting better] : Nothing helps with pain getting better [Lying in bed] : lying in bed [Full time] : Work status: full time [] : no [FreeTextEntry7] : lower back  [FreeTextEntry9] : tpi

## 2022-11-23 NOTE — DISCUSSION/SUMMARY
[de-identified] : prior paulina with 50% releif of pain improved rom and adls,\par Pt. presents with 50% Relief of pain and improvement in ROM and ADLS post injection.  Able to sit, stand, walk, sleep for longer periods of time.  Anticipated functional improvement and relief of at least 70% or greater with repeat injection for cumulative relief.\par

## 2022-11-23 NOTE — PROCEDURE
[Trigger point 3 or more muscle groups] : Trigger point 3 or more muscle groups [Other: ____] : [unfilled] [Pain] : pain [Inflammation] : inflammation [Alcohol] : alcohol [Ethyl Chloride sprayed topically] : ethyl chloride sprayed topically [Sterile technique used] : sterile technique used [___ cc    1%] : Lidocaine ~Vcc of 1%  [___ cc    0.25%] : Bupivacaine (Marcaine) ~Vcc of 0.25%  [___ cc    10mg] : Triamcinolone (Kenalog) ~Vcc of 10 mg  [] : Patient tolerated procedure well [Call if redness, pain or fever occur] : call if redness, pain or fever occur [Risks, benefits, alternatives discussed / Verbal consent obtained] : the risks benefits, and alternatives have been discussed, and verbal consent was obtained

## 2022-12-12 ENCOUNTER — APPOINTMENT (OUTPATIENT)
Dept: PAIN MANAGEMENT | Facility: CLINIC | Age: 57
End: 2022-12-12

## 2022-12-15 ENCOUNTER — APPOINTMENT (OUTPATIENT)
Dept: PAIN MANAGEMENT | Facility: CLINIC | Age: 57
End: 2022-12-15

## 2022-12-15 PROCEDURE — 62321 NJX INTERLAMINAR CRV/THRC: CPT

## 2022-12-15 NOTE — PROCEDURE
[FreeTextEntry3] : Date of Service: 12/15/2022 \par \par Account: [id number]\par \par Patient: SVITLANA CAMPBELL \par \par YOB: 1965\par \par Age: 57 year\par \par \par Surgeon: Rosales Roca M.D.\par \par Pre-Operative Diagnosis: Cervical Radiculopathy\par \par Post Operative Diagnosis: Cervical Radiculopathy\par \par Procedure: Interlaminar cervical epidural steroid injection (C7-T1) under fluoroscopic guidance \par \par Anesthesia: MAC\par \par \par This procedure was carried out using fluoroscopic guidance.  The risks and benefits of the procedure were discussed extensively with the patient.  The consent of the patient was obtained and the following procedure was performed.\par \par The patient was placed in the prone position using a thoracic and chin support.  The cervical area was prepped and draped in a sterile fashion.  The fluoroscope visualized the C7-T1 interspace using slight cephalad-caudad angulation and this area was marked.  Using sterile technique the superficial skin was anesthetized with 1% Lidocaine without epinephrine.  A 18 gauge Tuohoy needle was advanced under fluoroscopy using [id number] technique until ligament was engaged.  The stilette was then removed and a column of preservative free normal saline flushed through the tuohoy needle and left with a concave fluid level above the butterfly portion of the tuohoy needle.  The needle was then advanced under fluoroscopic guidance until the column of saline disappeared.  Lateral view confirmed final needle tip placement in the epidural space.  After negative aspiration for heme and CSF, 1 cc of omnipaque confirmed good cervical epiduragram.  \par \par Cervical epidurogram showed no evidence of intrathecal or intravascular flow, and good bilateral epidural flow from C3 to T2 levels.  \par \par An injectate of 3cc of preservative free normal saline plus 12 mg of betamethasone was then injected into the epidural space. The needle was subsequently removed and pressure was applied.\par \par Anesthesia personnel were present throughout the procedure.  The patient tolerated the procedure well and was instructed to contact me immediately if there were any problems.\par \par \par Rosales Roca M.D.\par

## 2023-01-01 NOTE — ED PROVIDER NOTE - MUSCULOSKELETAL NECK EXAM
no deformity, pain or tenderness. no restriction of movement Please follow up with your pediatrician 1-2 days after your child is discharged from the hospital. Return to the ED for worsening or persistent symptoms or any other concerns.  - Follow up with GI in 2 to 3 days

## 2023-01-09 ENCOUNTER — APPOINTMENT (OUTPATIENT)
Dept: PAIN MANAGEMENT | Facility: CLINIC | Age: 58
End: 2023-01-09
Payer: COMMERCIAL

## 2023-01-09 VITALS — BODY MASS INDEX: 30.36 KG/M2 | HEIGHT: 69 IN | WEIGHT: 205 LBS

## 2023-01-09 PROCEDURE — J3490M: CUSTOM

## 2023-01-09 PROCEDURE — 99214 OFFICE O/P EST MOD 30 MIN: CPT | Mod: 25

## 2023-01-09 PROCEDURE — 20553 NJX 1/MLT TRIGGER POINTS 3/>: CPT

## 2023-01-09 NOTE — PROCEDURE
[Trigger point 3 or more muscle groups] : Trigger point 3 or more muscle groups [Bilateral] : bilaterally of the [Cervical paraspinal muscle] : cervical paraspinal muscle [Thoracic paraspinal muscle] : thoracic paraspinal muscle [Other: ____] : [unfilled] [Pain] : pain [Inflammation] : inflammation [Alcohol] : alcohol [Ethyl Chloride sprayed topically] : ethyl chloride sprayed topically [Sterile technique used] : sterile technique used [___ cc    1%] : Lidocaine ~Vcc of 1%  [___ cc    0.25%] : Bupivacaine (Marcaine) ~Vcc of 0.25%  [___ cc    10mg] : Triamcinolone (Kenalog) ~Vcc of 10 mg  [] : Patient tolerated procedure well [Risks, benefits, alternatives discussed / Verbal consent obtained] : the risks benefits, and alternatives have been discussed, and verbal consent was obtained

## 2023-01-09 NOTE — HISTORY OF PRESENT ILLNESS
[Neck] : neck [6] : 6 [Dull/Aching] : dull/aching [Radiating] : radiating [] : yes [Constant] : constant [Household chores] : household chores [Leisure] : leisure [Sleep] : sleep [Meds] : meds [Injection therapy] : injection therapy [Bending forward] : bending forward [FreeTextEntry7] : head

## 2023-01-09 NOTE — PHYSICAL EXAM
[] : lumbar paraspinal tenderness [Flexion] : flexion [Extension] : extension [de-identified] : PHYSICAL EXAM\par \par Constitutional: \par Appears well, no apparent distress\par Ability to communicate: Normal\par Respiratory: non-labored breathing\par Skin: no rash noted\par Head: normocephalic, atraumatic\par Neck: no visible thyroid enlargement\par Eyes: extraocular movements intact\par Neurologic: alert and oriented x3\par Psychiatric: normal mood, affect, and behavior\par \par Cervical:\par Palpation: bilateral trapezial spasm, bilateral trapezius tenderness and bilateral paracervical tenderness\par ROM: Diminished range of motion in all plains.  Patient notes pain at extremes of extension and rotation bilaterally.  Stiffness at extremes of extension and rotation bilaterally\par MMT: Motor exam is 5/5 through out bilateral upper extremities.\par Sensation: Light touch and pain is intact throughout bilateral upper extremities.\par Reflexes: No sustained clonus.\par Special Testing: Positive bilateral Spurling test \par \par Assessemnt:\par Radiculopathy of cervical region (M54.12)\par Cervicalgia (M54.2)\par \par \par Plan:\par After discussing various treatment options with the patient including but not limited to oral medications, physical therapy, exercise modalities as well as interventional spinal injections, we have decided with the following plan:\par \par MRI Review \par I personally reviewed the MRI/CT scan images and agree with the radiologist's report.  The radiological findings were discussed with the patient. \par  \par \par .\par \par The risks, benefits and alternatives of the proposed procedure were explained in detail with the patient.  The risks outlined include but are not limited to infection, bleeding, post dural puncture headache, nerve injury, a temporary increase in pain, failure to resolve symptoms, allergic reaction, symptom recurrence, and possible elevation of blood sugar.  All questions were answered to patient's satisfaction and he/she verbalized an understanding.\par \par Follow up 1-2 weeks post injection for re-evaluation.\par \par Continue home exercises, stretching, activity modification, physical therapy, and conservative care.\par \par \par \par \par

## 2023-01-13 ENCOUNTER — APPOINTMENT (OUTPATIENT)
Dept: ORTHOPEDIC SURGERY | Facility: CLINIC | Age: 58
End: 2023-01-13

## 2023-01-16 ENCOUNTER — APPOINTMENT (OUTPATIENT)
Dept: ORTHOPEDIC SURGERY | Facility: CLINIC | Age: 58
End: 2023-01-16
Payer: COMMERCIAL

## 2023-01-16 VITALS — WEIGHT: 205 LBS | HEIGHT: 69 IN | BODY MASS INDEX: 30.36 KG/M2

## 2023-01-16 PROCEDURE — 99214 OFFICE O/P EST MOD 30 MIN: CPT | Mod: 25

## 2023-01-16 PROCEDURE — 20611 DRAIN/INJ JOINT/BURSA W/US: CPT | Mod: 50

## 2023-01-16 PROCEDURE — 73564 X-RAY EXAM KNEE 4 OR MORE: CPT | Mod: 50

## 2023-01-16 PROCEDURE — J3490M: CUSTOM

## 2023-01-17 NOTE — PHYSICAL EXAM
[5___] : hamstring 5[unfilled]/5 [] : non-antalgic [Bilateral] : knee bilaterally [Moderate tricompartmental OA medial narrowing] : Moderate tricompartmental OA medial narrowing Hpi Title: Evaluation of Skin Lesions Year Removed: 1900

## 2023-01-17 NOTE — PROCEDURE
[FreeTextEntry3] : Large joint injection was performed of the b/l knees. An injection of Bupivacaine (Marcaine) cc of 0.5% was used Betamethasone (Celestone) cc of 6mg. \par Patient was advised to call if redness, pain or fever occur and apply ice for 15 minutes out of every hour for the next 12-24 hours as tolerated. \par \par Patient has tried OTC's including aspirin, Ibuprofen, Aleve, etc or prescription NSAIDS, and/or exercises at home and/or physical therapy without satisfactory response and the risks benefits, and alternatives have been discussed, and verbal consent was obtained. \par Ultrasound guidance was indicated for this patient due to precise injection in area of tear. All ultrasound images have been permanently captured and stored accordingly in our picture archiving and communication system. Visualization of the needle and placement of injection was performed without complication. \par The findings showed no evidence of ligament, tendon or muscle tear and no effusion or other fluid collection.\par

## 2023-01-17 NOTE — DISCUSSION/SUMMARY
[Medication Risks Reviewed] : Medication risks reviewed [Surgical risks reviewed] : Surgical risks reviewed [de-identified] : Discussed risks of potential surgery. However, due to the risks of the surgery, we will try NSAIDs and therapy. Discussed management of medication.\par The risks and benefits of surgery have been discussed. Risks include but are not limited to bleeding, infection, reaction to anesthesia, injury to blood vessels and nerves, malunion, nonunion, DVT, PE, necessity of repeat surgery, chronic pain, loss of limb and death. The patient understands the risks and has chosen to proceed with conservative care. All questions have been answered.\par evaluated knee and decided to proceed with steroid injections, discussed future treatment and option, will proceed, discussed possible need for surgery vs alternatives in future\par given i limitations on zilretta we will hold off and use celestone today, discussed risks of total knee replacement surgery\par The risks, benefits and contents of the injection have been discussed.  Risks include but are not limited to allergic reaction, flare reaction, permanent white skin discoloration at the injection site and infection.  The patient understands the risks and agrees to having the injection.  All questions have been answered.\par \par

## 2023-01-17 NOTE — HISTORY OF PRESENT ILLNESS
[de-identified] : Patient is here for a follow up on bilateral knees. Patient notes he is here hopefully  for a zilretta injection in the knees. Patient notes last  injection from 10/10/22 gave him relief up until now. Patient claims pain is not excruciating and that he just wanted to come in and get the injections now rather than wait until they get worse. Patient notes pain is about the same in both.

## 2023-01-31 ENCOUNTER — APPOINTMENT (OUTPATIENT)
Dept: ORTHOPEDIC SURGERY | Facility: CLINIC | Age: 58
End: 2023-01-31
Payer: COMMERCIAL

## 2023-01-31 VITALS — WEIGHT: 205 LBS | BODY MASS INDEX: 30.36 KG/M2 | HEIGHT: 69 IN

## 2023-01-31 DIAGNOSIS — M18.12 UNILATERAL PRIMARY OSTEOARTHRITIS OF FIRST CARPOMETACARPAL JOINT, LEFT HAND: ICD-10-CM

## 2023-01-31 PROCEDURE — 20600 DRAIN/INJ JOINT/BURSA W/O US: CPT

## 2023-01-31 PROCEDURE — 99214 OFFICE O/P EST MOD 30 MIN: CPT | Mod: 25

## 2023-01-31 PROCEDURE — J3490M: CUSTOM | Mod: NC

## 2023-01-31 PROCEDURE — 73130 X-RAY EXAM OF HAND: CPT | Mod: LT

## 2023-01-31 PROCEDURE — 20526 THER INJECTION CARP TUNNEL: CPT | Mod: 50,59

## 2023-01-31 NOTE — ASSESSMENT
[FreeTextEntry1] : For R ulna nerve decomp/transp at the elbow and R CTR then L CTR (after 3/2)\par R/B/A of surgery discussed with the patient. Risks including but not limited to infection, nerve damage, tendon damage, pain, stiffness, recurrence, no resolution of symptoms, loss of function, limb or life. They understand and agree to surgery \par Return post op

## 2023-01-31 NOTE — HISTORY OF PRESENT ILLNESS
[8] : 8 [7] : 7 [Dull/Aching] : dull/aching [Sharp] : sharp [Throbbing] : throbbing [Tingling] : tingling [de-identified] : Bilateral CTS\par CuTS \par Injections helped in Sept  [FreeTextEntry1] : hands  [FreeTextEntry5] : pain is worse\par  [FreeTextEntry6] : numbness [de-identified] : no

## 2023-01-31 NOTE — DISCUSSION/SUMMARY
[de-identified] : Bilateral Carpal tunnel  was performed because of pain inflammation\par Anesthesia: ethyl chloride sprayed topically\par Celestone: An injection of Celestone 1cc\par Lidocaine: An injection of Lidocaine 1% 1cc\par Marcaine: An injection of Marcaine 0.5% 1cc\par x3\par \par Patient has tried OTC's including aspirin, Ibuprofen, Aleve etc or prescription NSAIDS, and/or exercises at home and/ or\par physical therapy without satisfactory response.\par After verbal consent using sterile preparation and technique. The risks, benefits, and alternatives to cortisone injection\par were explained in full to the patient. Risks outlined include but are not limited to infection, sepsis, bleeding, scarring, skin\par discoloration, temporary increase in pain, syncopal episode, failure to resolve symptoms, allergic reaction, symptom\par recurrence, and elevation of blood sugar in diabetics. Patient understood the risks. All questions were answered. After\par discussion of options, patient requested an injection. Oral informed consent was obtained and sterile prep was done of the\par injection site. Sterile technique was utilized for the procedure including the preparation of the solutions used for the\par injection. Patient tolerated the procedure well. Advised to ice the injection site this evening.\par Prep with betadine locally to site. Sterile technique used \par \par L Thumb CMC injection was performed because of pain inflammation and stiffness\par Anesthesia: ethyl chloride sprayed topically\par Celestone: An injection of Celestone 1cc\par Lidocaine: An injection of Lidocaine 1% 1cc\par Marcaine: An injection of Marcaine 0.5% 1cc\par \par Patient has tried OTC's including aspirin, Ibuprofen, Aleve etc or prescription NSAIDS, and/or exercises at home and/ or\par physical therapy without satisfactory response.\par After verbal consent using sterile preparation and technique. The risks, benefits, and alternatives to cortisone injection\par were explained in full to the patient. Risks outlined include but are not limited to infection, sepsis, bleeding, scarring, skin\par discoloration, temporary increase in pain, syncopal episode, failure to resolve symptoms, allergic reaction, symptom\par recurrence, and elevation of blood sugar in diabetics. Patient understood the risks. All questions were answered. After\par discussion of options, patient requested an injection. Oral informed consent was obtained and sterile prep was done of the\par injection site. Sterile technique was utilized for the procedure including the preparation of the solutions used for the\par injection. Patient tolerated the procedure well. Advised to ice the injection site this evening.\par Prep with betadine locally to site. Sterile technique used

## 2023-01-31 NOTE — PHYSICAL EXAM
[de-identified] : R elbow:\par Tender at the ulna nerve\par Pain with flexion\par +tinels at the ulna nerve\par +hyperflexion test\par \par R hand:\par +lumbrical atrophy\par Intrinsic weakness\par Decreased sensation in the ulna nerve distribution\par Tender volar wrist \par Good finger ROM \par +Tinels \par +Phalens \par +Compression test \par Decreased sensation median nerve distribution\par \par L hand: \par +thenar atrophy \par Tender volar wrist \par Good finger ROM \par +Tinels \par +Phalens \par +Compression test \par Decreased sensation median nerve distribution\par +Thumb CMC tender, swelling\par \par Xrays CMC OA\par \par \par

## 2023-02-13 ENCOUNTER — APPOINTMENT (OUTPATIENT)
Dept: PAIN MANAGEMENT | Facility: CLINIC | Age: 58
End: 2023-02-13
Payer: COMMERCIAL

## 2023-02-13 VITALS — WEIGHT: 210 LBS | BODY MASS INDEX: 31.1 KG/M2 | HEIGHT: 69 IN

## 2023-02-13 PROCEDURE — J3490M: CUSTOM

## 2023-02-13 PROCEDURE — 20552 NJX 1/MLT TRIGGER POINT 1/2: CPT

## 2023-02-13 PROCEDURE — 99214 OFFICE O/P EST MOD 30 MIN: CPT | Mod: 25

## 2023-02-13 NOTE — DISCUSSION/SUMMARY
[de-identified] : chronic axial neck pain with prior relief of pain and adls 70% with tpi\par will repeat tpi

## 2023-02-13 NOTE — PHYSICAL EXAM
[de-identified] : PHYSICAL EXAM\par \par Constitutional: \par Appears well, no apparent distress\par Ability to communicate: Normal\par Respiratory: non-labored breathing\par Skin: no rash noted\par Head: normocephalic, atraumatic\par Neck: no visible thyroid enlargement\par Eyes: extraocular movements intact\par Neurologic: alert and oriented x3\par Psychiatric: normal mood, affect, and behavior\par \par \par Neck: Palpation of the cervical spine is as follows: left trapezial spasm, right trapezial spasm, left trapezial tenderness, right trapezial tenderness, left paracervical spasm, right paracervical spasm, left paracervical tenderness and right paracervical tenderness. Range of motion of the cervical spine is as follows: diminished range of motion in all planes Pain at extremes of rotation to right and rotation to left. Stiffness at extremes of rotation to right and rotation to left. Strength Testing for the cervical spine is as follows: Left Deltoid strength 5/5, Right Deltoid strength 5/5, Left Biceps 5/5, Right Biceps 5/5, Left Triceps 5/5, Right Triceps 5/5, Left Wrist Flexors 5/5, Right Wrist Flexors 5/5, Left Finger Abductors 5/5, Right Finger Abductors 5/5, Left Grasp 5/5 and Right Grasp 5/5 Neurological testing for the cervical spine is as follows: positive bilateral facet loading. light touch is intact throughout both upper extremities, normal deep tendon reflexes bilateral upper extremities, negative Spurling test and negative Lock reflex \par \par \par

## 2023-02-13 NOTE — PROCEDURE
[FreeTextEntry3] : Trigger Point was performed because of pain inflammation Anesthesia: ethyl chloride sprayed topically.: Lidocaine .1% 2 cc Marcaine:.25% 2cc  kenalog 10mg/cc 2cc :Needle size: 25 gauge 1 1/2inch.  Medication was injected in the right and left trapezius muscles  . Patient has tried OTC's including aspirin, Ibuprofen, Aleve etc or prescription NSAIDS, and/or exercises at home and/ or physical therapy without satisfactory response After verbal consent using sterile preparation and technique.The risks, benefits, and alternatives to cortisone injection were explained in full to the patient. Risks outlined include but are not limited to infection, sepsis, bleeding, scarring, skin discoloration, temporary increase in pain, syncopal episode, failure to resolve symptoms, allergic reaction, symptom recurrence, and elevation of blood sugar in diabetics. Patient understood the risks. All questions were answered. After discussion of options, patient requested an injection. Oral informed consent was obtained and sterile prep was done of the injection site. Sterile technique was utilized for the procedure including the preparation of the solutions used for the injection. Patient tolerated the procedure well. Advised to ice the injection site this evening.  Sterile technique used Prep with alcohol locally to site.\par \par

## 2023-02-13 NOTE — HISTORY OF PRESENT ILLNESS
[Neck] : neck [8] : 8 [6] : 6 [Sharp] : sharp [] : no [Constant] : constant [Household chores] : household chores [Leisure] : leisure [Bending forward] : bending forward [FreeTextEntry7] : Head [FreeTextEntry9] : tpi [de-identified] : turning, positioning

## 2023-02-27 ENCOUNTER — APPOINTMENT (OUTPATIENT)
Dept: ORTHOPEDIC SURGERY | Facility: CLINIC | Age: 58
End: 2023-02-27
Payer: COMMERCIAL

## 2023-02-27 VITALS — HEIGHT: 69 IN | WEIGHT: 210 LBS | BODY MASS INDEX: 31.1 KG/M2

## 2023-02-27 PROCEDURE — 99213 OFFICE O/P EST LOW 20 MIN: CPT

## 2023-02-28 NOTE — DISCUSSION/SUMMARY
[Medication Risks Reviewed] : Medication risks reviewed [Surgical risks reviewed] : Surgical risks reviewed [de-identified] : Has seen a return of b/l knee pain after csi at last visit. Discussed timing of and that it is too soon for repeat injection. Can continue with nsaids and discussed risks of side effects and timing and management of medication.  side effects can include gi ulcers and irritation as well as kidney failure and bleeding issues and f/up in another 6 weeks.

## 2023-02-28 NOTE — HISTORY OF PRESENT ILLNESS
[de-identified] : Patient is here to follow up on bilateral knees. CSI from 1/16/23, and only gave 4 week relief for left knee; right knee no improvement. Pain is worse with prolonged rest.

## 2023-03-27 ENCOUNTER — APPOINTMENT (OUTPATIENT)
Dept: PAIN MANAGEMENT | Facility: CLINIC | Age: 58
End: 2023-03-27
Payer: COMMERCIAL

## 2023-03-27 VITALS — BODY MASS INDEX: 31.1 KG/M2 | WEIGHT: 210 LBS | HEIGHT: 69 IN

## 2023-03-27 PROCEDURE — 20552 NJX 1/MLT TRIGGER POINT 1/2: CPT

## 2023-03-27 PROCEDURE — 99214 OFFICE O/P EST MOD 30 MIN: CPT | Mod: 25

## 2023-03-27 PROCEDURE — J3490M: CUSTOM

## 2023-03-27 NOTE — ASSESSMENT
[FreeTextEntry1] : prior LESI with >80% relief sustained several months with improved rom and adls\par \par prior C7-T1 MARITZA with >80% relief of pain improved rom and adls sustained several months ; resolution HAs

## 2023-03-27 NOTE — DISCUSSION/SUMMARY
[Surgical risks reviewed] : Surgical risks reviewed [de-identified] : proceed L5-S1 LESI and C7-T1 MARITZA \par \par After discussing various treatment options with the patient including but not limited to oral medications, physical therapy, exercise, modalities as well as interventional spinal injections, we have decided with the following plan:\par I personally reviewed the MRI/CT scan images and agree with the radiologist's report. The radiological findings were discussed with the patient.\par The risks, benefits, contents and alternatives to injection were explained in full to the patient. Risks outlined include but are not limited to infection,sepsis, bleeding, post-dural puncture headache, nerve damage, temporary increase in pain, syncopal episode, failure to resolve symptoms, allergic reaction, symptom recurrence, and elevation of blood sugar in diabetics. Cortisone may cause immunosuppression. Patient understands the risks. All questions were answered. After discussion of options, patient requested an injection. Information regarding the injection was given to the patient. Which medications to stop prior to the injection was explained to the patient as well.\par Follow up in 1-2 weeks post injection for re-evaluation.\par Continue Home exercises, stretching, activity modification, physical therapy, and conservative care.\par Patient is presenting with acute/sub-acute radicular pain with impairment in ADLs and functionality. The pain has not responded to conservative care including nsaid therapy and/or physical therapy. There is no bleeding tendency, unstable medical condition, or systemic infection.\par

## 2023-03-27 NOTE — PHYSICAL EXAM
[de-identified] : PHYSICAL EXAM\par \par Constitutional: \par Appears well, no apparent distress\par Ability to communicate: Normal\par Respiratory: non-labored breathing\par Skin: no rash noted\par Head: normocephalic, atraumatic\par Neck: no visible thyroid enlargement\par Eyes: extraocular movements intact\par Neurologic: alert and oriented x3\par Psychiatric: normal mood, affect, and behavior\par \par Lumbar Spine: \par Palpation: left and right lumbar paraspinal spasm and left and right lumbar paraspinal tenderness to palpation.\par ROM: Diminished range of motion in all plains.  Patient notes pain with lateral bending to the left and right.\par MMT: Motor exam is 5/5 through out bilateral lower extremities.\par Sensation: Light touch and pain is intact throughout bilateral lower extremities.\par Reflexes: achilles and patella reflexes are intact and  symmetrical.  No sustained clonus.\par Special Testing: Positive kemps maneuver on the left and right side\par \par \par Neck: Palpation of the cervical spine is as follows: left trapezial spasm, right trapezial spasm, left trapezial tenderness, right trapezial tenderness, left paracervical spasm, right paracervical spasm, left paracervical tenderness and right paracervical tenderness. Range of motion of the cervical spine is as follows: diminished range of motion in all planes Pain at extremes of rotation to right and rotation to left. Stiffness at extremes of rotation to right and rotation to left. Strength Testing for the cervical spine is as follows: Left Deltoid strength 5/5, Right Deltoid strength 5/5, Left Biceps 5/5, Right Biceps 5/5, Left Triceps 5/5, Right Triceps 5/5, Left Wrist Flexors 5/5, Right Wrist Flexors 5/5, Left Finger Abductors 5/5, Right Finger Abductors 5/5, Left Grasp 5/5 and Right Grasp 5/5 Neurological testing for the cervical spine is as follows: positive bilateral facet loading. light touch is intact throughout both upper extremities, normal deep tendon reflexes bilateral upper extremities, negative Spurling test and negative Lock reflex \par \par \par

## 2023-03-27 NOTE — PROCEDURE
[Trigger point 1-2 muscle groups] : trigger point 1-2 muscle groups [Bilateral] : bilaterally of the [Lumbar paraspinal muscle] : lumbar paraspinal muscle [Cervical paraspinal muscle] : cervical paraspinal muscle [Pain] : pain [Inflammation] : inflammation [Alcohol] : alcohol [Ethyl Chloride sprayed topically] : ethyl chloride sprayed topically [Sterile technique used] : sterile technique used [___ cc    1%] : Lidocaine ~Vcc of 1%  [___ cc    0.25%] : Bupivacaine (Marcaine) ~Vcc of 0.25%  [___ cc    10mg] : Triamcinolone (Kenalog) ~Vcc of 10 mg  [Call if redness, pain or fever occur] : call if redness, pain or fever occur [Risks, benefits, alternatives discussed / Verbal consent obtained] : the risks benefits, and alternatives have been discussed, and verbal consent was obtained

## 2023-03-27 NOTE — HISTORY OF PRESENT ILLNESS
[Neck] : neck [Lower back] : lower back [9] : 9 [8] : 8 [Radiating] : radiating [Constant] : constant [Sleep] : sleep [Injection therapy] : injection therapy [Lying in bed] : lying in bed [FreeTextEntry1] : pt is following up for neck pain , the pain goes up into the head , he states it gives him headaches \par and lower back the pain stays in the back  [] : no [FreeTextEntry6] : headaches  [FreeTextEntry9] : TPI  [de-identified] : driving

## 2023-03-28 ENCOUNTER — APPOINTMENT (OUTPATIENT)
Dept: ORTHOPEDIC SURGERY | Facility: CLINIC | Age: 58
End: 2023-03-28
Payer: COMMERCIAL

## 2023-03-28 VITALS — WEIGHT: 210 LBS | BODY MASS INDEX: 31.1 KG/M2 | HEIGHT: 69 IN

## 2023-03-28 DIAGNOSIS — M18.12 UNILATERAL PRIMARY OSTEOARTHRITIS OF FIRST CARPOMETACARPAL JOINT, LEFT HAND: ICD-10-CM

## 2023-03-28 DIAGNOSIS — G56.21 LESION OF ULNAR NERVE, RIGHT UPPER LIMB: ICD-10-CM

## 2023-03-28 PROCEDURE — J3490M: CUSTOM | Mod: NC

## 2023-03-28 PROCEDURE — 99214 OFFICE O/P EST MOD 30 MIN: CPT | Mod: 25

## 2023-03-28 PROCEDURE — 20600 DRAIN/INJ JOINT/BURSA W/O US: CPT | Mod: LT

## 2023-03-28 PROCEDURE — 20526 THER INJECTION CARP TUNNEL: CPT | Mod: 50

## 2023-03-28 NOTE — ASSESSMENT
[FreeTextEntry1] : For L CTR and L thumb CMC arthroplasty suspensionplasty then R ulna nerve decomp/transp at the elbow and R CTR (after 4/28)\par R/B/A of surgery discussed with the patient. Risks including but not limited to infection, nerve damage, tendon damage, pain, stiffness, recurrence, no resolution of symptoms, loss of function, limb or life. They understand and agree to surgery \par Return post op

## 2023-03-28 NOTE — HISTORY OF PRESENT ILLNESS
[4] : 4 [6] : 6 [Tingling] : tingling [de-identified] : Bilateral CTS\par R CuTS\par L Thumb CMC  [] : no [FreeTextEntry1] : shun hands [FreeTextEntry5] : pain is worse [FreeTextEntry6] : numbness [de-identified] : none

## 2023-03-28 NOTE — PHYSICAL EXAM
[de-identified] : R elbow:\par Tender at the ulna nerve\par Pain with flexion\par +tinels at the ulna nerve\par +hyperflexion test\par \par R hand:\par +lumbrical atrophy\par Intrinsic weakness\par Decreased sensation in the ulna nerve distribution\par Tender volar wrist \par Good finger ROM \par +Tinels \par +Phalens \par +Compression test \par Decreased sensation median nerve distribution\par \par L hand: \par +thenar atrophy \par Tender volar wrist \par Good finger ROM \par +Tinels \par +Phalens \par +Compression test \par Decreased sensation median nerve distribution\par +Thumb CMC tender, swelling\par \par Xrays CMC OA\par \par \par

## 2023-03-28 NOTE — DISCUSSION/SUMMARY
[de-identified] : Bilateral Carpal tunnel  was performed because of pain inflammation\par Anesthesia: ethyl chloride sprayed topically\par Celestone: An injection of Celestone 1cc\par Lidocaine: An injection of Lidocaine 1% 1cc\par Marcaine: An injection of Marcaine 0.5% 1cc\par x2\par \par Patient has tried OTC's including aspirin, Ibuprofen, Aleve etc or prescription NSAIDS, and/or exercises at home and/ or\par physical therapy without satisfactory response.\par After verbal consent using sterile preparation and technique. The risks, benefits, and alternatives to cortisone injection\par were explained in full to the patient. Risks outlined include but are not limited to infection, sepsis, bleeding, scarring, skin\par discoloration, temporary increase in pain, syncopal episode, failure to resolve symptoms, allergic reaction, symptom\par recurrence, and elevation of blood sugar in diabetics. Patient understood the risks. All questions were answered. After\par discussion of options, patient requested an injection. Oral informed consent was obtained and sterile prep was done of the\par injection site. Sterile technique was utilized for the procedure including the preparation of the solutions used for the\par injection. Patient tolerated the procedure well. Advised to ice the injection site this evening.\par Prep with betadine locally to site. Sterile technique used \par \par L Thumb CMC injection was performed because of pain inflammation and stiffness\par Anesthesia: ethyl chloride sprayed topically\par Celestone: An injection of Celestone 1cc\par Lidocaine: An injection of Lidocaine 1% 1cc\par Marcaine: An injection of Marcaine 0.5% 1cc\par \par Patient has tried OTC's including aspirin, Ibuprofen, Aleve etc or prescription NSAIDS, and/or exercises at home and/ or\par physical therapy without satisfactory response.\par After verbal consent using sterile preparation and technique. The risks, benefits, and alternatives to cortisone injection\par were explained in full to the patient. Risks outlined include but are not limited to infection, sepsis, bleeding, scarring, skin\par discoloration, temporary increase in pain, syncopal episode, failure to resolve symptoms, allergic reaction, symptom\par recurrence, and elevation of blood sugar in diabetics. Patient understood the risks. All questions were answered. After\par discussion of options, patient requested an injection. Oral informed consent was obtained and sterile prep was done of the\par injection site. Sterile technique was utilized for the procedure including the preparation of the solutions used for the\par injection. Patient tolerated the procedure well. Advised to ice the injection site this evening.\par Prep with betadine locally to site. Sterile technique used

## 2023-04-05 ENCOUNTER — APPOINTMENT (OUTPATIENT)
Dept: PAIN MANAGEMENT | Facility: CLINIC | Age: 58
End: 2023-04-05

## 2023-04-12 ENCOUNTER — APPOINTMENT (OUTPATIENT)
Dept: PAIN MANAGEMENT | Facility: CLINIC | Age: 58
End: 2023-04-12

## 2023-04-17 ENCOUNTER — APPOINTMENT (OUTPATIENT)
Dept: ORTHOPEDIC SURGERY | Facility: CLINIC | Age: 58
End: 2023-04-17
Payer: COMMERCIAL

## 2023-04-17 VITALS — WEIGHT: 210 LBS | HEIGHT: 69 IN | BODY MASS INDEX: 31.1 KG/M2

## 2023-04-17 PROCEDURE — 20552 NJX 1/MLT TRIGGER POINT 1/2: CPT | Mod: LT

## 2023-04-17 PROCEDURE — 99215 OFFICE O/P EST HI 40 MIN: CPT | Mod: 25

## 2023-04-17 PROCEDURE — 20611 DRAIN/INJ JOINT/BURSA W/US: CPT | Mod: 50

## 2023-04-17 PROCEDURE — J3490M: CUSTOM

## 2023-04-18 NOTE — PHYSICAL EXAM
[5___] : hamstring 5[unfilled]/5 [Bilateral] : knee bilaterally [Moderate tricompartmental OA medial narrowing] : Moderate tricompartmental OA medial narrowing [] : non-antalgic

## 2023-04-18 NOTE — DISCUSSION/SUMMARY
[Medication Risks Reviewed] : Medication risks reviewed [Surgical risks reviewed] : Surgical risks reviewed [de-identified] : Discussed risks of potential surgery. However, due to the risks of the surgery, we will try NSAIDs and therapy. Discussed management of medication.\par The risks and benefits of surgery have been discussed. Risks include but are not limited to bleeding, infection, reaction to anesthesia, injury to blood vessels and nerves, malunion, nonunion, DVT, PE, necessity of repeat surgery, chronic pain, loss of limb and death. The patient understands the risks and has chosen to proceed with conservative care. All questions have been answered.\par evaluated knee and decided to proceed with steroid injections, discussed future treatment and option, will proceed, discussed possible need for surgery vs alternatives in future\par receommend zilretta to both knees today to hopefuly last longer than celestone injeciton did and also discussed visco but insurance does nto cover\par discussed timing of zilretta (1x per yr) approximately \par \par also will give a trigger point to left lower back spasm and discussed to fu w pain mgt if cont \par

## 2023-04-18 NOTE — HISTORY OF PRESENT ILLNESS
[de-identified] : Patient is here to follow up on bilateral knees. Pain is worse since last visit. Pain worsens with prolonged rest. Has not done any physical therapy. Has not taken any medications for pain. Has tried using ice which does not help.   he has had a celestone injeciotn in jan helped for a little while was inquiring about zilretta today\par also having left mid back spasms and would like to discuss

## 2023-04-18 NOTE — PROCEDURE
[Large Joint Injection] : Large joint injection [Bilateral] : bilaterally of the [Knee] : knee [Pain] : pain [X-ray evidence of Osteoarthritis on this or prior visit] : x-ray evidence of Osteoarthritis on this or prior visit [___ cc    32 units 5mg] : Zilretta ~Vcc of 32 units 5 mg  [Patient was advised to rest the joint(s) for ____ days] : patient was advised to rest the joint(s) for [unfilled] days [Precise injection in area of tear] : precise injection in area of tear [All ultrasound images have been permanently captured and stored accordingly in our picture archiving and communication system] : All ultrasound images have been permanently captured and stored accordingly in our picture archiving and communication system [Visualization of the needle and placement of injection was performed without complication] : visualization of the needle and placement of injection was performed without complication [Effusion noted on ligament tears] : effusion noted on ligament tears [Trigger point 1-2 muscle groups] : trigger point 1-2 muscle groups [Lumbar paraspinal muscle] : lumbar paraspinal muscle [Left] : of the left [Inflammation] : inflammation [Betadine] : betadine [Ethyl Chloride sprayed topically] : ethyl chloride sprayed topically [Sterile technique used] : sterile technique used [___ cc    6mg] :  Betamethasone (Celestone) ~Vcc of 6mg [___ cc    0.5%] : Bupivacaine (Marcaine) ~Vcc of 0.5%  [] : Patient tolerated procedure well [Call if redness, pain or fever occur] : call if redness, pain or fever occur [Apply ice for 15min out of every hour for the next 12-24 hours as tolerated] : apply ice for 15 minutes out of every hour for the next 12-24 hours as tolerated [Previous OTC use and PT nontherapeutic] : patient has tried OTC's including aspirin, Ibuprofen, Aleve, etc or prescription NSAIDS, and/or exercises at home and/or physical therapy without satisfactory response [Patient had decreased mobility in the joint] : patient had decreased mobility in the joint [Risks, benefits, alternatives discussed / Verbal consent obtained] : the risks benefits, and alternatives have been discussed, and verbal consent was obtained

## 2023-04-24 ENCOUNTER — APPOINTMENT (OUTPATIENT)
Dept: PAIN MANAGEMENT | Facility: CLINIC | Age: 58
End: 2023-04-24

## 2023-05-24 ENCOUNTER — APPOINTMENT (OUTPATIENT)
Dept: PAIN MANAGEMENT | Facility: CLINIC | Age: 58
End: 2023-05-24
Payer: COMMERCIAL

## 2023-05-24 ENCOUNTER — APPOINTMENT (OUTPATIENT)
Dept: PODIATRY | Facility: CLINIC | Age: 58
End: 2023-05-24
Payer: COMMERCIAL

## 2023-05-24 DIAGNOSIS — M77.32 CALCANEAL SPUR, LEFT FOOT: ICD-10-CM

## 2023-05-24 PROCEDURE — 99212 OFFICE O/P EST SF 10 MIN: CPT | Mod: 25

## 2023-05-24 PROCEDURE — 20605 DRAIN/INJ JOINT/BURSA W/O US: CPT | Mod: LT

## 2023-05-24 PROCEDURE — 73620 X-RAY EXAM OF FOOT: CPT | Mod: LT

## 2023-05-24 PROCEDURE — 62321 NJX INTERLAMINAR CRV/THRC: CPT

## 2023-05-24 NOTE — PROCEDURE
[FreeTextEntry3] : Date of Service: 05/24/2023 \par \par Account: [id number]\par \par Patient: SVITLANA CAMPBELL \par \par YOB: 1965\par \par Age: 57 year\par \par \par Surgeon: Rosales Roca M.D.\par \par Pre-Operative Diagnosis: Cervical Radiculopathy\par \par Post Operative Diagnosis: Cervical Radiculopathy\par \par Procedure: Interlaminar cervical epidural steroid injection (C7-T1) under fluoroscopic guidance \par \par Anesthesia: MAC\par \par \par This procedure was carried out using fluoroscopic guidance.  The risks and benefits of the procedure were discussed extensively with the patient.  The consent of the patient was obtained and the following procedure was performed.\par \par The patient was placed in the prone position using a thoracic and chin support.  The cervical area was prepped and draped in a sterile fashion.  The fluoroscope visualized the C7-T1 interspace using slight cephalad-caudad angulation and this area was marked.  Using sterile technique the superficial skin was anesthetized with 1% Lidocaine without epinephrine.  A 18 gauge Tuohoy needle was advanced under fluoroscopy using [id number] technique until ligament was engaged.  The stilette was then removed and a column of preservative free normal saline flushed through the tuohoy needle and left with a concave fluid level above the butterfly portion of the tuohoy needle.  The needle was then advanced under fluoroscopic guidance until the column of saline disappeared.  Lateral view confirmed final needle tip placement in the epidural space.  After negative aspiration for heme and CSF, 1 cc of omnipaque confirmed good cervical epiduragram.  \par \par Cervical epidurogram showed no evidence of intrathecal or intravascular flow, and good bilateral epidural flow from C3 to T2 levels.  \par \par An injectate of 3cc of preservative free normal saline plus 12 mg of betamethasone was then injected into the epidural space. The needle was subsequently removed and pressure was applied.\par \par Anesthesia personnel were present throughout the procedure.  The patient tolerated the procedure well and was instructed to contact me immediately if there were any problems.\par \par \par Rosales Roca M.D.\par

## 2023-05-26 PROBLEM — M77.32 CALCANEAL SPUR OF LEFT FOOT: Status: ACTIVE | Noted: 2023-05-25

## 2023-05-26 NOTE — PROCEDURE
[FreeTextEntry1] : X-rays taken to evaluate for possible spur or fracture.\par X-ray Report: (Left foot - 2 views) X-rays demonstrate flatfoot with large inferior calcaneal spur with no fracture

## 2023-05-26 NOTE — ASSESSMENT
[FreeTextEntry1] : \par Impression: Plantar fasciitis. Heel spur syndrome, left foot.\par \par Treatment: The patient consented. I prepped the area with alcohol. I injected 1 1/2cc's of a combination of Marcaine and Kenalog-40. I put a strapping on that he will leave on until tomorrow. He will take Meloxicam once a day with food for 10 days. I discussed more of a stability shoe then the flexible shoes he has. I wrote for pain cream. I gave him stretching exercises. He will follow-up with continued pain that persists.

## 2023-05-26 NOTE — HISTORY OF PRESENT ILLNESS
[Sneakers] : sav [FreeTextEntry1] : Patient presents today for left heel pain. It has been going on for at least a month and it is getting worse. It is about an 8/10. He has post-static dyskinesia. Pain in the morning. He denies any trauma.

## 2023-06-05 ENCOUNTER — APPOINTMENT (OUTPATIENT)
Dept: ORTHOPEDIC SURGERY | Facility: CLINIC | Age: 58
End: 2023-06-05
Payer: COMMERCIAL

## 2023-06-05 VITALS — HEIGHT: 69 IN | BODY MASS INDEX: 31.1 KG/M2 | WEIGHT: 210 LBS

## 2023-06-05 DIAGNOSIS — Z98.890 OTHER SPECIFIED POSTPROCEDURAL STATES: ICD-10-CM

## 2023-06-05 PROCEDURE — 73030 X-RAY EXAM OF SHOULDER: CPT | Mod: 50

## 2023-06-05 PROCEDURE — J3490M: CUSTOM

## 2023-06-05 PROCEDURE — 99215 OFFICE O/P EST HI 40 MIN: CPT | Mod: 25

## 2023-06-05 PROCEDURE — 20611 DRAIN/INJ JOINT/BURSA W/US: CPT | Mod: 50

## 2023-06-05 PROCEDURE — 72040 X-RAY EXAM NECK SPINE 2-3 VW: CPT

## 2023-06-05 NOTE — IMAGING
[Straightening consistent with spasm] : Straightening consistent with spasm [Disc space narrowing] : Disc space narrowing [Bilateral] : shoulder bilaterally [There are no fractures, subluxations or dislocations. No significant abnormalities are seen] : There are no fractures, subluxations or dislocations. No significant abnormalities are seen

## 2023-06-07 ENCOUNTER — APPOINTMENT (OUTPATIENT)
Dept: PODIATRY | Facility: CLINIC | Age: 58
End: 2023-06-07
Payer: COMMERCIAL

## 2023-06-07 ENCOUNTER — APPOINTMENT (OUTPATIENT)
Dept: PAIN MANAGEMENT | Facility: CLINIC | Age: 58
End: 2023-06-07
Payer: COMMERCIAL

## 2023-06-07 DIAGNOSIS — M72.2 PLANTAR FASCIAL FIBROMATOSIS: ICD-10-CM

## 2023-06-07 DIAGNOSIS — M77.52 OTHER ENTHESOPATHY OF LT FOOT AND ANKLE: ICD-10-CM

## 2023-06-07 PROCEDURE — 62323 NJX INTERLAMINAR LMBR/SAC: CPT

## 2023-06-07 PROCEDURE — 99212 OFFICE O/P EST SF 10 MIN: CPT | Mod: 25

## 2023-06-07 PROCEDURE — 20550 NJX 1 TENDON SHEATH/LIGAMENT: CPT | Mod: LT

## 2023-06-07 RX ORDER — DICLOFENAC SODIUM 1% 10 MG/G
1 GEL TOPICAL DAILY
Qty: 1 | Refills: 0 | Status: ACTIVE | COMMUNITY
Start: 2023-06-07 | End: 1900-01-01

## 2023-06-07 NOTE — PROCEDURE
[FreeTextEntry3] : Date of Service: 06/07/2023 \par \par Account: [id number]\par \par Patient: SVITLANA CAMPBELL \par \par YOB: 1965\par \par Age: 57 year\par \par \par Surgeon:                                   Rosales Roca M.D.\par \par Pre-Operative Diagnosis:      Lumbosacral radiculitis                \par \par Post Operative Diagnosis:     Lumbosacral radiculitis                \par \par Procedure:                               Interlaminar lumbar epidural steroid injection (L5-S1) under fluoroscopic guidance\par \par Anesthesia:                              MAC\par \par \par This procedure was carried out using fluoroscopic guidance.  The risks and benefits of the procedure were discussed extensively with the patient. The consent of the patient was obtained and the following procedure was performed.\par \par The patient was placed in the prone position.  The lumbar area was prepped and draped in a sterile fashion.  Under AP view with slight cephalad-caudad angulation, the L5-S1 interspace was identified and marked.  Using sterile technique the superficial skin was anesthetized with 1% Lidocaine without epinephrine.  A 20 gauge Tuohoy needle was advanced into the epidural space under fluoroscopy using [id number] technique and using loss of resistance at the L5-S1 level.  After negative aspiration for heme or CSF, an epidurogram was obtained using 3 cc Omnipaque contrast confirming epidural placement of the needle.  \par \par Epidurogram showed no evidence of intrathecal or intravascular flow, and good evidence of bilateral epidural flow from L3-S2 levels.  After this, 5 cc of preservative free normal saline and 80 mg of kenalog were injected into the epidural space.\par \par The needle was subsequently removed.  Anesthesia personnel were present throughout the procedure.\par \par The patient tolerated the procedure well and was instructed to contact me immediately if there were any problems.\par \par \par Rosales Roca M.D.\par

## 2023-06-07 NOTE — DISCUSSION/SUMMARY
[Medication Risks Reviewed] : Medication risks reviewed [Surgical risks reviewed] : Surgical risks reviewed [de-identified] : \par The patient reports ongoing bilateral shoulder pain s/p bilateral rotator cuff repairs in 2020 with associated radicular symptoms and paresthesia to the UE. \par \par Discussed shoulder pain vs. radicular pain, recommended injecting b/l shoulders for pain relief as well as diagnostic to see what symptoms are relieved. Also recommended f/up with spine specialist for further eval of the cervical spine\par \par Recommend injecting b/l shoulders today with steroid to help relieve pain and inflammation temporarily.\par The risks, benefits and contents of the injection have been discussed. Risks include but are not limited to allergic reaction, flare reaction, permanent white skin discoloration at the injection site and infection.  The patient understands the risks and agrees to having the injection.  All questions have been answered.\par Discussed the timing of the injections and the follow up that is needed. Advised the pt to ice the area that was injected and informed the pt it may take a few days for the injection to give relief. \par already had surgery , discussed high risks of future surgery and worsening outcomes with revision surgery, discussed most of the pain is coming form neck and high risks and morbidity of spine surgery as well as epidurals, deg changes on xray\par The risks and benefits of surgery have been discussed. Risks include but are not limited to bleeding, infection, reaction to anesthesia, injury to blood vessels and nerves, malunion, nonunion, DVT, PE, necessity of repeat surgery, chronic pain, loss of limb and death. The patient understands the risks and has chosen to proceed with conservative care. All questions have been answered.\par 50 mns face to face

## 2023-06-07 NOTE — PROCEDURE
[FreeTextEntry3] : Large joint injection was performed of b/l shoulders. An injection of Bupivacaine (Marcaine) 9cc of 0.5%  was used Betamethasone (Celestone) 2cc of 6mg. \par Patient was advised to call if redness, pain or fever occur and apply ice for 15 minutes out of every hour for the next 12-24 hours as tolerated. \par \par Patient has tried OTC's including aspirin, Ibuprofen, Aleve, etc or prescription NSAIDS, and/or exercises at home and/or physical therapy without satisfactory response and the risks benefits, and alternatives have been discussed, and verbal consent was obtained. \par Ultrasound guidance was indicated for this patient due to precise injection in area of tear. All ultrasound images have been permanently captured and stored accordingly in our picture archiving and communication system. Visualization of the needle and placement of injection was performed without complication. \par The findings showed no evidence of ligament, tendon or muscle tear and no effusion or other fluid collection.

## 2023-06-07 NOTE — HISTORY OF PRESENT ILLNESS
[de-identified] : Patient is here to follow up on bilateral shoulders. Had RTC repair for right on 9/21/20 and left on 6/1/2020. Recently was lifting, and noticed burning pain post session. Left is worse than right. Worsens with certain movements, as ROM is limited. Also c/o of numbness in the UEs.

## 2023-06-08 ENCOUNTER — APPOINTMENT (OUTPATIENT)
Dept: PAIN MANAGEMENT | Facility: CLINIC | Age: 58
End: 2023-06-08

## 2023-06-09 PROBLEM — M72.2 PLANTAR FASCIITIS: Status: ACTIVE | Noted: 2023-05-25

## 2023-06-12 ENCOUNTER — APPOINTMENT (OUTPATIENT)
Dept: PAIN MANAGEMENT | Facility: CLINIC | Age: 58
End: 2023-06-12
Payer: COMMERCIAL

## 2023-06-12 VITALS — BODY MASS INDEX: 31.1 KG/M2 | HEIGHT: 69 IN | WEIGHT: 210 LBS

## 2023-06-12 PROBLEM — M77.52 LEFT CALCANEAL BURSITIS: Status: ACTIVE | Noted: 2023-06-09

## 2023-06-12 PROCEDURE — 20552 NJX 1/MLT TRIGGER POINT 1/2: CPT

## 2023-06-12 PROCEDURE — J3490M: CUSTOM

## 2023-06-12 PROCEDURE — 99214 OFFICE O/P EST MOD 30 MIN: CPT | Mod: 25

## 2023-06-12 NOTE — DISCUSSION/SUMMARY
[de-identified] : Pt. presents with 70% Relief of pain and improvement in ROM and ADLS post injection.  Able to  sleep for longer periods of time.  \par \par will repeat paulina\par

## 2023-06-12 NOTE — ASSESSMENT
[FreeTextEntry1] : \par Impression: Plantar fasciitis. Bursitis, left foot.\par \par Treatment:  I discussed trying one more injection. He consented and wanted to proceed. Discussed complications and possible partial tear. I injected him under strict sterile technique after prepping with alcohol and using Ethyl Chloride with 1 1/2cc's of a combination of Xylocaine and Kenalog-40. I put a strapping on. I put him on Meloxicam. I also wrote for Voltaren gel to use once the Meloxicam is finished. He is going to ice and stretch. I want him to switch to [employer] sneakers and get a Powerstep arch support. I wrote a prescription for physical therapy. He will take the Meloxicam with food. Follow-up in the office only with continued pain that persists. He was asking about surgery which is something I would strongly advise to avoid.

## 2023-06-12 NOTE — PHYSICAL EXAM

## 2023-06-12 NOTE — HISTORY OF PRESENT ILLNESS
[Neck] : neck [Radiating] : radiating [Constant] : constant [Injection therapy] : injection therapy [Nothing helps with pain getting better] : Nothing helps with pain getting better [8] : 8 [7] : 7 [Tingling] : tingling [FreeTextEntry1] : pt is following up after procedure states it did not help  [] : no [FreeTextEntry6] : headaches , numbness  [FreeTextEntry7] : b/l sides  [FreeTextEntry9] : TPI  [de-identified] : driving

## 2023-06-12 NOTE — PHYSICAL EXAM
[2+] : left foot dorsalis pedis 2+ [Skin Color & Pigmentation] : normal skin color and pigmentation [Skin Turgor] : normal skin turgor [] : no rash [Skin Lesions] : no skin lesions [Sensation] : the sensory exam was normal to light touch and pinprick [No Focal Deficits] : no focal deficits [Deep Tendon Reflexes (DTR)] : deep tendon reflexes were 2+ and symmetric [Motor Exam] : the motor exam was normal [Ankle Swelling (On Exam)] : not present [Varicose Veins Of Lower Extremities] : not present [Delayed in the Right Toes] : capillary refills normal in right toes [Delayed in the Left Toes] : capillary refills normal in the left toes [FreeTextEntry3] : Hair growth noted on digits. Proximal to distal cooling is within normal limits.  [de-identified] : It is painful about 5 or 6/10. There is medial band insertional plantar fasciitis with inferior calcaneal bursitis. There is no pain over the tarsal tunnel. [Foot Ulcer] : no foot ulcer [Skin Induration] : no skin induration

## 2023-06-12 NOTE — REASON FOR VISIT
[Follow-Up Visit] : a follow-up pain management visit [FreeTextEntry2] : INTERLAMINAR LUMBAR EPIDURAL STEROID INJECTION L5-S1 LESI (DOS 06/07/23-MAC ) and C7-T1 MARITZA F/U

## 2023-06-12 NOTE — HISTORY OF PRESENT ILLNESS
[Sneakers] : sav [FreeTextEntry1] : Patient presents today for plantar fasciitis and heel spur syndrome, left foot. He got several days of relief and then it recurred back to where it was.

## 2023-06-12 NOTE — PROCEDURE
[Trigger point 1-2 muscle groups] : trigger point 1-2 muscle groups [Left] : of the left [Cervical paraspinal muscle] : cervical paraspinal muscle [Pain] : pain [Inflammation] : inflammation [Alcohol] : alcohol [Ethyl Chloride sprayed topically] : ethyl chloride sprayed topically [Sterile technique used] : sterile technique used [___ cc    1%] : Lidocaine ~Vcc of 1%  [___ cc    0.25%] : Bupivacaine (Marcaine) ~Vcc of 0.25%  [___ cc    10mg] : Triamcinolone (Kenalog) ~Vcc of 10 mg  [] : Patient tolerated procedure well [Call if redness, pain or fever occur] : call if redness, pain or fever occur [Risks, benefits, alternatives discussed / Verbal consent obtained] : the risks benefits, and alternatives have been discussed, and verbal consent was obtained

## 2023-06-26 ENCOUNTER — APPOINTMENT (OUTPATIENT)
Dept: PAIN MANAGEMENT | Facility: CLINIC | Age: 58
End: 2023-06-26

## 2023-06-28 NOTE — DISCUSSION/SUMMARY
[de-identified] : MARITZA with R sided >90% relief and L side with 70% relief improved ROM and ADLS\par will do tpi today\par \par  Chonodrocutaneous Helical Advancement Flap Text: Because of the tightness of the surrounding skin, the full-thickness nature of the defect with exposed cartilage, and to avoid deformity, a helical rim advancement flap was planed.  After prep and anesthesia, an incision was made in the anterior portion of the ear through the skin and the cartilage to the posterior perichondrium both superiorly and inferiorly within the scapha of the ear.  Inferiorly, this extended to the lobule where a Burow’s triangle was excised anteriorly.  The chondrocutaneous flaps were then  from the ear posteriorly at the level of the perichondrium to the postauricular sulcus.  A small rim of cartilage was also excised around the contour of the ear and after hemostasis obtained, the chondrocutaneous flaps were advanced and closed in a layered fashion

## 2023-06-29 ENCOUNTER — APPOINTMENT (OUTPATIENT)
Dept: PAIN MANAGEMENT | Facility: CLINIC | Age: 58
End: 2023-06-29

## 2023-07-17 ENCOUNTER — APPOINTMENT (OUTPATIENT)
Dept: ORTHOPEDIC SURGERY | Facility: CLINIC | Age: 58
End: 2023-07-17
Payer: COMMERCIAL

## 2023-07-17 VITALS — WEIGHT: 210 LBS | BODY MASS INDEX: 31.1 KG/M2 | HEIGHT: 69 IN

## 2023-07-17 DIAGNOSIS — E78.00 PURE HYPERCHOLESTEROLEMIA, UNSPECIFIED: ICD-10-CM

## 2023-07-17 PROCEDURE — 20552 NJX 1/MLT TRIGGER POINT 1/2: CPT | Mod: 59

## 2023-07-17 PROCEDURE — 20611 DRAIN/INJ JOINT/BURSA W/US: CPT | Mod: 50

## 2023-07-17 PROCEDURE — J3490M: CUSTOM

## 2023-07-17 PROCEDURE — 99214 OFFICE O/P EST MOD 30 MIN: CPT | Mod: 25

## 2023-07-17 RX ORDER — DICLOFENAC SODIUM 1% 10 MG/G
1 GEL TOPICAL DAILY
Qty: 1 | Refills: 2 | Status: DISCONTINUED | COMMUNITY
Start: 2023-05-24 | End: 2023-07-17

## 2023-07-17 RX ORDER — MELOXICAM 15 MG/1
15 TABLET ORAL DAILY
Qty: 10 | Refills: 1 | Status: DISCONTINUED | COMMUNITY
Start: 2023-05-24 | End: 2023-07-17

## 2023-07-17 RX ORDER — MELOXICAM 15 MG/1
15 TABLET ORAL DAILY
Qty: 14 | Refills: 1 | Status: DISCONTINUED | COMMUNITY
Start: 2023-06-07 | End: 2023-07-17

## 2023-07-17 RX ORDER — SUCRALFATE 1 G/1
TABLET ORAL
Refills: 0 | Status: ACTIVE | COMMUNITY

## 2023-07-17 RX ORDER — DICLOFENAC SODIUM 1% 10 MG/G
1 GEL TOPICAL DAILY
Qty: 1 | Refills: 0 | Status: DISCONTINUED | COMMUNITY
Start: 2022-04-13 | End: 2023-07-17

## 2023-07-17 RX ORDER — PANTOPRAZOLE 40 MG/1
TABLET, DELAYED RELEASE ORAL
Refills: 0 | Status: ACTIVE | COMMUNITY

## 2023-07-19 NOTE — HISTORY OF PRESENT ILLNESS
[de-identified] : Follow up on both knees and the lumbar spine today. Had last been seen for these issues back in April and had Zilretta injections in both and a trigger point in the lower back. Had been feeling really well until recently the pain started to come back again with the knees especially.

## 2023-07-19 NOTE — PROCEDURE
[Large Joint Injection] : Large joint injection [Trigger point 1-2 muscle groups] : trigger point 1-2 muscle groups [Bilateral] : bilaterally of the [Knee] : knee [X-ray evidence of Osteoarthritis on this or prior visit] : x-ray evidence of Osteoarthritis on this or prior visit [Alcohol] : alcohol [Betadine] : betadine [Ethyl Chloride sprayed topically] : ethyl chloride sprayed topically [Sterile technique used] : sterile technique used [___ cc    0.25%] : Bupivacaine (Marcaine) ~Vcc of 0.25%  [___ cc    32 units 5mg] : Zilretta ~Vcc of 32 units 5 mg  [Call if redness, pain or fever occur] : call if redness, pain or fever occur [Apply ice for 15min out of every hour for the next 12-24 hours as tolerated] : apply ice for 15 minutes out of every hour for the next 12-24 hours as tolerated [Patient was advised to rest the joint(s) for ____ days] : patient was advised to rest the joint(s) for [unfilled] days [Previous OTC use and PT nontherapeutic] : patient has tried OTC's including aspirin, Ibuprofen, Aleve, etc or prescription NSAIDS, and/or exercises at home and/or physical therapy without satisfactory response [Patient had decreased mobility in the joint] : patient had decreased mobility in the joint [Risks, benefits, alternatives discussed / Verbal consent obtained] : the risks benefits, and alternatives have been discussed, and verbal consent was obtained [Prior failure or difficult injection] : prior failure or difficult injection [All ultrasound images have been permanently captured and stored accordingly in our picture archiving and communication system] : All ultrasound images have been permanently captured and stored accordingly in our picture archiving and communication system [Visualization of the needle and placement of injection was performed without complication] : visualization of the needle and placement of injection was performed without complication [Pain] : pain [de-identified] : Trigger point low back 5/1

## 2023-07-19 NOTE — DISCUSSION/SUMMARY
[Medication Risks Reviewed] : Medication risks reviewed [Surgical risks reviewed] : Surgical risks reviewed [de-identified] : Discussed risks of surgical treatment and nonsurgical treatment of arthritis, discussed risks of steroid injection plus or minus viscosupplementation, risks of zilretta and benefits, role of surgery and MRI, risks and role of NSAIDs and side effects, benefits of therapy.\par Discussed risks of potential surgery. However, due to the risks of the surgery, we will try NSAIDs and therapy. Discussed management of medication.\par evaluated knee and decided to proceed with zilretta injections, discussed future treatment and option, will proceed, discussed possible need for surgery vs alternatives in future\par The risks, benefits and contents of the injection have been discussed.  Risks include but are not limited to allergic reaction, flare reaction, permanent white skin discoloration at the injection site and infection.  The patient understands the risks and agrees to having the injection.  All questions have been answered.\par Discussed risks and benefits of total knee arthroplasty vs arthroscopic surgery however, risks outweigh benefits and we will try to avoid.\par discussed risks of surgery and higher risk secondary to obesity\par will continue with maki for back but at his request with do trigger point today

## 2023-07-20 ENCOUNTER — APPOINTMENT (OUTPATIENT)
Dept: PAIN MANAGEMENT | Facility: CLINIC | Age: 58
End: 2023-07-20

## 2023-08-29 ENCOUNTER — APPOINTMENT (OUTPATIENT)
Dept: ORTHOPEDIC SURGERY | Facility: CLINIC | Age: 58
End: 2023-08-29
Payer: COMMERCIAL

## 2023-08-29 VITALS — WEIGHT: 210 LBS | HEIGHT: 69 IN | BODY MASS INDEX: 31.1 KG/M2

## 2023-08-29 DIAGNOSIS — Z98.890 OTHER SPECIFIED POSTPROCEDURAL STATES: ICD-10-CM

## 2023-08-29 PROCEDURE — 99215 OFFICE O/P EST HI 40 MIN: CPT

## 2023-08-29 RX ORDER — ROSUVASTATIN CALCIUM 5 MG/1
TABLET, FILM COATED ORAL
Refills: 0 | Status: ACTIVE | COMMUNITY

## 2023-09-01 ENCOUNTER — APPOINTMENT (OUTPATIENT)
Dept: MRI IMAGING | Facility: CLINIC | Age: 58
End: 2023-09-01

## 2023-09-01 PROBLEM — Z98.890 STATUS POST LEFT ROTATOR CUFF REPAIR: Status: ACTIVE | Noted: 2023-06-05

## 2023-09-04 NOTE — PHYSICAL EXAM
[Bilateral] : shoulder bilaterally [Mild] : mild [4 ___] : forward flexion 4[unfilled]/5 [4___] : internal rotation 4[unfilled]/5 [] : negative Speed's [de-identified] : +empty can  [[id number]] : internal rotation 40 degrees [de-identified] : external rotation 50 degrees

## 2023-09-04 NOTE — DISCUSSION/SUMMARY
[Medication Risks Reviewed] : Medication risks reviewed [Surgical risks reviewed] : Surgical risks reviewed [de-identified] :  The patient reports mild improvement from previous bilateral knee Zilretta on 02/10/22, bilateral shoulder CSI on 06/05/23, and L-spine TPI on 07/17/23 all with return of symptoms.    In regard to RIGHT SHOULDER...  Previous MRI arthrogram right shoulder (2021) reveals recurrent rotator cuff tearing.  Evident that right shoulder symptoms are radicular in nature.  Recommended MDP to aid in inflammatory process as patient is not a candidate for repeat injection today.  discussed surgery but window closing, can have re repair with biologic patch to improve chance of healing The patient is seeing Dr. Roca for back and neck. Recommended continued follow up for possible epidural injection to further alleviate shoulder symptoms.    In regard to LEFT SHOULDER...  Discussed timing of injections, the patient is not a candidate for repeat injection today.  The patient has had no post-operative follow up MRI.   Due to worsening pain and instability with mechanical symptoms, recommend the patient obtain MRI left shoulder to rule out recurrent rotator cuff tear. Follow up after MRI to possibly rule out surgical pathology and discuss future treatment options.   Prescribed patient MDP. Use as directed for the next five days.  Following which OTC NSAIDs may be used PRN for pain, inflammation, and discomfort.  No NSAID medications should be taken concurrently with the Medrol Dose Pack.   Patient declines Flexoril

## 2023-09-04 NOTE — HISTORY OF PRESENT ILLNESS
[de-identified] : Patient is here to follow up on bilateral shoulders. Celestone injection from 6/5/23 was giving relief, inquiring about repeat. Had RTC repair in 2020 for both.

## 2023-09-05 ENCOUNTER — APPOINTMENT (OUTPATIENT)
Dept: MRI IMAGING | Facility: CLINIC | Age: 58
End: 2023-09-05

## 2023-09-06 ENCOUNTER — APPOINTMENT (OUTPATIENT)
Dept: PAIN MANAGEMENT | Facility: CLINIC | Age: 58
End: 2023-09-06
Payer: COMMERCIAL

## 2023-09-06 VITALS — HEIGHT: 69 IN | WEIGHT: 210 LBS | BODY MASS INDEX: 31.1 KG/M2

## 2023-09-06 PROCEDURE — J3490M: CUSTOM

## 2023-09-06 PROCEDURE — 99214 OFFICE O/P EST MOD 30 MIN: CPT | Mod: 25

## 2023-09-06 PROCEDURE — 20552 NJX 1/MLT TRIGGER POINT 1/2: CPT

## 2023-09-06 NOTE — REASON FOR VISIT
[Follow-Up Visit] : a follow-up pain management visit [FreeTextEntry2] :  Interlaminar lumbar epidural steroid injection (L5-S1) under fluoroscopic guidance

## 2023-09-06 NOTE — HISTORY OF PRESENT ILLNESS
[Neck] : neck [Radiating] : radiating [Tingling] : tingling [Constant] : constant [Injection therapy] : injection therapy [Nothing helps with pain getting better] : Nothing helps with pain getting better [10] : 10 [Dull/Aching] : dull/aching [Work] : work [Sleep] : sleep [FreeTextEntry1] : L5-S1 LESI 6/7/23- 70% relief of pain improved rom and adls C7-T1 MARITZA 5/24/23- 70% relief of pain improved rom and adls   c/o pain in neck wrapping around chest, trap  and shoulder, aggravated with AROM flexion  [] : no [FreeTextEntry6] : numbness  [FreeTextEntry7] : back around armpit to the chest [FreeTextEntry9] : TPI  [de-identified] : driving

## 2023-09-06 NOTE — ASSESSMENT
[FreeTextEntry1] : proceed c7-t1 paulina  After discussing various treatment options with the patient including but not limited to oral medications, physical therapy, exercise, modalities as well as interventional spinal injections, we have decided with the following plan: I personally reviewed the MRI/CT scan images and agree with the radiologist's report. The radiological findings were discussed with the patient. The risks, benefits, contents and alternatives to injection were explained in full to the patient. Risks outlined include but are not limited to infection,sepsis, bleeding, post-dural puncture headache, nerve damage, temporary increase in pain, syncopal episode, failure to resolve symptoms, allergic reaction, symptom recurrence, and elevation of blood sugar in diabetics. Cortisone may cause immunosuppression. Patient understands the risks. All questions were answered. After discussion of options, patient requested an injection. Information regarding the injection was given to the patient. Which medications to stop prior to the injection was explained to the patient as well. Follow up in 1-2 weeks post injection for re-evaluation. Continue Home exercises, stretching, activity modification, physical therapy, and conservative care. Patient is presenting with acute/sub-acute radicular pain with impairment in ADLs and functionality. The pain has not responded to conservative care including nsaid therapy and/or physical therapy. There is no bleeding tendency, unstable medical condition, or systemic infection.

## 2023-09-06 NOTE — PHYSICAL EXAM
[de-identified] : PHYSICAL EXAM  Constitutional:  Appears well, no apparent distress Ability to communicate: Normal Respiratory: non-labored breathing Skin: no rash noted Head: normocephalic, atraumatic Neck: no visible thyroid enlargement Eyes: extraocular movements intact Neurologic: alert and oriented x3 Psychiatric: normal mood, affect, and behavior  Cervical: Palpation: right trapezial spasm, right trapezius tenderness and right paracervical tenderness ROM: Diminished range of motion in all plains.  Patient notes pain at extremes of extension and rotation to right.  Stiffness at extremes of extension and rotation to the right MMT: Motor exam is 5/5 through out bilateral upper extremities. Sensation: Light touch and pain is intact throughout bilateral upper extremities. Reflexes: No sustained clonus. Special Testing: Positive right Spurling test   Assessemnt: Radiculopathy of cervical region (M54.12) Cervicalgia (M54.2)   Plan: After discussing various treatment options with the patient including but not limited to oral medications, physical therapy, exercise modalities as well as interventional spinal injections, we have decided with the following plan:  MRI Review  I personally reviewed the MRI/CT scan images and agree with the radiologist's report.  The radiological findings were discussed with the patient.     .  The risks, benefits and alternatives of the proposed procedure were explained in detail with the patient.  The risks outlined include but are not limited to infection, bleeding, post dural puncture headache, nerve injury, a temporary increase in pain, failure to resolve symptoms, allergic reaction, symptom recurrence, and possible elevation of blood sugar.  All questions were answered to patient's satisfaction and he/she verbalized an understanding.  Follow up 1-2 weeks post injection for re-evaluation.  Continue home exercises, stretching, activity modification, physical therapy, and conservative care.

## 2023-09-12 ENCOUNTER — APPOINTMENT (OUTPATIENT)
Dept: ORTHOPEDIC SURGERY | Facility: CLINIC | Age: 58
End: 2023-09-12

## 2023-09-14 ENCOUNTER — APPOINTMENT (OUTPATIENT)
Dept: PAIN MANAGEMENT | Facility: CLINIC | Age: 58
End: 2023-09-14
Payer: COMMERCIAL

## 2023-09-14 PROCEDURE — 62321 NJX INTERLAMINAR CRV/THRC: CPT

## 2023-09-19 ENCOUNTER — APPOINTMENT (OUTPATIENT)
Dept: ORTHOPEDIC SURGERY | Facility: CLINIC | Age: 58
End: 2023-09-19
Payer: COMMERCIAL

## 2023-09-19 VITALS — BODY MASS INDEX: 31.1 KG/M2 | WEIGHT: 210 LBS | HEIGHT: 69 IN

## 2023-09-19 DIAGNOSIS — M77.11 LATERAL EPICONDYLITIS, RIGHT ELBOW: ICD-10-CM

## 2023-09-19 PROCEDURE — 99214 OFFICE O/P EST MOD 30 MIN: CPT | Mod: 25

## 2023-09-19 PROCEDURE — 73080 X-RAY EXAM OF ELBOW: CPT | Mod: RT

## 2023-09-19 PROCEDURE — 20551 NJX 1 TENDON ORIGIN/INSJ: CPT | Mod: RT

## 2023-09-19 PROCEDURE — 20600 DRAIN/INJ JOINT/BURSA W/O US: CPT | Mod: RT

## 2023-09-19 PROCEDURE — J3490M: CUSTOM

## 2023-09-19 PROCEDURE — 73130 X-RAY EXAM OF HAND: CPT | Mod: RT

## 2023-10-02 ENCOUNTER — APPOINTMENT (OUTPATIENT)
Dept: PAIN MANAGEMENT | Facility: CLINIC | Age: 58
End: 2023-10-02
Payer: COMMERCIAL

## 2023-10-02 VITALS — WEIGHT: 219 LBS | BODY MASS INDEX: 32.44 KG/M2 | HEIGHT: 69 IN

## 2023-10-02 PROCEDURE — J3490M: CUSTOM

## 2023-10-02 PROCEDURE — 20552 NJX 1/MLT TRIGGER POINT 1/2: CPT

## 2023-10-02 PROCEDURE — 99214 OFFICE O/P EST MOD 30 MIN: CPT | Mod: 25

## 2023-10-16 ENCOUNTER — APPOINTMENT (OUTPATIENT)
Dept: ORTHOPEDIC SURGERY | Facility: CLINIC | Age: 58
End: 2023-10-16

## 2023-12-19 ENCOUNTER — APPOINTMENT (OUTPATIENT)
Dept: ORTHOPEDIC SURGERY | Facility: CLINIC | Age: 58
End: 2023-12-19
Payer: COMMERCIAL

## 2023-12-19 PROCEDURE — 99214 OFFICE O/P EST MOD 30 MIN: CPT | Mod: 25

## 2023-12-19 PROCEDURE — J3490M: CUSTOM

## 2023-12-19 PROCEDURE — 20611 DRAIN/INJ JOINT/BURSA W/US: CPT | Mod: 50

## 2023-12-19 PROCEDURE — 20552 NJX 1/MLT TRIGGER POINT 1/2: CPT | Mod: 59

## 2023-12-27 NOTE — PROCEDURE
[Large Joint Injection] : Large joint injection [Bilateral] : bilaterally of the [Subacromial Space] : subacromial space [Trigger point 1-2 muscle groups] : trigger point 1-2 muscle groups [Lumbar paraspinal muscle] : lumbar paraspinal muscle [X-ray evidence of Osteoarthritis on this or prior visit] : x-ray evidence of Osteoarthritis on this or prior visit [Alcohol] : alcohol [Betadine] : betadine [Ethyl Chloride sprayed topically] : ethyl chloride sprayed topically [Sterile technique used] : sterile technique used [___ cc    3mg] :  Betamethasone (Celestone) ~Vcc of 3mg [___ cc    0.25%] : Bupivacaine (Marcaine) ~Vcc of 0.25%  [] : Patient tolerated procedure well [Call if redness, pain or fever occur] : call if redness, pain or fever occur [Apply ice for 15min out of every hour for the next 12-24 hours as tolerated] : apply ice for 15 minutes out of every hour for the next 12-24 hours as tolerated [Patient was advised to rest the joint(s) for ____ days] : patient was advised to rest the joint(s) for [unfilled] days [Previous OTC use and PT nontherapeutic] : patient has tried OTC's including aspirin, Ibuprofen, Aleve, etc or prescription NSAIDS, and/or exercises at home and/or physical therapy without satisfactory response [Risks, benefits, alternatives discussed / Verbal consent obtained] : the risks benefits, and alternatives have been discussed, and verbal consent was obtained [Prior failure or difficult injection] : prior failure or difficult injection [All ultrasound images have been permanently captured and stored accordingly in our picture archiving and communication system] : All ultrasound images have been permanently captured and stored accordingly in our picture archiving and communication system [Visualization of the needle and placement of injection was performed without complication] : visualization of the needle and placement of injection was performed without complication [Pain] : pain [Right] : of the right

## 2024-01-01 NOTE — HISTORY OF PRESENT ILLNESS
[de-identified] : Here to follow up on bilateral shoulders/lumbar spine. Never went for MRI for left. Right shoulder is worse than left. Lumbar spine last trigger point was on 7/17/23. Last Celestone for bilateral shoulders was on 6/5/23. Inquiring about repeat. Has been seeing Chanelle, and last epidural was 7 months ago. Tried Medrol with no relief.

## 2024-01-01 NOTE — PHYSICAL EXAM
[Bilateral] : shoulder bilaterally [Mild] : mild [4 ___] : forward flexion 4[unfilled]/5 [4___] : internal rotation 4[unfilled]/5 [] : negative Speed's [de-identified] : +empty can  [[id number]] : internal rotation 40 degrees [de-identified] : external rotation 50 degrees

## 2024-01-01 NOTE — DISCUSSION/SUMMARY
[Medication Risks Reviewed] : Medication risks reviewed [Surgical risks reviewed] : Surgical risks reviewed [de-identified] : The patient reports mild improvement from previous bilateral knee Zilretta on 02/10/22, bilateral shoulder CSI on 06/05/23, and L-spine TPI on 07/17/23 all with return of symptoms.    Previous MRI arthrogram right shoulder (2021) reveals recurrent rotator cuff tearing.  Evident that right shoulder symptoms are radicular in nature.  discussed surgery but window closing, can have re repair with biologic patch to improve chance of healing. Pt has deferred and would like to continue with injection therapy The patient is seeing Dr. Roca for back and neck. Recommended continued follow up for possible epidural injection to further alleviate shoulder symptoms.     Discussed treatment options in the form of injection therapy. Patient elected to receive B/L shoulder  CSI  under ultrasound guidance. Advised patient to rest and ice the area.    Discussed treatment options in the form of injection therapy. Patient elected to receive TPI lumbar paraspinal CSI 5/1  under ultrasound guidance. Advised patient to rest and ice the area.    The risks, benefits and contents of the injection have been discussed. Risks include but are not limited to allergic reaction, flare reaction, permanent white skin discoloration at the injection site and infection.  The patient understands the risks and agrees to having the injection.  All questions have been answered.   Discussed the timing of the injections and the follow up that is needed. Advised the patient to ice the area that was injected and that it may take a few days before experiencing relief.  Prescribed patient Meloxicam 15mg daily and discussed risks of side effects, as well as timing/management of medication.  Side effects can include but are not limited to gastrointestinal ulcers and irritation, kidney failure, and bleeding issues. Use as directed and take with food to manage pain, inflammation, and discomfort.  f/u in 4 weeks if pain persist or worsens   Jennifer GONG, attest that this documentation has been prepared under the direction and in the presence of Provider Dr. Carmine Oneill

## 2024-01-04 ENCOUNTER — APPOINTMENT (OUTPATIENT)
Dept: PAIN MANAGEMENT | Facility: CLINIC | Age: 59
End: 2024-01-04
Payer: COMMERCIAL

## 2024-01-04 ENCOUNTER — APPOINTMENT (OUTPATIENT)
Dept: PAIN MANAGEMENT | Facility: CLINIC | Age: 59
End: 2024-01-04

## 2024-01-04 DIAGNOSIS — M54.12 RADICULOPATHY, CERVICAL REGION: ICD-10-CM

## 2024-01-04 PROCEDURE — 62321 NJX INTERLAMINAR CRV/THRC: CPT

## 2024-01-04 NOTE — PROCEDURE
[FreeTextEntry3] : Date of Service: 01/04/2024   Account: [id number]  Patient: SVITLANA CAMPBELL   YOB: 1965  Age: 58 year   Surgeon: Rosales Roca M.D.  Pre-Operative Diagnosis: Cervical Radiculopathy  Post Operative Diagnosis: Cervical Radiculopathy  Procedure: Interlaminar cervical epidural steroid injection (C7-T1) under fluoroscopic guidance      This procedure was carried out using fluoroscopic guidance.  The risks and benefits of the procedure were discussed extensively with the patient.  The consent of the patient was obtained and the following procedure was performed.  The patient was placed in the prone position using a thoracic and chin support.  The cervical area was prepped and draped in a sterile fashion.  The fluoroscope visualized the C7-T1 interspace using slight cephalad-caudad angulation and this area was marked.  Using sterile technique the superficial skin was anesthetized with 1% Lidocaine without epinephrine.  A 18 gauge Tuohoy needle was advanced under fluoroscopy using sdmvz-[id number]-quqdo technique until ligament was engaged.  The stilette was then removed and a column of preservative free normal saline flushed through the tuohoy needle and left with a concave fluid level above the butterfly portion of the tuohoy needle.  The needle was then advanced under fluoroscopic guidance until the column of saline disappeared.  Lateral view confirmed final needle tip placement in the epidural space.  After negative aspiration for heme and CSF, 1 cc of omnipaque confirmed good cervical epiduragram.    Cervical epidurogram showed no evidence of intrathecal or intravascular flow, and good bilateral epidural flow from C3 to T2 levels.    An injectate of 3cc of preservative free normal saline plus 12 mg of betamethasone was then injected into the epidural space. The needle was subsequently removed and pressure was applied.    The patient tolerated the procedure well and was instructed to contact me immediately if there were any problems.   Rosales Roca M.D.

## 2024-01-11 ENCOUNTER — APPOINTMENT (OUTPATIENT)
Dept: PAIN MANAGEMENT | Facility: CLINIC | Age: 59
End: 2024-01-11

## 2024-01-18 ENCOUNTER — APPOINTMENT (OUTPATIENT)
Dept: PAIN MANAGEMENT | Facility: CLINIC | Age: 59
End: 2024-01-18
Payer: COMMERCIAL

## 2024-01-18 DIAGNOSIS — Z98.890 OTHER SPECIFIED POSTPROCEDURAL STATES: ICD-10-CM

## 2024-01-18 PROCEDURE — 62323 NJX INTERLAMINAR LMBR/SAC: CPT

## 2024-01-18 NOTE — PROCEDURE
[FreeTextEntry3] : Date of Service: 01/18/2024   Account: [id number]  Patient: SVITLANA CAMPBELL   YOB: 1965  Age: 58 year   Surgeon:                                   Rosales Roca M.D.  Pre-Operative Diagnosis:      Lumbosacral radiculitis                  Post Operative Diagnosis:     Lumbosacral radiculitis                  Procedure:                               Interlaminar lumbar epidural steroid injection (L5-S1) under fluoroscopic guidance     This procedure was carried out using fluoroscopic guidance.  The risks and benefits of the procedure were discussed extensively with the patient. The consent of the patient was obtained and the following procedure was performed.  The patient was placed in the prone position.  The lumbar area was prepped and draped in a sterile fashion.  Under AP view with slight cephalad-caudad angulation, the L5-S1 interspace was identified and marked.  Using sterile technique the superficial skin was anesthetized with 1% Lidocaine without epinephrine.  A 20 gauge Tuohoy needle was advanced into the epidural space under fluoroscopy using yhebi-dbfxehmdc-epzcv technique and using loss of resistance at the L5-S1 level.  After negative aspiration for heme or CSF, an epidurogram was obtained using 3 cc Omnipaque contrast confirming epidural placement of the needle.    Epidurogram showed no evidence of intrathecal or intravascular flow, and good evidence of bilateral epidural flow from L3-S2 levels.  After this, 5 cc of preservative free normal saline and 80 mg of kenalog were injected into the epidural space.  The needle was subsequently removed.    The patient tolerated the procedure well and was instructed to contact me immediately if there were any problems.   Rosales Roca M.D.

## 2024-01-25 ENCOUNTER — APPOINTMENT (OUTPATIENT)
Dept: PAIN MANAGEMENT | Facility: CLINIC | Age: 59
End: 2024-01-25
Payer: COMMERCIAL

## 2024-01-25 VITALS — WEIGHT: 219 LBS | BODY MASS INDEX: 32.44 KG/M2 | HEIGHT: 69 IN

## 2024-01-25 DIAGNOSIS — M79.18 MYALGIA, OTHER SITE: ICD-10-CM

## 2024-01-25 DIAGNOSIS — M54.50 LOW BACK PAIN, UNSPECIFIED: ICD-10-CM

## 2024-01-25 DIAGNOSIS — M54.2 CERVICALGIA: ICD-10-CM

## 2024-01-25 PROCEDURE — 99214 OFFICE O/P EST MOD 30 MIN: CPT | Mod: 25

## 2024-01-25 PROCEDURE — J3490M: CUSTOM

## 2024-01-25 PROCEDURE — 20553 NJX 1/MLT TRIGGER POINTS 3/>: CPT

## 2024-01-25 RX ORDER — ACETAMINOPHEN 650 MG/1
650 TABLET, EXTENDED RELEASE ORAL
Qty: 90 | Refills: 0 | Status: ACTIVE | COMMUNITY
Start: 2024-01-25 | End: 1900-01-01

## 2024-02-09 NOTE — PATIENT PROFILE ADULT. - DOES PATIENT HAVE ADVANCE DIRECTIVE
Caller: Elysia Romero    Relationship: Self    Best call back number: [phone]    Which medication are you concerned about: SYNTHROID    Who prescribed you this medication: DR. PARK WINTER    When did you start taking this medication: YEARS    What are your concerns: DR. PARK WINTER- PT IS SUPPOSE TO BE PICKING UP SYNTHROID TODAY BUT WOULD LIKE TO FIND OUT IF THE DOSAGE WILL REMAIN THE SAME OR WILL IT BE INCREASED?     How long have you had these concerns: DOSAGE CHANGE        
Spoke with patient and reviewed lab letter from 02/02/2024.  
No

## 2024-03-06 ENCOUNTER — APPOINTMENT (OUTPATIENT)
Dept: ORTHOPEDIC SURGERY | Facility: CLINIC | Age: 59
End: 2024-03-06
Payer: COMMERCIAL

## 2024-03-06 VITALS — HEIGHT: 69 IN | BODY MASS INDEX: 32.44 KG/M2 | WEIGHT: 219 LBS

## 2024-03-06 DIAGNOSIS — M19.012 PRIMARY OSTEOARTHRITIS, LEFT SHOULDER: ICD-10-CM

## 2024-03-06 DIAGNOSIS — M19.011 PRIMARY OSTEOARTHRITIS, RIGHT SHOULDER: ICD-10-CM

## 2024-03-06 PROCEDURE — 73564 X-RAY EXAM KNEE 4 OR MORE: CPT | Mod: 50

## 2024-03-06 PROCEDURE — J3490M: CUSTOM

## 2024-03-06 PROCEDURE — 20611 DRAIN/INJ JOINT/BURSA W/US: CPT | Mod: 50

## 2024-03-06 PROCEDURE — 99214 OFFICE O/P EST MOD 30 MIN: CPT | Mod: 25

## 2024-03-06 NOTE — PROCEDURE
[Large Joint Injection] : Large joint injection [Bilateral] : bilaterally of the [Knee] : knee [Inflammation] : inflammation [Alcohol] : alcohol [Betadine] : betadine [Sterile technique used] : sterile technique used [Call if redness, pain or fever occur] : call if redness, pain or fever occur [Apply ice for 15min out of every hour for the next 12-24 hours as tolerated] : apply ice for 15 minutes out of every hour for the next 12-24 hours as tolerated [Patient was advised to rest the joint(s) for ____ days] : patient was advised to rest the joint(s) for [unfilled] days [Risks, benefits, alternatives discussed / Verbal consent obtained] : the risks benefits, and alternatives have been discussed, and verbal consent was obtained [All ultrasound images have been permanently captured and stored accordingly in our picture archiving and communication system] : All ultrasound images have been permanently captured and stored accordingly in our picture archiving and communication system [Visualization of the needle and placement of injection was performed without complication] : visualization of the needle and placement of injection was performed without complication [Pain] : pain [___ cc    3mg] :  Betamethasone (Celestone) ~Vcc of 3mg [___ cc    0.25%] : Bupivacaine (Marcaine) ~Vcc of 0.25%  [Prior failure or difficult injection] : prior failure or difficult injection

## 2024-03-07 ENCOUNTER — APPOINTMENT (OUTPATIENT)
Dept: ORTHOPEDIC SURGERY | Facility: CLINIC | Age: 59
End: 2024-03-07
Payer: COMMERCIAL

## 2024-03-07 VITALS — WEIGHT: 219 LBS | HEIGHT: 69 IN | BODY MASS INDEX: 32.44 KG/M2

## 2024-03-07 DIAGNOSIS — G56.02 CARPAL TUNNEL SYNDROME, LEFT UPPER LIMB: ICD-10-CM

## 2024-03-07 DIAGNOSIS — G56.01 CARPAL TUNNEL SYNDROME, RIGHT UPPER LIMB: ICD-10-CM

## 2024-03-07 DIAGNOSIS — Z78.9 OTHER SPECIFIED HEALTH STATUS: ICD-10-CM

## 2024-03-07 DIAGNOSIS — M77.8 OTHER ENTHESOPATHIES, NOT ELSEWHERE CLASSIFIED: ICD-10-CM

## 2024-03-07 PROCEDURE — 99213 OFFICE O/P EST LOW 20 MIN: CPT | Mod: 25

## 2024-03-07 PROCEDURE — 20526 THER INJECTION CARP TUNNEL: CPT | Mod: LT

## 2024-03-07 PROCEDURE — J3490M: CUSTOM

## 2024-03-07 PROCEDURE — 20600 DRAIN/INJ JOINT/BURSA W/O US: CPT | Mod: RT

## 2024-03-07 NOTE — DISCUSSION/SUMMARY
[de-identified] : R 2nd and 3rd MCP Small joint injection was performed because of pain inflammation and stiffness Anesthesia: ethyl chloride sprayed topically Celestone 6mg: An injection of Celestone 1cc Marcaine: An injection of Marcaine 0.5% 1cc x2  Patient has tried OTC's including aspirin, Ibuprofen, Aleve etc or prescription NSAIDS, and/or exercises at home and/ or physical therapy without satisfactory response. After verbal consent using sterile preparation and technique. The risks, benefits, and alternatives to cortisone injection were explained in full to the patient. Risks outlined include but are not limited to infection, sepsis, bleeding, scarring, skin discoloration, temporary increase in pain, syncopal episode, failure to resolve symptoms, allergic reaction, symptom recurrence, and elevation of blood sugar in diabetics. Patient understood the risks. All questions were answered. After discussion of options, patient requested an injection. Oral informed consent was obtained and sterile prep was done of the injection site. Sterile technique was utilized for the procedure including the preparation of the solutions used for the injection. Patient tolerated the procedure well. Advised to ice the injection site this evening. Prep with betadine locally to site. Sterile technique used

## 2024-03-07 NOTE — PHYSICAL EXAM
[de-identified] : R elbow: mild swelling Tender lateral epicondyle Pain with ROM Pain with forced wrist extension  R hand Swelling dorsal MCPs Tender stiffness No instability  Xrays 2nd MCP OA

## 2024-03-07 NOTE — HISTORY OF PRESENT ILLNESS
[Right Arm] : right arm [4] : 4 [Dull/Aching] : dull/aching [de-identified] : R hand 2nd and 3rd MCP pain and swellig Injection in Sept helped until now  Now L hand numkbness- worse at night/morning   [FreeTextEntry1] : hands [de-identified] : inj pt

## 2024-03-07 NOTE — ASSESSMENT
[FreeTextEntry1] : I disucssed adn rec L CTR He wants to proceed with injection  Understands risk of waiting for surgery including permanant nerve damage L Carpal tunnel injection was performed because of pain inflammation Anesthesia: ethyl chloride sprayed topically Celestone 6mg: An injection of Celestone 1cc Lidocaine: An injection of Lidocaine 1% 1cc Marcaine: An injection of Marcaine 0.5% 1cc  Patient has tried OTC's including aspirin, Ibuprofen, Aleve etc or prescription NSAIDS, and/or exercises at home and/ or physical therapy without satisfactory response. After verbal consent using sterile preparation and technique. The risks, benefits, and alternatives to cortisone injection were explained in full to the patient. Risks outlined include but are not limited to infection, sepsis, bleeding, scarring, skin discoloration, temporary increase in pain, syncopal episode, failure to resolve symptoms, allergic reaction, symptom recurrence, and elevation of blood sugar in diabetics. Patient understood the risks. All questions were answered. After discussion of options, patient requested an injection. Oral informed consent was obtained and sterile prep was done of the injection site. Sterile technique was utilized for the procedure including the preparation of the solutions used for the injection. Patient tolerated the procedure well. Advised to ice the injection site this evening. Prep with betadine locally to site. Sterile technique used

## 2024-03-11 PROBLEM — M19.011 ARTHRITIS OF SHOULDER REGION, RIGHT: Status: ACTIVE | Noted: 2023-09-04

## 2024-03-11 PROBLEM — M19.012 ARTHRITIS OF SHOULDER REGION, LEFT: Status: ACTIVE | Noted: 2023-09-04

## 2024-03-11 NOTE — DISCUSSION/SUMMARY
[Medication Risks Reviewed] : Medication risks reviewed [Surgical risks reviewed] : Surgical risks reviewed [de-identified] : X-ray of B/L knees revealed evidence of patellar bone spurs, arthritis   Discussed risks of surgical treatment and nonsurgical treatment of arthritis, discussed risks of steroid injection plus or minus visco supplementation, risks of zilretta and benefits, role of surgery and MRI, risks and role of NSAIDs and side effects, benefits of therapy.    Due to worsening pain and instability with mechanical symptoms, recommend the patient obtain MRI of B/L knees to rule out MMT. Follow up after MRI to possibly rule out surgical pathology and discuss future treatment options.  Discussed treatment options in the form of injection therapy. Patient elected to receive 9/2 BL knees under ultrasound guidance. Advised patient to rest and ice the area.   The risks, benefits and contents of the injection have been discussed. Risks include but are not limited to allergic reaction, flare reaction, permanent white skin discoloration at the injection site and infection.  The patient understands the risks and agrees to having the injection.  All questions have been answered.   Discussed the timing of the injections and the follow up that is needed. Advised the patient to ice the area that was injected and that it may take a few days before experiencing relief.    Prescribed the patient Motrin 600mgs and discussed risks of side effects and timing and management of medication. Side effects include but are not limited to gi ulcers and irritation, as well as kidney failure and bleeding issues.  Follow up after MRI

## 2024-03-11 NOTE — HISTORY OF PRESENT ILLNESS
[de-identified] : Here for follow up on the both knees today. Had last been seen for the knees in July and had celestone injections at that time but only had relief for a couple months. Having increased pain with sitting mostly. Exercise is ok

## 2024-03-11 NOTE — PHYSICAL EXAM
[NL (0)] : extension 0 degrees [Bilateral] : knee bilaterally [Positive] : positive Cayla [] : no erythema [FreeTextEntry9] : patellar bone spurs [TWNoteComboBox7] : flexion 135 degrees

## 2024-03-27 ENCOUNTER — APPOINTMENT (OUTPATIENT)
Dept: ORTHOPEDIC SURGERY | Facility: CLINIC | Age: 59
End: 2024-03-27

## 2024-04-03 ENCOUNTER — APPOINTMENT (OUTPATIENT)
Dept: MRI IMAGING | Facility: CLINIC | Age: 59
End: 2024-04-03
Payer: COMMERCIAL

## 2024-04-03 PROCEDURE — 73721 MRI JNT OF LWR EXTRE W/O DYE: CPT | Mod: RT

## 2024-04-03 NOTE — ED ADULT NURSE NOTE - NS PRO PASSIVE SMOKE EXP
Giovanni Flaherty (:  1948) is a 75 y.o. male,Established patient, here for evaluation of the following chief complaint(s):  Hypertension (Patient is fasting) and Arthritis (Arthritis in the left hand; per patient wants to discuss a bump on his wrist )         ASSESSMENT/PLAN:  1. HTN, goal below 130/80-well controlled on meds  2. SSS (sick sinus syndrome) (HCC)-sp pacer   3. Cryptogenic stroke (HCC)  4. Dyslipidemia, goal LDL below 70-ldl at 80 currently change statin as discussed  -     Comprehensive Metabolic Panel; Future  -     Lipid Panel; Future  -     rosuvastatin (CRESTOR) 10 MG tablet; Take 1 tablet by mouth daily, Disp-90 tablet, R-1Normal  5. Synovial cyst of wrist, left-discussed if growing or causing inc pain or interferes with function would advise seeing ortho again to discuss resection      No follow-ups on file.         Subjective   SUBJECTIVE/OBJECTIVE:  Hypertension    Arthritis      Came back from time in [place].  He feels well.  Denies any new dizzy spells new cardiac symptoms new dyspnea on exertion or edema.    Hypertension currently on irbesartan 300 amlodipine 10 mg blood pressure looks good and is not having side effects from the meds.    Dyslipidemia has been on pravastatin 80 mg last LDL was 82 he has had trouble with atorvastatin in the past however we discussed that goal LDL is below 70 given his history of stroke and I do like to try a more potent statin will switch from the pravastatin to rosuvastatin 10 mg with plans for labs in 2 months.    History of arthritis of left wrist per orthopedist notes that he is recently seeing a cyst in that area.  It is not causing trouble with range of motion or significant pain.  Rates it as a 2 out of 10 discomfort.  Review of Systems   Musculoskeletal:  Positive for arthritis.          Objective   Physical Exam  Constitutional:       Appearance: Normal appearance.   HENT:      Head: Normocephalic and atraumatic.   Cardiovascular:       Unknown

## 2024-04-16 ENCOUNTER — APPOINTMENT (OUTPATIENT)
Dept: ORTHOPEDIC SURGERY | Facility: CLINIC | Age: 59
End: 2024-04-16

## 2024-05-29 ENCOUNTER — APPOINTMENT (OUTPATIENT)
Dept: PAIN MANAGEMENT | Facility: CLINIC | Age: 59
End: 2024-05-29
Payer: COMMERCIAL

## 2024-05-29 VITALS — BODY MASS INDEX: 30.36 KG/M2 | WEIGHT: 205 LBS | HEIGHT: 69 IN

## 2024-05-29 PROCEDURE — 20553 NJX 1/MLT TRIGGER POINTS 3/>: CPT

## 2024-05-29 PROCEDURE — J3490M: CUSTOM

## 2024-05-29 PROCEDURE — 99214 OFFICE O/P EST MOD 30 MIN: CPT | Mod: 25

## 2024-05-29 RX ORDER — IBUPROFEN 800 MG/1
800 TABLET, FILM COATED ORAL
Qty: 60 | Refills: 0 | Status: ACTIVE | COMMUNITY
Start: 2024-05-29 | End: 1900-01-01

## 2024-05-29 RX ORDER — METHYLPREDNISOLONE 4 MG/1
4 TABLET ORAL
Qty: 1 | Refills: 0 | Status: ACTIVE | COMMUNITY
Start: 2023-08-29 | End: 1900-01-01

## 2024-05-29 NOTE — PHYSICAL EXAM

## 2024-05-29 NOTE — PROCEDURE
[Trigger point 3 or more muscle groups] : Trigger point 3 or more muscle groups [Lumbar paraspinal muscle] : lumbar paraspinal muscle [Cervical paraspinal muscle] : cervical paraspinal muscle [Thoracic paraspinal muscle] : thoracic paraspinal muscle [Other: ____] : [unfilled] [Pain] : pain [Inflammation] : inflammation [Alcohol] : alcohol [Ethyl Chloride sprayed topically] : ethyl chloride sprayed topically [Sterile technique used] : sterile technique used [___ cc    1%] : Lidocaine ~Vcc of 1%  [___ cc    0.25%] : Bupivacaine (Marcaine) ~Vcc of 0.25%  [___ cc    10mg] : Triamcinolone (Kenalog) ~Vcc of 10 mg  [] : Patient tolerated procedure well [Call if redness, pain or fever occur] : call if redness, pain or fever occur

## 2024-05-29 NOTE — HISTORY OF PRESENT ILLNESS
[Neck] : neck [9] : 9 [Dull/Aching] : dull/aching [Radiating] : radiating [Sharp] : sharp [Shooting] : shooting [Tingling] : tingling [Constant] : constant [Household chores] : household chores [Work] : work [Sleep] : sleep [Social interactions] : social interactions [Injection therapy] : injection therapy [Nothing helps with pain getting better] : Nothing helps with pain getting better [Sitting] : sitting [FreeTextEntry1] : pt presents lspine pain radiating down lt leg   [] : no [FreeTextEntry7] : back around armpit to the chest, lt leg [FreeTextEntry9] : TPI  [de-identified] : driving

## 2024-05-29 NOTE — DISCUSSION/SUMMARY
[de-identified] : Patient is presenting with acute/sub-acute radicular pain with impairment in ADLs and functionality.  The pain has not responded to  conservative care including nsaid therapy and/or physical therapy.  There is no bleeding tendency, unstable medical condition, or systemic infection.   new left sided radiculitis   MRI ls spine

## 2024-06-05 ENCOUNTER — APPOINTMENT (OUTPATIENT)
Dept: ORTHOPEDIC SURGERY | Facility: CLINIC | Age: 59
End: 2024-06-05
Payer: COMMERCIAL

## 2024-06-05 VITALS — WEIGHT: 205 LBS | BODY MASS INDEX: 30.36 KG/M2 | HEIGHT: 69 IN

## 2024-06-05 DIAGNOSIS — M75.52 BURSITIS OF LEFT SHOULDER: ICD-10-CM

## 2024-06-05 DIAGNOSIS — M75.51 BURSITIS OF RIGHT SHOULDER: ICD-10-CM

## 2024-06-05 PROCEDURE — 99214 OFFICE O/P EST MOD 30 MIN: CPT

## 2024-06-10 ENCOUNTER — APPOINTMENT (OUTPATIENT)
Dept: ORTHOPEDIC SURGERY | Facility: CLINIC | Age: 59
End: 2024-06-10
Payer: COMMERCIAL

## 2024-06-10 ENCOUNTER — APPOINTMENT (OUTPATIENT)
Dept: PODIATRY | Facility: CLINIC | Age: 59
End: 2024-06-10
Payer: COMMERCIAL

## 2024-06-10 VITALS — WEIGHT: 205 LBS | BODY MASS INDEX: 30.36 KG/M2 | HEIGHT: 69 IN

## 2024-06-10 DIAGNOSIS — B35.3 TINEA PEDIS: ICD-10-CM

## 2024-06-10 DIAGNOSIS — M79.10 MYALGIA, UNSPECIFIED SITE: ICD-10-CM

## 2024-06-10 DIAGNOSIS — R23.4 CHANGES IN SKIN TEXTURE: ICD-10-CM

## 2024-06-10 DIAGNOSIS — M54.17 RADICULOPATHY, LUMBOSACRAL REGION: ICD-10-CM

## 2024-06-10 DIAGNOSIS — L03.032 CELLULITIS OF LEFT TOE: ICD-10-CM

## 2024-06-10 DIAGNOSIS — M65.9 SYNOVITIS AND TENOSYNOVITIS, UNSPECIFIED: ICD-10-CM

## 2024-06-10 DIAGNOSIS — M17.0 BILATERAL PRIMARY OSTEOARTHRITIS OF KNEE: ICD-10-CM

## 2024-06-10 PROCEDURE — J3490M: CUSTOM

## 2024-06-10 PROCEDURE — 99203 OFFICE O/P NEW LOW 30 MIN: CPT

## 2024-06-10 PROCEDURE — 99214 OFFICE O/P EST MOD 30 MIN: CPT | Mod: 25

## 2024-06-10 PROCEDURE — 20611 DRAIN/INJ JOINT/BURSA W/US: CPT | Mod: 50

## 2024-06-10 PROCEDURE — 99213 OFFICE O/P EST LOW 20 MIN: CPT

## 2024-06-10 RX ORDER — AMOXICILLIN AND CLAVULANATE POTASSIUM 875; 125 MG/1; MG/1
875-125 TABLET, COATED ORAL
Qty: 14 | Refills: 0 | Status: ACTIVE | COMMUNITY
Start: 2024-06-10 | End: 1900-01-01

## 2024-06-10 RX ORDER — KETOCONAZOLE FOAM 20 MG/G
2 AEROSOL, FOAM TOPICAL DAILY
Qty: 1 | Refills: 3 | Status: ACTIVE | COMMUNITY
Start: 2024-06-10 | End: 1900-01-01

## 2024-06-12 PROBLEM — R23.4 FISSURE OF SKIN: Status: ACTIVE | Noted: 2024-06-11

## 2024-06-12 NOTE — PHYSICAL EXAM
[2+] : left foot dorsalis pedis 2+ [Ankle Swelling (On Exam)] : not present [Varicose Veins Of Lower Extremities] : not present [FreeTextEntry3] : CFT: < 3 seconds x 10.  Temperature gradient: within normal limits. [de-identified] : No joint effusions or joint erythema.  ROM of all pedal joints intact, non-painful, non-crepitant.  [FreeTextEntry1] : Grossly intact to light touch.

## 2024-06-12 NOTE — ASSESSMENT
[FreeTextEntry1] : Impression: Tinea pedis, left foot (B35.3).  Cellulitis (L03.032).  Fissure of the skin (R23.4).  Treatment: Discussed etiology and treatment options.  Patient was given off work for one week.  Advised patient to wear open shoe gear that is breathable.  Patient is to wash the foot with soap and water, dry well and apply Betadine to the area.  Allow this to dry.  Patient was prescribed Ketoconazole foam to be applied in addition to the Betadine.  Patient was also prescribed oral Augmentin, antibiotic. A culture was taken of the drainage from the interspace.  If fever and chills do not resolve, patient was advised to call the office and go to the hospital.  If the symptoms worsen, if the redness increases beyond the marked area of if he has continued drainage or change in color, he is to go to the hospital.  Return: One week.

## 2024-06-12 NOTE — HISTORY OF PRESENT ILLNESS
[FreeTextEntry1] : Patient returns today for a new complaint of left 4th interspace redness, swelling with purulent drainage associated with fever/chills, which he felt today.  Symptoms were first noticed by the patient on 06/08/24.  Patient works in construction boots daily.  Denies any associated trauma.  Patient has not tried anything for this problem.  Patient denies any new medical changes.

## 2024-06-16 ENCOUNTER — EMERGENCY (EMERGENCY)
Facility: HOSPITAL | Age: 59
LOS: 0 days | Discharge: LEFT BEFORE DISCHARGE | End: 2024-06-16
Payer: COMMERCIAL

## 2024-06-16 VITALS
RESPIRATION RATE: 16 BRPM | HEART RATE: 62 BPM | SYSTOLIC BLOOD PRESSURE: 140 MMHG | TEMPERATURE: 98 F | DIASTOLIC BLOOD PRESSURE: 80 MMHG | OXYGEN SATURATION: 100 %

## 2024-06-16 VITALS
HEIGHT: 69 IN | SYSTOLIC BLOOD PRESSURE: 144 MMHG | HEART RATE: 60 BPM | TEMPERATURE: 98 F | OXYGEN SATURATION: 99 % | WEIGHT: 205.03 LBS | DIASTOLIC BLOOD PRESSURE: 78 MMHG | RESPIRATION RATE: 14 BRPM

## 2024-06-16 DIAGNOSIS — L03.116 CELLULITIS OF LEFT LOWER LIMB: ICD-10-CM

## 2024-06-16 DIAGNOSIS — J45.909 UNSPECIFIED ASTHMA, UNCOMPLICATED: ICD-10-CM

## 2024-06-16 DIAGNOSIS — Z87.19 PERSONAL HISTORY OF OTHER DISEASES OF THE DIGESTIVE SYSTEM: ICD-10-CM

## 2024-06-16 DIAGNOSIS — Z53.29 PROCEDURE AND TREATMENT NOT CARRIED OUT BECAUSE OF PATIENT'S DECISION FOR OTHER REASONS: ICD-10-CM

## 2024-06-16 DIAGNOSIS — I10 ESSENTIAL (PRIMARY) HYPERTENSION: ICD-10-CM

## 2024-06-16 DIAGNOSIS — M79.675 PAIN IN LEFT TOE(S): ICD-10-CM

## 2024-06-16 LAB
ALBUMIN SERPL ELPH-MCNC: 3.3 G/DL — SIGNIFICANT CHANGE UP (ref 3.3–5)
ALP SERPL-CCNC: 45 U/L — SIGNIFICANT CHANGE UP (ref 40–120)
ALT FLD-CCNC: 48 U/L — SIGNIFICANT CHANGE UP (ref 12–78)
ANION GAP SERPL CALC-SCNC: 5 MMOL/L — SIGNIFICANT CHANGE UP (ref 5–17)
AST SERPL-CCNC: 22 U/L — SIGNIFICANT CHANGE UP (ref 15–37)
BASOPHILS # BLD AUTO: 0.03 K/UL — SIGNIFICANT CHANGE UP (ref 0–0.2)
BASOPHILS NFR BLD AUTO: 0.5 % — SIGNIFICANT CHANGE UP (ref 0–2)
BILIRUB SERPL-MCNC: 0.3 MG/DL — SIGNIFICANT CHANGE UP (ref 0.2–1.2)
BUN SERPL-MCNC: 15 MG/DL — SIGNIFICANT CHANGE UP (ref 7–23)
CALCIUM SERPL-MCNC: 8.9 MG/DL — SIGNIFICANT CHANGE UP (ref 8.5–10.1)
CHLORIDE SERPL-SCNC: 108 MMOL/L — SIGNIFICANT CHANGE UP (ref 96–108)
CO2 SERPL-SCNC: 28 MMOL/L — SIGNIFICANT CHANGE UP (ref 22–31)
CREAT SERPL-MCNC: 1.18 MG/DL — SIGNIFICANT CHANGE UP (ref 0.5–1.3)
EGFR: 72 ML/MIN/1.73M2 — SIGNIFICANT CHANGE UP
EOSINOPHIL # BLD AUTO: 0.19 K/UL — SIGNIFICANT CHANGE UP (ref 0–0.5)
EOSINOPHIL NFR BLD AUTO: 2.9 % — SIGNIFICANT CHANGE UP (ref 0–6)
GLUCOSE SERPL-MCNC: 91 MG/DL — SIGNIFICANT CHANGE UP (ref 70–99)
HCT VFR BLD CALC: 41.7 % — SIGNIFICANT CHANGE UP (ref 39–50)
HGB BLD-MCNC: 14.1 G/DL — SIGNIFICANT CHANGE UP (ref 13–17)
IMM GRANULOCYTES NFR BLD AUTO: 0.3 % — SIGNIFICANT CHANGE UP (ref 0–0.9)
LACTATE SERPL-SCNC: 1.2 MMOL/L — SIGNIFICANT CHANGE UP (ref 0.7–2)
LYMPHOCYTES # BLD AUTO: 1.95 K/UL — SIGNIFICANT CHANGE UP (ref 1–3.3)
LYMPHOCYTES # BLD AUTO: 29.3 % — SIGNIFICANT CHANGE UP (ref 13–44)
MCHC RBC-ENTMCNC: 28.9 PG — SIGNIFICANT CHANGE UP (ref 27–34)
MCHC RBC-ENTMCNC: 33.8 G/DL — SIGNIFICANT CHANGE UP (ref 32–36)
MCV RBC AUTO: 85.5 FL — SIGNIFICANT CHANGE UP (ref 80–100)
MONOCYTES # BLD AUTO: 1.07 K/UL — HIGH (ref 0–0.9)
MONOCYTES NFR BLD AUTO: 16.1 % — HIGH (ref 2–14)
NEUTROPHILS # BLD AUTO: 3.39 K/UL — SIGNIFICANT CHANGE UP (ref 1.8–7.4)
NEUTROPHILS NFR BLD AUTO: 50.9 % — SIGNIFICANT CHANGE UP (ref 43–77)
NRBC # BLD: 0 /100 WBCS — SIGNIFICANT CHANGE UP (ref 0–0)
PLATELET # BLD AUTO: 300 K/UL — SIGNIFICANT CHANGE UP (ref 150–400)
POTASSIUM SERPL-MCNC: 3.8 MMOL/L — SIGNIFICANT CHANGE UP (ref 3.5–5.3)
POTASSIUM SERPL-SCNC: 3.8 MMOL/L — SIGNIFICANT CHANGE UP (ref 3.5–5.3)
PROT SERPL-MCNC: 7 GM/DL — SIGNIFICANT CHANGE UP (ref 6–8.3)
RBC # BLD: 4.88 M/UL — SIGNIFICANT CHANGE UP (ref 4.2–5.8)
RBC # FLD: 13.6 % — SIGNIFICANT CHANGE UP (ref 10.3–14.5)
SODIUM SERPL-SCNC: 141 MMOL/L — SIGNIFICANT CHANGE UP (ref 135–145)
WBC # BLD: 6.65 K/UL — SIGNIFICANT CHANGE UP (ref 3.8–10.5)
WBC # FLD AUTO: 6.65 K/UL — SIGNIFICANT CHANGE UP (ref 3.8–10.5)

## 2024-06-16 PROCEDURE — 73630 X-RAY EXAM OF FOOT: CPT | Mod: 26,LT

## 2024-06-16 PROCEDURE — 99285 EMERGENCY DEPT VISIT HI MDM: CPT

## 2024-06-16 RX ORDER — KETOROLAC TROMETHAMINE 30 MG/ML
15 SYRINGE (ML) INJECTION ONCE
Refills: 0 | Status: DISCONTINUED | OUTPATIENT
Start: 2024-06-16 | End: 2024-06-16

## 2024-06-16 RX ADMIN — Medication 15 MILLIGRAM(S): at 10:33

## 2024-06-16 RX ADMIN — Medication 600 MILLIGRAM(S): at 11:31

## 2024-06-16 RX ADMIN — Medication 100 MILLIGRAM(S): at 11:01

## 2024-06-16 RX ADMIN — Medication 15 MILLIGRAM(S): at 11:33

## 2024-06-16 NOTE — ED ADULT NURSE NOTE - OBJECTIVE STATEMENT
patient alert and oriented x4, came in for toe pain. pt states since last Saturday he started having pain, inflammation and swelling of left pinky toe, pt went to podiatrist Monday and was given augmentin 875mg. pt came in today due to little improvement, with pus and bleeding at site. pt OOB independent with steady gait. pt denies fever, chills, chest pain, SOB, dizziness. pmh of gerd, HTN.

## 2024-06-16 NOTE — ED ADULT TRIAGE NOTE - CHIEF COMPLAINT QUOTE
Came in for left 5th toe pain and swelling started last week. Was given antibiotics Monday by doctor but states not getting better. No fever or chills. PMH asthma

## 2024-06-16 NOTE — ED ADULT NURSE NOTE - NSICDXFAMILYHX_GEN_ALL_CORE_FT
FAMILY HISTORY:  Grandparent  Still living? Unknown  Family history of CABG, Age at diagnosis: Age Unknown

## 2024-06-16 NOTE — ED PROVIDER NOTE - CLINICAL SUMMARY MEDICAL DECISION MAKING FREE TEXT BOX
ddx: osteo, cellulitis, abscess, deep tissue infection   2+ dp pulses   pt failed outpt abx.   no osteo on xr  discussed with podiatry-- but pt does not want to wait for their eval.  will send clinda as failed augmentin   has f/u with his podiatrist dr. dia in   paolo briseno

## 2024-06-16 NOTE — ED PROVIDER NOTE - PATIENT PORTAL LINK FT
You can access the FollowMyHealth Patient Portal offered by [facility] by registering at the following website: [email or web address]. By joining [employer]’s FollowMyHealth portal, you will also be able to view your health information using other applications (apps) compatible with our system.

## 2024-06-16 NOTE — ED PROVIDER NOTE - OBJECTIVE STATEMENT
58-year-old male with PMH asthma, HTN, reflux presents with moderate constant persistent throbbing nonradiating pain in between L 4th and 5th digits at site of a wound there with surrounding swelling/warmth/redness/purulent discharge x 1 wk.   at onset went to podiatrist who gave amox 875mg bid which he has been taking.   no fever, sweats, chills, n/v.

## 2024-06-16 NOTE — ED PROVIDER NOTE - PHYSICAL EXAMINATION
PHYSICAL EXAM:    GENERAL: Alert, appears stated age, well appearing, non-toxic  SKIN: Warm and dry. MMM.   HEAD: NC, AT   EYE: Normal lids/conjunctiva  ENT: Normal hearing, patent oropharynx  NECK: +supple. No meningismus, or JVD  Pulm: normal resp effort  CV: 2+and = DP/PT pulses  Mskel: no calf tenderness. +macerated intertriginous area between L4th and L5th toes with small 1cm wound in that area-- able to express purulent discharge from that wound.  +surrounding erythema/warmth/swelling to approximately mid foot. no crepitus/streaking.   Neuro: AAOx3, no sensory/motor deficits, 5/5 strength throughout.

## 2024-06-17 PROBLEM — M79.10 MYALGIA: Status: ACTIVE | Noted: 2022-06-29

## 2024-06-17 PROBLEM — M17.0 PRIMARY OSTEOARTHRITIS OF BOTH KNEES: Status: ACTIVE | Noted: 2022-04-19

## 2024-06-17 PROBLEM — M65.9 SYNOVITIS: Status: ACTIVE | Noted: 2022-04-19

## 2024-06-17 PROBLEM — M75.52 BURSITIS OF LEFT SHOULDER: Status: ACTIVE | Noted: 2023-09-04

## 2024-06-17 PROBLEM — M75.51 BURSITIS OF RIGHT SHOULDER: Status: ACTIVE | Noted: 2023-09-04

## 2024-06-17 PROBLEM — M54.17 LUMBOSACRAL RADICULITIS: Status: ACTIVE | Noted: 2022-04-25

## 2024-06-17 LAB
BACTERIA WND CULT: ABNORMAL
GRAM STN SPEC: ABNORMAL

## 2024-06-17 RX ORDER — MELOXICAM 15 MG/1
15 TABLET ORAL DAILY
Qty: 7 | Refills: 0 | Status: ACTIVE | COMMUNITY
Start: 2024-06-17 | End: 1900-01-01

## 2024-06-17 RX ORDER — SULFAMETHOXAZOLE AND TRIMETHOPRIM 800; 160 MG/1; MG/1
800-160 TABLET ORAL
Qty: 14 | Refills: 0 | Status: ACTIVE | COMMUNITY
Start: 2024-06-17 | End: 1900-01-01

## 2024-06-17 NOTE — HISTORY OF PRESENT ILLNESS
[de-identified] : Here for follow up on the MRI results on both knees today. Still complaining of weakness and pain with certain activities. Currenlty working

## 2024-06-17 NOTE — PROCEDURE
[FreeTextEntry3] :  Procedure Note: Musculoskeletal Injection Procedure: B/L knee 18/4 Celestone injection   Indication: The patient has had persistent pain despite conservative treatment. Risks, benefits and alternatives to procedure were discussed; all questions were answered to the patient's apparent satisfaction and informed consent obtained. The patient denied prior problems with local anesthetics, injectable cortisones, chicken allergy, coagulopathy and no relevant drug or preservative allergies or sensitivities.   The area of injection was prepared in a sterile fashion. Prior to injection a 'Time Out' was conducted in accordance with [facility] [place] policy and the site and nature of procedure verified with the patient.   Procedure: The procedure was carried out utilizing sterile technique from a **superolateral** arthroscopic portal position. The needle was placed under ultrasound guidance to improve accuracy and minimize risk to the patient and diagnostic ultrasound in the long and short axis revealed  evidence of arthritis    Ultrasound Indication: targeted intra-articular injection   0cc of clear synovial fluid was aspirated   Injection into the target area with care taken to aspirate frequently to minimize the risk of intravascular injection was performed with: ( ) 1cc of Depomedrol (80mg/ml) (X) 2cc of Betamethasone (Celestone) (10mg/ml) ( ) 1cc of Toradol (30mg/ml) (X) 9cc of 0.5% Bupivacaine ( ) 1cc of 1% Lidocaine ( ) 5cc of 32mg Zilretta, prepared and diluted per  instructions   Patient tolerated the procedure well and direct pressure was applied for hemostasis. The patient was reminded of potential post-injection risks including, but not limited to, delayed hypersensitivity reactions and/or infection. The patient verified that they had the office and the Emergency Room's contact information if any problems should arise. After several minutes, the patient informed me that they felt fine and was released from the office.

## 2024-06-17 NOTE — DATA REVIEWED
[Right] : of the right [MRI] : MRI [Left] : left [Knee] : knee [Report was reviewed and noted in the chart] : The report was reviewed and noted in the chart [I independently reviewed and interpreted images and report] : I independently reviewed and interpreted images and report [I reviewed the films/CD] : I reviewed the films/CD [FreeTextEntry1] : MRI of the R knee revealed  mild to mod partial tearing and delamination of the quadriceps insertion appears progressed with surrounding soft tissue swelling and bursitis and evidence of s/p partial meniscectomy without recurrent tear [FreeTextEntry2] : MRI of the L knee  revealed progression of moderate partial tearing and delamination of the quadriceps insertion and medial and PF arthrosis

## 2024-06-17 NOTE — DISCUSSION/SUMMARY
[Surgical risks reviewed] : Surgical risks reviewed [de-identified] : The patient's condition is acute Confounding medical conditions/concerns: Tests/Studies Independently Interpreted Today:  MRI of the R knee revealed mild to mod partial tearing and delamination of the quadriceps insertion appears progressed with surrounding soft tissue swelling and bursitis and evidence of s/p partial meniscectomy without recurrent tear and chondral loss in the medial and PF compartments.  MRI of the L knee  revealed progression of moderate partial tearing and delamination of the quadriceps insertion and medial and PF arthrosis  Pt has partial quad tears on BL knees   We reviewed the mri findings and discussed treatment options, both operative and non operative for pt's B/L knee partial quad tears. Discussed risks of potential surgery. However, due to the risks of the surgery, we will try NSAIDs and therapy. Discussed management of medication. Discussed the risk, benefits and rehab for the partial quad tearing. Also discussed the risk of persistent weakness past conservative treatment.   The risks and benefits of surgery have been discussed. Risks include but are not limited to bleeding, infection, reaction to anesthesia, injury to blood vessels and nerves, malunion, nonunion, DVT, PE, necessity of repeat surgery, chronic pain, loss of limb and death. The patient understands the risks and is unsure if he wants to pursue surgery for this . All questions have been answered.   Pt can f/u for CSI inj of the knees if he does not wish to proceed with surgery   I, Jennifer Niño, attest that this documentation has been prepared under the direction and in the presence of Provider Dr. Carmine Oneill

## 2024-06-17 NOTE — DISCUSSION/SUMMARY
[Surgical risks reviewed] : Surgical risks reviewed [Medication Risks Reviewed] : Medication risks reviewed [de-identified] :  ------------------------------------------------------------------------------------------------------------------  Discussed risks of surgical treatment and nonsurgical treatment of arthritis, discussed risks of steroid injection plus or minus Visco supplementation, risks of Zilretta and benefits, role of surgery and MRI, risks and role of NSAIDs and side effects, benefits of therapy. The patient is aware and understands that if he fails all conservative treatment modalities, he should consider a total knee arthroplasty. In the interim, we will focus on conservative management of arthritic related pain.   Also discussed pursuing conservative management of arthritis vs sx intervention regarding his B/L partial quad tears. Pt does not want to pursue surgery at this time and decided to proceed with CSI injections of the B/L knees   Discussed treatment options in the form of injection therapy. Patient elected to receive B/L knee CSI injections under ultrasound guidance. Advised patient to rest and ice the area.   The risks, benefits and contents of the injection have been discussed. Risks include but are not limited to allergic reaction, flare reaction, permanent white skin discoloration at the injection site and infection.  The patient understands the risks and agrees to having the injection.  All questions have been answered.  discussed continued use of voltaren and mobic as he can only take one and will continue what works best The risks and benefits of surgery have been discussed. Risks include but are not limited to bleeding, infection, reaction to anesthesia, injury to blood vessels and nerves, malunion, nonunion, DVT, PE, necessity of repeat surgery, chronic pain, loss of limb and death. The patient understands the risks and has chosen to proceed with conservative care. All questions have been answered. discussed his shoulders which he should consider surgery Discussed the timing of the injections and the follow up that is needed. Advised the patient to ice the area that was injected and that it may take a few days before experiencing relief.   explained that i am out of network with his insurance and his $30 payment is now much lower than his co insurance, if he wishes to keep seeing me he will be responsible for what his insurance doesnt pay though i will care for him until he can find another doctor   I, Jennifer Niño, attest that this documentation has been prepared under the direction and in the presence of Provider Dr. Carmine Oneill

## 2024-06-17 NOTE — PHYSICAL EXAM
[Bilateral] : knee bilaterally [NL (0)] : extension 0 degrees [Positive] : positive Cayla [] : no erythema [TWNoteComboBox7] : flexion 135 degrees

## 2024-06-24 ENCOUNTER — APPOINTMENT (OUTPATIENT)
Dept: PODIATRY | Facility: CLINIC | Age: 59
End: 2024-06-24
Payer: COMMERCIAL

## 2024-06-24 DIAGNOSIS — M79.675 PAIN IN LEFT TOE(S): ICD-10-CM

## 2024-06-24 DIAGNOSIS — M20.40 OTHER HAMMER TOE(S) (ACQUIRED), UNSPECIFIED FOOT: ICD-10-CM

## 2024-06-24 PROCEDURE — 73630 X-RAY EXAM OF FOOT: CPT | Mod: LT

## 2024-06-24 PROCEDURE — 99213 OFFICE O/P EST LOW 20 MIN: CPT | Mod: 25

## 2024-06-24 RX ORDER — SILVER SULFADIAZINE 10 MG/G
1 CREAM TOPICAL TWICE DAILY
Qty: 1 | Refills: 1 | Status: ACTIVE | COMMUNITY
Start: 2024-06-24 | End: 1900-01-01

## 2024-06-24 RX ORDER — SULFAMETHOXAZOLE AND TRIMETHOPRIM 800; 160 MG/1; MG/1
800-160 TABLET ORAL TWICE DAILY
Qty: 16 | Refills: 0 | Status: ACTIVE | COMMUNITY
Start: 2024-06-24 | End: 1900-01-01

## 2024-06-26 PROBLEM — M79.675 PAIN OF TOE OF LEFT FOOT: Status: ACTIVE | Noted: 2024-06-25

## 2024-06-26 PROBLEM — M20.40 HAMMERTOE: Status: ACTIVE | Noted: 2024-06-25

## 2024-07-01 ENCOUNTER — APPOINTMENT (OUTPATIENT)
Dept: PODIATRY | Facility: CLINIC | Age: 59
End: 2024-07-01
Payer: COMMERCIAL

## 2024-07-01 DIAGNOSIS — M79.672 PAIN IN LEFT FOOT: ICD-10-CM

## 2024-07-01 DIAGNOSIS — L97.921 NON-PRESSURE CHRONIC ULCER OF UNSPECIFIED PART OF LEFT LOWER LEG LIMITED TO BREAKDOWN OF SKIN: ICD-10-CM

## 2024-07-01 PROCEDURE — 99212 OFFICE O/P EST SF 10 MIN: CPT

## 2024-07-01 RX ORDER — DICLOFENAC SODIUM 1% 10 MG/G
1 GEL TOPICAL DAILY
Qty: 1 | Refills: 1 | Status: ACTIVE | COMMUNITY
Start: 2024-07-01 | End: 1900-01-01

## 2024-07-01 RX ORDER — HYDROCORTISONE 1 %
12 CREAM (GRAM) TOPICAL TWICE DAILY
Qty: 1 | Refills: 2 | Status: ACTIVE | COMMUNITY
Start: 2024-07-01 | End: 1900-01-01

## 2024-07-02 PROBLEM — M79.672 LEFT FOOT PAIN: Status: ACTIVE | Noted: 2023-05-25

## 2024-07-02 PROBLEM — L97.921 NON-PRESSURE ULCER OF LEFT LOWER EXTREMITY, LIMITED TO BREAKDOWN OF SKIN: Status: ACTIVE | Noted: 2024-07-02

## 2024-07-08 ENCOUNTER — APPOINTMENT (OUTPATIENT)
Dept: PODIATRY | Facility: CLINIC | Age: 59
End: 2024-07-08

## 2024-07-08 ENCOUNTER — EMERGENCY (EMERGENCY)
Facility: HOSPITAL | Age: 59
LOS: 0 days | Discharge: HOME | End: 2024-07-08
Attending: STUDENT IN AN ORGANIZED HEALTH CARE EDUCATION/TRAINING PROGRAM
Payer: COMMERCIAL

## 2024-07-08 VITALS
HEART RATE: 47 BPM | OXYGEN SATURATION: 98 % | RESPIRATION RATE: 19 BRPM | DIASTOLIC BLOOD PRESSURE: 83 MMHG | SYSTOLIC BLOOD PRESSURE: 155 MMHG | TEMPERATURE: 98 F

## 2024-07-08 VITALS
HEART RATE: 54 BPM | OXYGEN SATURATION: 98 % | RESPIRATION RATE: 19 BRPM | WEIGHT: 199.96 LBS | DIASTOLIC BLOOD PRESSURE: 74 MMHG | HEIGHT: 69 IN | TEMPERATURE: 98 F | SYSTOLIC BLOOD PRESSURE: 131 MMHG

## 2024-07-08 DIAGNOSIS — R10.9 UNSPECIFIED ABDOMINAL PAIN: ICD-10-CM

## 2024-07-08 DIAGNOSIS — I10 ESSENTIAL (PRIMARY) HYPERTENSION: ICD-10-CM

## 2024-07-08 DIAGNOSIS — M54.50 LOW BACK PAIN, UNSPECIFIED: ICD-10-CM

## 2024-07-08 LAB
ALBUMIN SERPL ELPH-MCNC: 3.8 G/DL — SIGNIFICANT CHANGE UP (ref 3.3–5)
ALP SERPL-CCNC: 35 U/L — LOW (ref 40–120)
ALT FLD-CCNC: 39 U/L — SIGNIFICANT CHANGE UP (ref 12–78)
ANION GAP SERPL CALC-SCNC: 5 MMOL/L — SIGNIFICANT CHANGE UP (ref 5–17)
APPEARANCE UR: CLEAR — SIGNIFICANT CHANGE UP
AST SERPL-CCNC: 49 U/L — HIGH (ref 15–37)
BACTERIA # UR AUTO: NEGATIVE /HPF — SIGNIFICANT CHANGE UP
BASOPHILS # BLD AUTO: 0.05 K/UL — SIGNIFICANT CHANGE UP (ref 0–0.2)
BASOPHILS NFR BLD AUTO: 1 % — SIGNIFICANT CHANGE UP (ref 0–2)
BILIRUB SERPL-MCNC: 0.6 MG/DL — SIGNIFICANT CHANGE UP (ref 0.2–1.2)
BILIRUB UR-MCNC: NEGATIVE — SIGNIFICANT CHANGE UP
BUN SERPL-MCNC: 11 MG/DL — SIGNIFICANT CHANGE UP (ref 7–23)
CALCIUM SERPL-MCNC: 9.2 MG/DL — SIGNIFICANT CHANGE UP (ref 8.5–10.1)
CHLORIDE SERPL-SCNC: 107 MMOL/L — SIGNIFICANT CHANGE UP (ref 96–108)
CO2 SERPL-SCNC: 27 MMOL/L — SIGNIFICANT CHANGE UP (ref 22–31)
COLOR SPEC: YELLOW — SIGNIFICANT CHANGE UP
CREAT SERPL-MCNC: 1.34 MG/DL — HIGH (ref 0.5–1.3)
DIFF PNL FLD: NEGATIVE — SIGNIFICANT CHANGE UP
EGFR: 61 ML/MIN/1.73M2 — SIGNIFICANT CHANGE UP
EOSINOPHIL # BLD AUTO: 0.24 K/UL — SIGNIFICANT CHANGE UP (ref 0–0.5)
EOSINOPHIL NFR BLD AUTO: 4.8 % — SIGNIFICANT CHANGE UP (ref 0–6)
EPI CELLS # UR: PRESENT
GLUCOSE SERPL-MCNC: 78 MG/DL — SIGNIFICANT CHANGE UP (ref 70–99)
GLUCOSE UR QL: NEGATIVE MG/DL — SIGNIFICANT CHANGE UP
HCT VFR BLD CALC: 38.5 % — LOW (ref 39–50)
HGB BLD-MCNC: 13.4 G/DL — SIGNIFICANT CHANGE UP (ref 13–17)
IMM GRANULOCYTES NFR BLD AUTO: 0.2 % — SIGNIFICANT CHANGE UP (ref 0–0.9)
KETONES UR-MCNC: NEGATIVE MG/DL — SIGNIFICANT CHANGE UP
LEUKOCYTE ESTERASE UR-ACNC: NEGATIVE — SIGNIFICANT CHANGE UP
LYMPHOCYTES # BLD AUTO: 1.91 K/UL — SIGNIFICANT CHANGE UP (ref 1–3.3)
LYMPHOCYTES # BLD AUTO: 38.2 % — SIGNIFICANT CHANGE UP (ref 13–44)
MCHC RBC-ENTMCNC: 29.6 PG — SIGNIFICANT CHANGE UP (ref 27–34)
MCHC RBC-ENTMCNC: 34.8 G/DL — SIGNIFICANT CHANGE UP (ref 32–36)
MCV RBC AUTO: 85.2 FL — SIGNIFICANT CHANGE UP (ref 80–100)
MONOCYTES # BLD AUTO: 0.77 K/UL — SIGNIFICANT CHANGE UP (ref 0–0.9)
MONOCYTES NFR BLD AUTO: 15.4 % — HIGH (ref 2–14)
NEUTROPHILS # BLD AUTO: 2.02 K/UL — SIGNIFICANT CHANGE UP (ref 1.8–7.4)
NEUTROPHILS NFR BLD AUTO: 40.4 % — LOW (ref 43–77)
NITRITE UR-MCNC: NEGATIVE — SIGNIFICANT CHANGE UP
NRBC # BLD: 0 /100 WBCS — SIGNIFICANT CHANGE UP (ref 0–0)
PH UR: 6 — SIGNIFICANT CHANGE UP (ref 5–8)
PLATELET # BLD AUTO: 205 K/UL — SIGNIFICANT CHANGE UP (ref 150–400)
POTASSIUM SERPL-MCNC: 4.4 MMOL/L — SIGNIFICANT CHANGE UP (ref 3.5–5.3)
POTASSIUM SERPL-SCNC: 4.4 MMOL/L — SIGNIFICANT CHANGE UP (ref 3.5–5.3)
PROT SERPL-MCNC: 7 GM/DL — SIGNIFICANT CHANGE UP (ref 6–8.3)
PROT UR-MCNC: NEGATIVE MG/DL — SIGNIFICANT CHANGE UP
RBC # BLD: 4.52 M/UL — SIGNIFICANT CHANGE UP (ref 4.2–5.8)
RBC # FLD: 14.9 % — HIGH (ref 10.3–14.5)
RBC CASTS # UR COMP ASSIST: 1 /HPF — SIGNIFICANT CHANGE UP (ref 0–4)
SODIUM SERPL-SCNC: 139 MMOL/L — SIGNIFICANT CHANGE UP (ref 135–145)
SP GR SPEC: 1.02 — SIGNIFICANT CHANGE UP (ref 1–1.03)
UROBILINOGEN FLD QL: 0.2 MG/DL — SIGNIFICANT CHANGE UP (ref 0.2–1)
WBC # BLD: 5 K/UL — SIGNIFICANT CHANGE UP (ref 3.8–10.5)
WBC # FLD AUTO: 5 K/UL — SIGNIFICANT CHANGE UP (ref 3.8–10.5)
WBC UR QL: 2 /HPF — SIGNIFICANT CHANGE UP (ref 0–5)

## 2024-07-08 PROCEDURE — 99284 EMERGENCY DEPT VISIT MOD MDM: CPT

## 2024-07-08 PROCEDURE — 74176 CT ABD & PELVIS W/O CONTRAST: CPT | Mod: 26,MC

## 2024-07-08 RX ORDER — ACETAMINOPHEN 325 MG
1000 TABLET ORAL ONCE
Refills: 0 | Status: COMPLETED | OUTPATIENT
Start: 2024-07-08 | End: 2024-07-08

## 2024-07-08 RX ORDER — METHOCARBAMOL 500 MG
1 TABLET ORAL
Qty: 15 | Refills: 0
Start: 2024-07-08 | End: 2024-07-12

## 2024-07-08 RX ORDER — LIDOCAINE HCL 28 MG/G
1 GEL TOPICAL
Qty: 2 | Refills: 0
Start: 2024-07-08 | End: 2024-07-17

## 2024-07-08 RX ORDER — LIDOCAINE HCL 28 MG/G
1 GEL TOPICAL ONCE
Refills: 0 | Status: COMPLETED | OUTPATIENT
Start: 2024-07-08 | End: 2024-07-08

## 2024-07-08 RX ADMIN — LIDOCAINE HCL 1 PATCH: 28 GEL TOPICAL at 14:21

## 2024-07-08 RX ADMIN — Medication 400 MILLIGRAM(S): at 14:20

## 2024-07-09 ENCOUNTER — APPOINTMENT (OUTPATIENT)
Dept: MRI IMAGING | Facility: CLINIC | Age: 59
End: 2024-07-09
Payer: COMMERCIAL

## 2024-07-09 ENCOUNTER — APPOINTMENT (OUTPATIENT)
Dept: ORTHOPEDIC SURGERY | Facility: CLINIC | Age: 59
End: 2024-07-09
Payer: COMMERCIAL

## 2024-07-09 VITALS — BODY MASS INDEX: 30.36 KG/M2 | WEIGHT: 205 LBS | HEIGHT: 69 IN

## 2024-07-09 DIAGNOSIS — M19.041 PRIMARY OSTEOARTHRITIS, RIGHT HAND: ICD-10-CM

## 2024-07-09 DIAGNOSIS — M19.042 PRIMARY OSTEOARTHRITIS, LEFT HAND: ICD-10-CM

## 2024-07-09 DIAGNOSIS — G56.02 CARPAL TUNNEL SYNDROME, LEFT UPPER LIMB: ICD-10-CM

## 2024-07-09 LAB
CULTURE RESULTS: NO GROWTH — SIGNIFICANT CHANGE UP
SPECIMEN SOURCE: SIGNIFICANT CHANGE UP

## 2024-07-09 PROCEDURE — J3490M: CUSTOM

## 2024-07-09 PROCEDURE — 20526 THER INJECTION CARP TUNNEL: CPT | Mod: 50

## 2024-07-09 PROCEDURE — 20600 DRAIN/INJ JOINT/BURSA W/O US: CPT | Mod: 50,59

## 2024-07-09 PROCEDURE — 99214 OFFICE O/P EST MOD 30 MIN: CPT

## 2024-07-09 PROCEDURE — 72148 MRI LUMBAR SPINE W/O DYE: CPT

## 2024-07-15 ENCOUNTER — APPOINTMENT (OUTPATIENT)
Dept: PODIATRY | Facility: CLINIC | Age: 59
End: 2024-07-15

## 2024-08-13 NOTE — HISTORY OF PRESENT ILLNESS
[FreeTextEntry1] : pt is following up for mri results, MD will review images and report  [Neck] : neck [9] : 9 [Dull/Aching] : dull/aching [Radiating] : radiating [Sharp] : sharp [Shooting] : shooting [Tingling] : tingling [Constant] : constant [Household chores] : household chores [Work] : work [Sleep] : sleep [Social interactions] : social interactions [Injection therapy] : injection therapy [Nothing helps with pain getting better] : Nothing helps with pain getting better [Sitting] : sitting [FreeTextEntry7] : back around armpit to the chest, lt leg [] : no [FreeTextEntry9] : TPI  [de-identified] : driving

## 2024-08-14 ENCOUNTER — APPOINTMENT (OUTPATIENT)
Dept: PAIN MANAGEMENT | Facility: CLINIC | Age: 59
End: 2024-08-14

## 2024-08-28 ENCOUNTER — APPOINTMENT (OUTPATIENT)
Dept: PAIN MANAGEMENT | Facility: CLINIC | Age: 59
End: 2024-08-28
Payer: COMMERCIAL

## 2024-08-28 VITALS — BODY MASS INDEX: 29.03 KG/M2 | WEIGHT: 196 LBS | HEIGHT: 69 IN

## 2024-08-28 DIAGNOSIS — M79.18 MYALGIA, OTHER SITE: ICD-10-CM

## 2024-08-28 DIAGNOSIS — M54.2 CERVICALGIA: ICD-10-CM

## 2024-08-28 DIAGNOSIS — M54.12 RADICULOPATHY, CERVICAL REGION: ICD-10-CM

## 2024-08-28 DIAGNOSIS — M54.17 RADICULOPATHY, LUMBOSACRAL REGION: ICD-10-CM

## 2024-08-28 DIAGNOSIS — M54.50 LOW BACK PAIN, UNSPECIFIED: ICD-10-CM

## 2024-08-28 PROCEDURE — J3490M: CUSTOM

## 2024-08-28 PROCEDURE — 20552 NJX 1/MLT TRIGGER POINT 1/2: CPT

## 2024-08-28 PROCEDURE — 99214 OFFICE O/P EST MOD 30 MIN: CPT | Mod: 25

## 2024-08-28 RX ORDER — IBUPROFEN AND FAMOTIDINE 26.6; 8 MG/1; MG/1
800-26.6 TABLET, COATED ORAL
Qty: 90 | Refills: 2 | Status: ACTIVE | COMMUNITY
Start: 2024-08-28 | End: 1900-01-01

## 2024-08-28 NOTE — PHYSICAL EXAM
[] : positive facet loading [de-identified] : PHYSICAL EXAM  Constitutional:  Appears well, no apparent distress Ability to communicate: Normal Respiratory: non-labored breathing Skin: no rash noted Head: normocephalic, atraumatic Neck: no visible thyroid enlargement Eyes: extraocular movements intact Neurologic: alert and oriented x3 Psychiatric: normal mood, affect, and behavior  Lumbar Spine:  Palpation: left lumbar paraspinal spasm and left lumbar paraspinal tenderness to palpation. ROM: Diminished range of motion in all plains.  Patient notes pain with lateral bending to the left. MMT: Motor exam is 5/5 through out bilateral lower extremities. Sensation: Light touch and pain is intact throughout bilateral lower extremities. Reflexes: achilles and patella reflexes are intact and  symmetrical.  No sustained clonus. Special Testing: Positive straight leg raise on the left side.  Assessemnt: Radiculopathy of lumbosacral region (M54.17) Myalgia (M79.10)  Plan: After discussing various treatment options with the patient including but not limited to oral medications, physical therapy, exercise modalities as well as interventional spinal injections, we have decided with the following plan: The patient is presenting with acute/sub-acute radicular pain with impairment in ADLs and functionality.  The pain has not responded to conservative care including NSAID therapy and/or physical therapy.  There is no bleeding tendency, unstable medical condition, or systemic infection  The risks, benefits and alternatives of the proposed procedure were explained in detail with the patient.  The risks outlined include but are not limited to infection, bleeding, post dural puncture headache, nerve injury, a temporary increase in pain, failure to resolve symptoms, allergic reaction, symptom recurrence, and possible elevation of blood sugar.  All questions were answered to patient's satisfaction and he/she verbalized an understanding.  Follow up 1-2 weeks post injection foe re-evaluation.  Continue home exercises, stretching, activity modification, physical therapy, and conservative care.

## 2024-08-28 NOTE — HISTORY OF PRESENT ILLNESS
[Neck] : neck [Dull/Aching] : dull/aching [Radiating] : radiating [Sharp] : sharp [Shooting] : shooting [Tingling] : tingling [Constant] : constant [Household chores] : household chores [Work] : work [Sleep] : sleep [Social interactions] : social interactions [Injection therapy] : injection therapy [Nothing helps with pain getting better] : Nothing helps with pain getting better [Sitting] : sitting [Lower back] : lower back [9] : 9 [8] : 8 [FreeTextEntry1] : pt is following up for mri results, MD will review images and report   [] : no [FreeTextEntry7] : B/L LEGS  [FreeTextEntry9] : TPI  [de-identified] : driving

## 2024-08-28 NOTE — HISTORY OF PRESENT ILLNESS
[Neck] : neck [Dull/Aching] : dull/aching [Radiating] : radiating [Sharp] : sharp [Shooting] : shooting [Tingling] : tingling [Constant] : constant [Household chores] : household chores [Work] : work [Sleep] : sleep [Social interactions] : social interactions [Injection therapy] : injection therapy [Nothing helps with pain getting better] : Nothing helps with pain getting better [Sitting] : sitting [Lower back] : lower back [9] : 9 [8] : 8 [FreeTextEntry1] : pt is following up for mri results, MD will review images and report   [] : no [FreeTextEntry7] : B/L LEGS  [FreeTextEntry9] : TPI  [de-identified] : driving

## 2024-08-28 NOTE — PROCEDURE
[Trigger point 1-2 muscle groups] : trigger point 1-2 muscle groups [Right] : of the right [Lumbar paraspinal muscle] : lumbar paraspinal muscle [Cervical paraspinal muscle] : cervical paraspinal muscle [Pain] : pain [Inflammation] : inflammation [Alcohol] : alcohol [Ethyl Chloride sprayed topically] : ethyl chloride sprayed topically [Sterile technique used] : sterile technique used [___ cc    1%] : Lidocaine ~Vcc of 1%  [___ cc    0.25%] : Bupivacaine (Marcaine) ~Vcc of 0.25%  [___ cc    10mg] : Triamcinolone (Kenalog) ~Vcc of 10 mg  [] : Patient tolerated procedure well [Call if redness, pain or fever occur] : call if redness, pain or fever occur [Risks, benefits, alternatives discussed / Verbal consent obtained] : the risks benefits, and alternatives have been discussed, and verbal consent was obtained

## 2024-08-28 NOTE — PHYSICAL EXAM
[] : positive facet loading [de-identified] : PHYSICAL EXAM  Constitutional:  Appears well, no apparent distress Ability to communicate: Normal Respiratory: non-labored breathing Skin: no rash noted Head: normocephalic, atraumatic Neck: no visible thyroid enlargement Eyes: extraocular movements intact Neurologic: alert and oriented x3 Psychiatric: normal mood, affect, and behavior  Lumbar Spine:  Palpation: left lumbar paraspinal spasm and left lumbar paraspinal tenderness to palpation. ROM: Diminished range of motion in all plains.  Patient notes pain with lateral bending to the left. MMT: Motor exam is 5/5 through out bilateral lower extremities. Sensation: Light touch and pain is intact throughout bilateral lower extremities. Reflexes: achilles and patella reflexes are intact and  symmetrical.  No sustained clonus. Special Testing: Positive straight leg raise on the left side.  Assessemnt: Radiculopathy of lumbosacral region (M54.17) Myalgia (M79.10)  Plan: After discussing various treatment options with the patient including but not limited to oral medications, physical therapy, exercise modalities as well as interventional spinal injections, we have decided with the following plan: The patient is presenting with acute/sub-acute radicular pain with impairment in ADLs and functionality.  The pain has not responded to conservative care including NSAID therapy and/or physical therapy.  There is no bleeding tendency, unstable medical condition, or systemic infection  The risks, benefits and alternatives of the proposed procedure were explained in detail with the patient.  The risks outlined include but are not limited to infection, bleeding, post dural puncture headache, nerve injury, a temporary increase in pain, failure to resolve symptoms, allergic reaction, symptom recurrence, and possible elevation of blood sugar.  All questions were answered to patient's satisfaction and he/she verbalized an understanding.  Follow up 1-2 weeks post injection foe re-evaluation.  Continue home exercises, stretching, activity modification, physical therapy, and conservative care.

## 2024-09-05 ENCOUNTER — APPOINTMENT (OUTPATIENT)
Dept: PAIN MANAGEMENT | Facility: CLINIC | Age: 59
End: 2024-09-05

## 2024-09-12 ENCOUNTER — APPOINTMENT (OUTPATIENT)
Dept: PAIN MANAGEMENT | Facility: CLINIC | Age: 59
End: 2024-09-12

## 2024-09-12 ENCOUNTER — APPOINTMENT (OUTPATIENT)
Dept: PAIN MANAGEMENT | Facility: CLINIC | Age: 59
End: 2024-09-12
Payer: COMMERCIAL

## 2024-09-12 DIAGNOSIS — M54.17 RADICULOPATHY, LUMBOSACRAL REGION: ICD-10-CM

## 2024-09-12 PROCEDURE — 62323 NJX INTERLAMINAR LMBR/SAC: CPT

## 2024-09-12 NOTE — PROCEDURE
[FreeTextEntry3] : Date of Service: 09/12/2024   Account: [id number]   Patient: SVITLANA CAMPBELL   YOB: 1965   Age: 59 year     Surgeon: Rosales Roca M.D.   Pre-Operative Diagnosis: Lumbosacral radiculitis   Post Operative Diagnosis: Lumbosacral radiculitis   Procedure: Caudal epidural steroid injection  under fluoroscopic guidance        This procedure was carried out using fluoroscopic guidance.  The risks and benefits of the procedure were discussed extensively with the patient.  Risks included infection, bleeding, epidural hematoma, nerve damage, and post-dural puncture headache.  The consent of the patient was obtained and the following procedure was performed.   The patient was placed in the prone position using a pillow under the abdomen to reduce the lumbar lordosis.  The lumbo-sacral area was prepped and draped in a sterile fashion.  Using fluoroscopic guidance the sacrum was visualized using a lateral view.   Using sterile technique the superficial skin was anesthetized with 1.5% Lidocaine without epinephrine.  A 20 gauge Tuohoy needle was advanced under fluoroscopy using amfge-[id number] technique until the sacral-coccygeal ligament was engaged and penetrated.  After confirmation of needle placement in the epidural space, the needle was advanced to approximately the S3 level.   After negative aspiration for heme and CSF, 3 cc of Omnipaque confirmed good lumbar epiduragram.  An AP view was used to confirm dye spread.  An injectate of 9 cc of preservative free normal saline plus 80 mg of kenalog was then injected into the epidural space. The needle was subsequently removed and pressure was applied.   The patient tolerated the procedure well without any complications.  Vitals signs remained within normal limits throughout the procedure.  The patient was instructed to apply ice to the area for approximately 20 minutes 3-4  times for the next 24 hours.  The patient was also instructed to contact me immediately if there were any problems.     Rosales Roca M.D.

## 2024-09-20 ENCOUNTER — APPOINTMENT (OUTPATIENT)
Dept: PAIN MANAGEMENT | Facility: CLINIC | Age: 59
End: 2024-09-20
Payer: COMMERCIAL

## 2024-09-20 DIAGNOSIS — M54.12 RADICULOPATHY, CERVICAL REGION: ICD-10-CM

## 2024-09-20 PROCEDURE — 62321 NJX INTERLAMINAR CRV/THRC: CPT

## 2024-09-20 NOTE — PROCEDURE
[FreeTextEntry3] : Date of Service: 09/20/2024   Account: [id number]  Patient: SVITLANA CAMPBELL   YOB: 1965  Age: 59 year   Surgeon: Rosales Roca M.D.  Pre-Operative Diagnosis: Cervical Radiculopathy  Post Operative Diagnosis: Cervical Radiculopathy  Procedure: Interlaminar cervical epidural steroid injection (C7-T1) under fluoroscopic guidance     This procedure was carried out using fluoroscopic guidance.  The risks and benefits of the procedure were discussed extensively with the patient.  The consent of the patient was obtained and the following procedure was performed.  The patient was placed in the prone position using a thoracic and chin support.  The cervical area was prepped and draped in a sterile fashion.  The fluoroscope visualized the C7-T1 interspace using slight cephalad-caudad angulation and this area was marked.  Using sterile technique the superficial skin was anesthetized with 1% Lidocaine without epinephrine.  A 18 gauge Tuohoy needle was advanced under fluoroscopy using [id number] technique until ligament was engaged.  The stilette was then removed and a column of preservative free normal saline flushed through the tuohoy needle and left with a concave fluid level above the butterfly portion of the tuohoy needle.  The needle was then advanced under fluoroscopic guidance until the column of saline disappeared.  Lateral view confirmed final needle tip placement in the epidural space.  After negative aspiration for heme and CSF, 1 cc of omnipaque confirmed good cervical epiduragram.    Cervical epidurogram showed no evidence of intrathecal or intravascular flow, and good bilateral epidural flow from C3 to T2 levels.    An injectate of 3cc of preservative free normal saline plus 12 mg of betamethasone was then injected into the epidural space. The needle was subsequently removed and pressure was applied.  Anesthesia personnel were present throughout the procedure.  The patient tolerated the procedure well and was instructed to contact me immediately if there were any problems.   Rosales Roca M.D.

## 2024-10-01 NOTE — HISTORY OF PRESENT ILLNESS
[FreeTextEntry1] : pt is following up for mri results, MD will review images and report   [] : no [FreeTextEntry7] : B/L LEGS  [FreeTextEntry9] : TPI  [de-identified] : driving

## 2024-10-02 ENCOUNTER — APPOINTMENT (OUTPATIENT)
Dept: PAIN MANAGEMENT | Facility: CLINIC | Age: 59
End: 2024-10-02

## 2024-10-10 NOTE — PHYSICAL EXAM
[2+] : left foot dorsalis pedis 2+ [Skin Color & Pigmentation] : normal skin color and pigmentation [Skin Turgor] : normal skin turgor [] : no rash [Skin Lesions] : no skin lesions [Sensation] : the sensory exam was normal to light touch and pinprick [No Focal Deficits] : no focal deficits [Deep Tendon Reflexes (DTR)] : deep tendon reflexes were 2+ and symmetric [Motor Exam] : the motor exam was normal [Ankle Swelling (On Exam)] : not present [Varicose Veins Of Lower Extremities] : not present [Delayed in the Right Toes] : capillary refills normal in right toes [Delayed in the Left Toes] : capillary refills normal in the left toes [FreeTextEntry3] : Hair growth noted on digits. Proximal to distal cooling is within normal limits.  [de-identified] : He has medial band insertional plantar fasciitis and heel spur syndrome. It is very sensitive at the medial insertion of the plantar fascia and direct plantarly at the heel. There is a 2nd hammertoe as well. [Foot Ulcer] : no foot ulcer [Skin Induration] : no skin induration Discussed the plan of general anesthesia and explained all of the risks and benefits of general anesthesia- including risk of cardiopulmonary compromise, eye injury, sore throat, airway injury, postoperative nausea and vomiting, and nerve injury. All questions were answered and patient is in agreement for general anesthesia

## 2024-10-21 ENCOUNTER — APPOINTMENT (OUTPATIENT)
Dept: ORTHOPEDIC SURGERY | Facility: CLINIC | Age: 59
End: 2024-10-21
Payer: OTHER MISCELLANEOUS

## 2024-10-21 ENCOUNTER — APPOINTMENT (OUTPATIENT)
Dept: MRI IMAGING | Facility: CLINIC | Age: 59
End: 2024-10-21
Payer: OTHER MISCELLANEOUS

## 2024-10-21 VITALS — HEIGHT: 69 IN | WEIGHT: 196 LBS | BODY MASS INDEX: 29.03 KG/M2

## 2024-10-21 DIAGNOSIS — S76.112A STRAIN OF LEFT QUADRICEPS MUSCLE, FASCIA AND TENDON, INITIAL ENCOUNTER: ICD-10-CM

## 2024-10-21 DIAGNOSIS — M25.462 EFFUSION, LEFT KNEE: ICD-10-CM

## 2024-10-21 DIAGNOSIS — M23.92 UNSPECIFIED INTERNAL DERANGEMENT OF LEFT KNEE: ICD-10-CM

## 2024-10-21 PROCEDURE — 99214 OFFICE O/P EST MOD 30 MIN: CPT

## 2024-10-21 PROCEDURE — 73721 MRI JNT OF LWR EXTRE W/O DYE: CPT | Mod: LT

## 2024-10-21 RX ORDER — NAPROXEN 500 MG/1
500 TABLET ORAL TWICE DAILY
Qty: 30 | Refills: 0 | Status: ACTIVE | COMMUNITY
Start: 2024-10-21 | End: 2024-11-05

## 2024-10-21 NOTE — PATIENT PROFILE ADULT. - MEDICATION ADMINISTRATION INFO, PROFILE
otherwise, Pt denied any headaches, no dizziness, no blurring of vision; no sorethroat; no cough, no fever. no chest pains, no SOB, no palpitations, no abdominal pains, no nausea/ vomiting/ diarrhea; no arthralgia/ no pruritus. denied any muscle/ joint pains/Genitourinary...
no concerns

## 2024-10-22 ENCOUNTER — NON-APPOINTMENT (OUTPATIENT)
Age: 59
End: 2024-10-22

## 2024-10-25 ENCOUNTER — APPOINTMENT (OUTPATIENT)
Dept: ORTHOPEDIC SURGERY | Facility: CLINIC | Age: 59
End: 2024-10-25
Payer: OTHER MISCELLANEOUS

## 2024-10-25 VITALS — WEIGHT: 196 LBS | BODY MASS INDEX: 29.03 KG/M2 | HEIGHT: 69 IN

## 2024-10-25 DIAGNOSIS — M65.90 UNSPECIFIED SYNOVITIS AND TENOSYNOVITIS, UNSPECIFIED SITE: ICD-10-CM

## 2024-10-25 DIAGNOSIS — M65.969 UNSP SYNOVITIS AND TENOSYNOVITIS, UNSPECIFIED LOWER LEG: ICD-10-CM

## 2024-10-25 DIAGNOSIS — S83.242D OTHER TEAR OF MEDIAL MENISCUS, CURRENT INJURY, LEFT KNEE, SUBSEQUENT ENCOUNTER: ICD-10-CM

## 2024-10-25 PROCEDURE — 99214 OFFICE O/P EST MOD 30 MIN: CPT

## 2024-10-30 ENCOUNTER — NON-APPOINTMENT (OUTPATIENT)
Age: 59
End: 2024-10-30

## 2024-10-30 ENCOUNTER — APPOINTMENT (OUTPATIENT)
Age: 59
End: 2024-10-30
Payer: OTHER MISCELLANEOUS

## 2024-10-30 PROCEDURE — 27385 REPAIR OF THIGH MUSCLE: CPT | Mod: LT

## 2024-10-30 PROCEDURE — 27385 REPAIR OF THIGH MUSCLE: CPT | Mod: AS,LT

## 2024-10-30 RX ORDER — ASPIRIN/ACETAMINOPHEN/CAFFEINE 500-325-65
325 POWDER IN PACKET (EA) ORAL
Qty: 30 | Refills: 0 | Status: ACTIVE | COMMUNITY
Start: 2024-10-30 | End: 2024-11-29

## 2024-10-30 RX ORDER — OXYCODONE AND ACETAMINOPHEN 5; 325 MG/1; MG/1
5-325 TABLET ORAL EVERY 4 HOURS
Qty: 30 | Refills: 0 | Status: COMPLETED | COMMUNITY
Start: 2024-10-30 | End: 2024-11-04

## 2024-10-31 ENCOUNTER — NON-APPOINTMENT (OUTPATIENT)
Age: 59
End: 2024-10-31

## 2024-10-31 RX ORDER — HYDROCODONE BITARTRATE AND ACETAMINOPHEN 10; 325 MG/1; MG/1
10-325 TABLET ORAL
Qty: 10 | Refills: 0 | Status: ACTIVE | COMMUNITY
Start: 2024-10-31 | End: 1900-01-01

## 2024-11-01 DIAGNOSIS — S76.112A STRAIN OF LEFT QUADRICEPS MUSCLE, FASCIA AND TENDON, INITIAL ENCOUNTER: ICD-10-CM

## 2024-11-01 RX ORDER — HYDROCODONE BITARTRATE AND ACETAMINOPHEN 10; 325 MG/1; MG/1
10-325 TABLET ORAL
Qty: 30 | Refills: 0 | Status: COMPLETED | COMMUNITY
Start: 2024-11-01 | End: 2024-11-06

## 2024-11-07 ENCOUNTER — APPOINTMENT (OUTPATIENT)
Dept: ORTHOPEDIC SURGERY | Facility: CLINIC | Age: 59
End: 2024-11-07
Payer: OTHER MISCELLANEOUS

## 2024-11-07 ENCOUNTER — NON-APPOINTMENT (OUTPATIENT)
Age: 59
End: 2024-11-07

## 2024-11-07 VITALS — BODY MASS INDEX: 30.36 KG/M2 | WEIGHT: 205 LBS | HEIGHT: 69 IN

## 2024-11-07 PROBLEM — S76.112D RUPTURE OF LEFT QUADRICEPS TENDON, SUBSEQUENT ENCOUNTER: Status: ACTIVE | Noted: 2024-11-07

## 2024-11-07 PROCEDURE — 99024 POSTOP FOLLOW-UP VISIT: CPT

## 2024-11-14 ENCOUNTER — APPOINTMENT (OUTPATIENT)
Dept: ORTHOPEDIC SURGERY | Facility: CLINIC | Age: 59
End: 2024-11-14
Payer: OTHER MISCELLANEOUS

## 2024-11-14 VITALS — WEIGHT: 205 LBS | HEIGHT: 69 IN | BODY MASS INDEX: 30.36 KG/M2

## 2024-11-14 PROCEDURE — 99024 POSTOP FOLLOW-UP VISIT: CPT

## 2024-11-14 PROCEDURE — L1833: CPT | Mod: LT

## 2024-11-19 RX ORDER — HYDROCODONE BITARTRATE AND ACETAMINOPHEN 5; 325 MG/1; MG/1
5-325 TABLET ORAL
Qty: 10 | Refills: 0 | Status: ACTIVE | COMMUNITY
Start: 2024-11-19 | End: 1900-01-01

## 2024-11-20 ENCOUNTER — APPOINTMENT (OUTPATIENT)
Dept: ORTHOPEDIC SURGERY | Facility: AMBULATORY SURGERY CENTER | Age: 59
End: 2024-11-20
Payer: COMMERCIAL

## 2024-11-20 PROCEDURE — 64721 CARPAL TUNNEL SURGERY: CPT | Mod: LT

## 2024-11-20 PROCEDURE — 64721 CARPAL TUNNEL SURGERY: CPT | Mod: AS,LT

## 2024-11-21 ENCOUNTER — APPOINTMENT (OUTPATIENT)
Dept: ORTHOPEDIC SURGERY | Facility: CLINIC | Age: 59
End: 2024-11-21
Payer: OTHER MISCELLANEOUS

## 2024-11-21 VITALS — WEIGHT: 205 LBS | BODY MASS INDEX: 30.36 KG/M2 | HEIGHT: 69 IN

## 2024-11-21 DIAGNOSIS — S76.112D STRAIN OF LEFT QUADRICEPS MUSCLE, FASCIA AND TENDON, SUBSEQUENT ENCOUNTER: ICD-10-CM

## 2024-11-21 PROCEDURE — 99024 POSTOP FOLLOW-UP VISIT: CPT

## 2024-11-26 ENCOUNTER — APPOINTMENT (OUTPATIENT)
Dept: ORTHOPEDIC SURGERY | Facility: CLINIC | Age: 59
End: 2024-11-26
Payer: COMMERCIAL

## 2024-11-26 VITALS — BODY MASS INDEX: 30.36 KG/M2 | WEIGHT: 205 LBS | HEIGHT: 69 IN

## 2024-11-26 DIAGNOSIS — M19.042 PRIMARY OSTEOARTHRITIS, LEFT HAND: ICD-10-CM

## 2024-11-26 DIAGNOSIS — G56.02 CARPAL TUNNEL SYNDROME, LEFT UPPER LIMB: ICD-10-CM

## 2024-11-26 PROCEDURE — 99024 POSTOP FOLLOW-UP VISIT: CPT

## 2024-11-26 RX ORDER — MELOXICAM 15 MG/1
15 TABLET ORAL
Qty: 30 | Refills: 0 | Status: ACTIVE | COMMUNITY
Start: 2024-11-26 | End: 1900-01-01

## 2024-12-02 ENCOUNTER — APPOINTMENT (OUTPATIENT)
Dept: ORTHOPEDIC SURGERY | Facility: CLINIC | Age: 59
End: 2024-12-02
Payer: OTHER MISCELLANEOUS

## 2024-12-02 ENCOUNTER — NON-APPOINTMENT (OUTPATIENT)
Age: 59
End: 2024-12-02

## 2024-12-02 VITALS — HEIGHT: 69 IN | BODY MASS INDEX: 30.36 KG/M2 | WEIGHT: 205 LBS

## 2024-12-02 DIAGNOSIS — S76.112D STRAIN OF LEFT QUADRICEPS MUSCLE, FASCIA AND TENDON, SUBSEQUENT ENCOUNTER: ICD-10-CM

## 2024-12-02 PROCEDURE — 99024 POSTOP FOLLOW-UP VISIT: CPT

## 2024-12-03 ENCOUNTER — APPOINTMENT (OUTPATIENT)
Dept: PODIATRY | Facility: CLINIC | Age: 59
End: 2024-12-03

## 2024-12-03 DIAGNOSIS — M20.40 OTHER HAMMER TOE(S) (ACQUIRED), UNSPECIFIED FOOT: ICD-10-CM

## 2024-12-03 DIAGNOSIS — M79.675 PAIN IN LEFT TOE(S): ICD-10-CM

## 2024-12-03 PROCEDURE — 99213 OFFICE O/P EST LOW 20 MIN: CPT

## 2024-12-04 ENCOUNTER — APPOINTMENT (OUTPATIENT)
Dept: ORTHOPEDIC SURGERY | Facility: AMBULATORY SURGERY CENTER | Age: 59
End: 2024-12-04

## 2024-12-10 ENCOUNTER — APPOINTMENT (OUTPATIENT)
Dept: ORTHOPEDIC SURGERY | Facility: CLINIC | Age: 59
End: 2024-12-10
Payer: COMMERCIAL

## 2024-12-10 PROCEDURE — 99024 POSTOP FOLLOW-UP VISIT: CPT

## 2024-12-10 RX ORDER — METHYLPREDNISOLONE 4 MG/1
4 TABLET ORAL
Qty: 1 | Refills: 0 | Status: ACTIVE | COMMUNITY
Start: 2024-12-10 | End: 1900-01-01

## 2024-12-11 ENCOUNTER — OUTPATIENT (OUTPATIENT)
Dept: OUTPATIENT SERVICES | Facility: HOSPITAL | Age: 59
LOS: 1 days | End: 2024-12-11
Payer: COMMERCIAL

## 2024-12-11 VITALS
SYSTOLIC BLOOD PRESSURE: 151 MMHG | RESPIRATION RATE: 18 BRPM | WEIGHT: 195.11 LBS | TEMPERATURE: 98 F | OXYGEN SATURATION: 96 % | DIASTOLIC BLOOD PRESSURE: 90 MMHG | HEIGHT: 69 IN | HEART RATE: 70 BPM

## 2024-12-11 DIAGNOSIS — M77.42 METATARSALGIA, LEFT FOOT: ICD-10-CM

## 2024-12-11 DIAGNOSIS — Z98.890 OTHER SPECIFIED POSTPROCEDURAL STATES: Chronic | ICD-10-CM

## 2024-12-11 DIAGNOSIS — Z91.89 OTHER SPECIFIED PERSONAL RISK FACTORS, NOT ELSEWHERE CLASSIFIED: ICD-10-CM

## 2024-12-11 DIAGNOSIS — I10 ESSENTIAL (PRIMARY) HYPERTENSION: ICD-10-CM

## 2024-12-11 DIAGNOSIS — Z01.818 ENCOUNTER FOR OTHER PREPROCEDURAL EXAMINATION: ICD-10-CM

## 2024-12-11 NOTE — H&P PST ADULT - PROBLEM SELECTOR PLAN 2
Advised patient to continue regimen Amlodipine 10mg daily   Patient advised with understanding verbalized to take Amlodipine with a sip of water the morning of surgery.   Follow up with PCP/Cardiologist postoperatively

## 2024-12-11 NOTE — H&P PST ADULT - NSANTHSNORERD_ENT_A_CORE
CHILD/ADOLESCENT PSYCHIATRY   FOLLOW UP NOTE     Time Spent is 21 total minutes in evaluation and management    Patient was seen for video  visit      Identifying Information  Brandan Ureña Is a 9 year old female, referred by Dr. Quintanilla     Chief Complaint  Anxiety, attention issues       History of Present Illness  Chart review conducted prior to interview.  We discussed progress since we had last met  Both patient and mother report significant improvement in focusing as well as with her frustration tolerance and management of anger since having started the guanfacine.  No side effects from medication are noted.     Patient denies difficulties with persistent depressed mood, anhedonia, hopelessness, worthlessness, change in energy, suicidal or self harm ideation, or homicidal thoughts. No psychotic symptoms are manic symptoms are noted.  No other present complaints.             Psychiatric History  Patient presently sees Lauren at [facility] for [facility]; patient had seen Yovany for therapy services from July 2020 until September 2021 due to aggressive behaviors.  No inpatient psychiatric history or medication treatment history.       Medical and Developmental History  Active Medical Problems:   Active Ambulatory Problems     Diagnosis Date Noted   • No Active Ambulatory Problems     Resolved Ambulatory Problems     Diagnosis Date Noted   • Vaginal adhesions 06/12/2018   • Behavior hyperactive 09/15/2020   • Outbursts of anger 09/15/2020     No Additional Past Medical History         Primary Care Physician: Leydi Quintanilla MD       ALLERGIES:   Allergen Reactions   • Amoxicillin RASH     Per mom         Current medications  Current Outpatient Medications   Medication Sig   • guanFACINE (TENEX) 1 MG tablet Take 0.5 tablets by mouth 2 times daily as needed (anxiety, attention).     No current facility-administered medications for this visit.               Developmental and Personal  History  Patient did have delay in speech with not talking much until the age of 2.  Mother had gestational diabetes during pregnancy.  No other complications with pregnancy or developmental delays are reported.       Family Psychiatric and Substance History  History of depression, anxiety and alcoholism on father's side of the family.  History of alcoholism on mother's side of the family.         Social History  Patient lives with both parents, Johnny and Chandrika; father is the owner of an IT solutions company, and mother stays at home.  She has a full sibling who is 4 years her senior, named Gorge with whom she reports a fair relationship.    Patient attends [employer] in [facility] [facility], going into 4th grade.  She is described as having friends in school and denies any bullying.  No trauma history is reported although patient's father has been diagnosed with cancer, and patient and family had lived in [place] with paternal grandparents for approximately 10.5 months from October 2021 until July 2022.         Mental Status Exam  Appearance: Normal grooming hygiene, appears stated age, in appropriate clothing.  Behavior: generally cooperative with interview,  fair engagement with normal eye contact.  Motor: No psychomotor agitation or retardation. No movement abnormalities, tics, or stereotypies noted. Normal gait and station.  Speech: Constricted tone. Normal volume, rate, and vocabulary.  Mood: \"Fine.\"  Affect: Generally constricted though stable.  Thought process: Linear and goal directed.  Thought content: The patient denies suicidal or homicidal ideation, self-harm ideation, or psychotic symptoms.  Insight and Judgment: Limited.  Cognitive: Generally normal with respect to attention, fund of knowledge, and language.       There were no vitals taken for this visit.         Diagnostic Assessment:  Axis I - Clinical: Unspecified anxiety disorder  Combined type ADHD    Axis II - Personality/  MR:  Deferred    Axis III - Medical:  None    Axis IV - Psycho/Social/Environment:  Father's cancer diagnosis and recent move, some school stressors    Axis V - Global Assessement: 65-70       Treatment Plan/Recommendations:  -Psychotherapy: Discussed continued engagement in psychotherapy services    -Pharmacotherapy: Continue guanfacine 0.5 mg twice a day for ADHD and anxiety    -Discussed considering: Saffron supplement: 30mg per day  Omega 3 fatty acids (fish oil or flaxseed oil caps): 1000mg/day     -Go to ER/call 911 if any SI/HI/AVH or in case of any emergency.  Patient and parents agree to call or contact writer with any problems, questions, concerns, or worsening in mood or behavior.     -Provided education on diagnosis and available treatment options and answered patient and parents questions thereof.  Writer provided supportive listening and reassurance.     -Pt to follow up in with writer in 6 weeks once testing is completed   Brandan Ureña is to followup with therapist as scheduled.        Ephraim Giles MD  Child and Adolescent Psychiatry  [facility]  [place], [place]     No

## 2024-12-11 NOTE — H&P PST ADULT - NSICDXPASTSURGICALHX_GEN_ALL_CORE_FT
PAST SURGICAL HISTORY:  History of bunionectomy     History of carpal tunnel release     S/P arthroscopy of shoulder     S/P left knee surgery

## 2024-12-11 NOTE — H&P PST ADULT - NSICDXPASTMEDICALHX_GEN_ALL_CORE_FT
PAST MEDICAL HISTORY:  Carpal tunnel syndrome     GERD (gastroesophageal reflux disease)     Hyperlipidemia     Hypertension     Tendon tear

## 2024-12-11 NOTE — H&P PST ADULT - MUSCULOSKELETAL
Left knee and left hand/no calf tenderness/decreased ROM due to pain/joint swelling/strength 5/5 bilateral upper extremities/decreased strength details…

## 2024-12-11 NOTE — H&P PST ADULT - MUSCULOSKELETAL COMMENTS
Hx eft knee quadriceps tendon tear, right knee meniscus tear, bilateral shoulder rotator cuff tear , bilateral feet bunion,  left wrist carpal tunnel syndrome, - s/p carpal tunnel release Metatarsalgia of left foot

## 2024-12-11 NOTE — H&P PST ADULT - HISTORY OF PRESENT ILLNESS
59year old male with pmhc of hypertension, hyperlipidemia, GERD, left knee quadriceps tendon tear, right knee meniscus tear, bilateral shoulder rotator cuff tear , bilateral feet bunion,  left wrist carpal tunnel syndrome presents with c/o left foot pain and swelling since. Patient is here today for presurgical testing for scheduled left foot arthroplasties of 2nd, 3rd, 4th and 5th digits with condylectomy on 12/19/2024

## 2024-12-11 NOTE — H&P PST ADULT - PROBLEM SELECTOR PLAN 3
Patient today with STOP bang score of 3, Intermediate risk for SABINE   Patient denies current hx/dx of SABINE/Sleep Study Test   Recommend maintaining perioperative SABINE risk precautions.

## 2024-12-11 NOTE — H&P PST ADULT - NSICDXPROCEDURE_GEN_ALL_CORE_FT
PROCEDURES:  Correction of hammertoe 11-Dec-2024 10:32:18  July Pina  Complete ostectomy of first metatarsal head 11-Dec-2024 10:33:21  July Pina

## 2024-12-11 NOTE — H&P PST ADULT - ATTENDING COMMENTS
Patient here for painful hammertoe deformity 2 and 5 and deep 4th interspace keratosis requiring plasty at 4th toe base and 4th met condylectomy all left foot.  Again reviewed surgical procedures to be performed. Discussed post op course and possible complications. Discussed continued pain syndrome, infection, DVT, AVN, over-, under- correction, recurrence, numbness, burning, delayed healing or wound issues with the patient. After all questions were answered, the patient signed the consent.

## 2024-12-12 PROCEDURE — G0463: CPT

## 2024-12-16 ENCOUNTER — APPOINTMENT (OUTPATIENT)
Dept: ORTHOPEDIC SURGERY | Facility: CLINIC | Age: 59
End: 2024-12-16
Payer: OTHER MISCELLANEOUS

## 2024-12-16 ENCOUNTER — NON-APPOINTMENT (OUTPATIENT)
Age: 59
End: 2024-12-16

## 2024-12-16 VITALS — HEIGHT: 69 IN | WEIGHT: 190 LBS | BODY MASS INDEX: 28.14 KG/M2

## 2024-12-16 DIAGNOSIS — M65.969 UNSP SYNOVITIS AND TENOSYNOVITIS, UNSPECIFIED LOWER LEG: ICD-10-CM

## 2024-12-16 DIAGNOSIS — S76.112D STRAIN OF LEFT QUADRICEPS MUSCLE, FASCIA AND TENDON, SUBSEQUENT ENCOUNTER: ICD-10-CM

## 2024-12-16 DIAGNOSIS — S83.242D OTHER TEAR OF MEDIAL MENISCUS, CURRENT INJURY, LEFT KNEE, SUBSEQUENT ENCOUNTER: ICD-10-CM

## 2024-12-16 PROBLEM — T14.8XXA OTHER INJURY OF UNSPECIFIED BODY REGION, INITIAL ENCOUNTER: Chronic | Status: ACTIVE | Noted: 2024-12-11

## 2024-12-16 PROBLEM — I10 ESSENTIAL (PRIMARY) HYPERTENSION: Chronic | Status: ACTIVE | Noted: 2024-12-11

## 2024-12-16 PROBLEM — G56.00 CARPAL TUNNEL SYNDROME, UNSPECIFIED UPPER LIMB: Chronic | Status: ACTIVE | Noted: 2024-12-11

## 2024-12-16 PROBLEM — E78.5 HYPERLIPIDEMIA, UNSPECIFIED: Chronic | Status: ACTIVE | Noted: 2024-12-11

## 2024-12-16 PROCEDURE — 99024 POSTOP FOLLOW-UP VISIT: CPT

## 2024-12-17 ENCOUNTER — APPOINTMENT (OUTPATIENT)
Dept: ORTHOPEDIC SURGERY | Facility: CLINIC | Age: 59
End: 2024-12-17
Payer: COMMERCIAL

## 2024-12-17 VITALS — BODY MASS INDEX: 28.14 KG/M2 | HEIGHT: 69 IN | WEIGHT: 190 LBS

## 2024-12-17 DIAGNOSIS — G56.02 CARPAL TUNNEL SYNDROME, LEFT UPPER LIMB: ICD-10-CM

## 2024-12-17 DIAGNOSIS — M19.042 PRIMARY OSTEOARTHRITIS, LEFT HAND: ICD-10-CM

## 2024-12-17 DIAGNOSIS — M77.8 OTHER ENTHESOPATHIES, NOT ELSEWHERE CLASSIFIED: ICD-10-CM

## 2024-12-17 PROCEDURE — 99024 POSTOP FOLLOW-UP VISIT: CPT

## 2024-12-17 PROCEDURE — 20600 DRAIN/INJ JOINT/BURSA W/O US: CPT | Mod: F1

## 2024-12-17 PROCEDURE — 20526 THER INJECTION CARP TUNNEL: CPT | Mod: LT

## 2024-12-17 PROCEDURE — J3490M: CUSTOM

## 2024-12-17 RX ORDER — GABAPENTIN 300 MG/1
300 CAPSULE ORAL
Qty: 30 | Refills: 0 | Status: ACTIVE | COMMUNITY
Start: 2024-12-17 | End: 1900-01-01

## 2024-12-19 ENCOUNTER — APPOINTMENT (OUTPATIENT)
Dept: PODIATRY | Facility: HOSPITAL | Age: 59
End: 2024-12-19

## 2024-12-19 ENCOUNTER — TRANSCRIPTION ENCOUNTER (OUTPATIENT)
Age: 59
End: 2024-12-19

## 2024-12-19 ENCOUNTER — RESULT REVIEW (OUTPATIENT)
Age: 59
End: 2024-12-19

## 2024-12-19 ENCOUNTER — OUTPATIENT (OUTPATIENT)
Dept: OUTPATIENT SERVICES | Facility: HOSPITAL | Age: 59
LOS: 1 days | End: 2024-12-19
Payer: COMMERCIAL

## 2024-12-19 VITALS
OXYGEN SATURATION: 98 % | DIASTOLIC BLOOD PRESSURE: 77 MMHG | SYSTOLIC BLOOD PRESSURE: 145 MMHG | HEART RATE: 57 BPM | TEMPERATURE: 98 F | RESPIRATION RATE: 14 BRPM

## 2024-12-19 VITALS
DIASTOLIC BLOOD PRESSURE: 75 MMHG | HEART RATE: 59 BPM | OXYGEN SATURATION: 97 % | WEIGHT: 195.11 LBS | RESPIRATION RATE: 16 BRPM | SYSTOLIC BLOOD PRESSURE: 132 MMHG | TEMPERATURE: 98 F | HEIGHT: 69 IN

## 2024-12-19 DIAGNOSIS — Z98.890 OTHER SPECIFIED POSTPROCEDURAL STATES: Chronic | ICD-10-CM

## 2024-12-19 DIAGNOSIS — M77.42 METATARSALGIA, LEFT FOOT: ICD-10-CM

## 2024-12-19 PROCEDURE — 28285 REPAIR OF HAMMERTOE: CPT | Mod: T1,XU

## 2024-12-19 PROCEDURE — 28232 INCISION OF TOE TENDON: CPT | Mod: T1

## 2024-12-19 PROCEDURE — 88311 DECALCIFY TISSUE: CPT

## 2024-12-19 PROCEDURE — 28288 PARTIAL REMOVAL OF FOOT BONE: CPT | Mod: T3

## 2024-12-19 PROCEDURE — 88304 TISSUE EXAM BY PATHOLOGIST: CPT | Mod: 26

## 2024-12-19 PROCEDURE — 28112 PART REMOVAL OF METATARSAL: CPT | Mod: LT,59

## 2024-12-19 PROCEDURE — 28010 INCISION OF TOE TENDON: CPT | Mod: T4,59

## 2024-12-19 PROCEDURE — 73630 X-RAY EXAM OF FOOT: CPT | Mod: 26,LT

## 2024-12-19 PROCEDURE — 88304 TISSUE EXAM BY PATHOLOGIST: CPT

## 2024-12-19 PROCEDURE — 73630 X-RAY EXAM OF FOOT: CPT

## 2024-12-19 PROCEDURE — 88311 DECALCIFY TISSUE: CPT | Mod: 26

## 2024-12-19 PROCEDURE — 28285 REPAIR OF HAMMERTOE: CPT | Mod: T4,59

## 2024-12-19 RX ORDER — OXYCODONE AND ACETAMINOPHEN 5; 325 MG/1; MG/1
5-325 TABLET ORAL
Qty: 15 | Refills: 0 | Status: ACTIVE | COMMUNITY
Start: 2024-12-19 | End: 1900-01-01

## 2024-12-19 RX ORDER — SODIUM CHLORIDE 9 MG/ML
3 INJECTION, SOLUTION INTRAMUSCULAR; INTRAVENOUS; SUBCUTANEOUS EVERY 8 HOURS
Refills: 0 | Status: DISCONTINUED | OUTPATIENT
Start: 2024-12-19 | End: 2024-12-19

## 2024-12-19 RX ORDER — FENTANYL 12 UG/H
25 PATCH, EXTENDED RELEASE TRANSDERMAL
Refills: 0 | Status: DISCONTINUED | OUTPATIENT
Start: 2024-12-19 | End: 2024-12-19

## 2024-12-19 RX ORDER — ACETAMINOPHEN 500MG 500 MG/1
975 TABLET, COATED ORAL ONCE
Refills: 0 | Status: COMPLETED | OUTPATIENT
Start: 2024-12-19 | End: 2024-12-19

## 2024-12-19 RX ORDER — AMLODIPINE BESYLATE 10 MG/1
1 TABLET ORAL
Refills: 0 | DISCHARGE

## 2024-12-19 RX ORDER — HYDROMORPHONE HYDROCHLORIDE 2 MG/1
0.5 TABLET ORAL
Refills: 0 | Status: DISCONTINUED | OUTPATIENT
Start: 2024-12-19 | End: 2024-12-19

## 2024-12-19 RX ORDER — ROSUVASTATIN CALCIUM 5 MG/1
1 TABLET, FILM COATED ORAL
Refills: 0 | DISCHARGE

## 2024-12-19 RX ADMIN — ACETAMINOPHEN 500MG 975 MILLIGRAM(S): 500 TABLET, COATED ORAL at 06:37

## 2024-12-19 NOTE — ASU DISCHARGE PLAN (ADULT/PEDIATRIC) - FINANCIAL ASSISTANCE
[facility] provides services at a reduced cost to those who are determined to be eligible through [facility]’s financial assistance program. Information regarding [facility]’s financial assistance program can be found by going to [email or web address] or by calling [phone].

## 2024-12-19 NOTE — BRIEF OPERATIVE NOTE - NSICDXBRIEFPROCEDURE_GEN_ALL_CORE_FT
PROCEDURES:  Arthroplasty, foot or toe 19-Dec-2024 09:01:19  Boogie Potter  Condylectomy 19-Dec-2024 09:01:27  Boogie Potter

## 2024-12-19 NOTE — ASU PATIENT PROFILE, ADULT - TEACHING/LEARNING LEARNING PREFERENCES
verbal instruction PROBLEM DIAGNOSES  Problem: Benign neoplasm of nasopharynx  Assessment and Plan: JNA Embolization  -cbc, bmp, pt/ptt/inr, type and screen done at PST  -preop instructions  -ABO DOS

## 2024-12-19 NOTE — ASU PATIENT PROFILE, ADULT - FALL HARM RISK - RISK INTERVENTIONS

## 2024-12-19 NOTE — ASU PATIENT PROFILE, ADULT - NSTOBACCONEVERSMOKERY/N_GEN_A
Physical Therapy Daily Treatment    Visit Count: 8    Plan of Care: 4/13/2018 Through: 7/6/2018  Insurance Information: PAYOR: MEDICARE  AUTHORIZATION NEEDED: NO, FOLLOW MEDICARE GUIDELINES     Referred by: Froylan Benjamin MD; Next provider visit (if known/scheduled): TBA  Medical Diagnosis (from order): M54.31 Right sided sciatica   Treatment Diagnosis: Lumbar symptoms with Pain, Impaired Posture, Impaired Joint Mobility  SUBJECTIVE   Pt reports that [place]. She notes with increased 2 points increase humidity warmer weather that her back and joints really have been sore. She states she's fallen off her exercise program and would like to review her exercises.  Current Pain (0-10 scale): 4  Functional Change:     OBJETIVE   Active range of motion of the spine was approximately 70% of normal range of motion on cardinal plane movements. Patient ambulated with a less gait pattern with his forward flexion bias. . Assist straight leg raise 40° bilaterally without back pain. Posttreatment passive straight leg raise 65° active range of motion 65% of normal range of motion  Treatment   Therapeutic Exercise:   Patient was seen for review of her home program of heel slides abdominal bracing due to bracing butterfly stretches single hip rollouts and wrapping to her right thigh.    Manual Therapy:   Continue to work on fascial releases through the lumbar region and the sacral region as well as joint mobilizations to the ilium's and the hip joints.    Home Program:   As above      ASSESSMENT   Patient continues to make steady recovery towards her. Goals. Patient having difficulties with compliance with her home program which we verbally reviewed during treatment. Patient demonstrated good technique with home program and patient needs to be convinced to continue to participate in her home program. We discussed these points and patient will recommence in her home program.  Pain after treatment  (patient reported, 0-10 scale): 2  Result of above outlined education: Verbalizes understanding and Demonstrates understanding        Goals:  To be obtained by end of this plan of care:  1. Patient independent with modified and progressed home exercise program.  2. Patient will report decreased lumbar pain/symptoms to 1/10 to aid in returning to community activities and age appropriate activities.   3. Patient will increase lumbar active range of motion to within normal limits to aid in normalization of gait for independent living, returning to community activities and age appropriate activities.  4. Patient will increase involved strength to 5/5 to aid in normalization of gait for independent living, returning to community activities and age appropriate activities.  Patient will demonstrate proper body mechanics for sitting and standing     PLAN    Next session: As above    THERAPY DAILY BILLING   Insurance: MEDICARE 2. [employer]    Evaluation Procedures:  No evaluation codes were used on this date of service    Timed Procedures:  Manual Therapy, 20 minutes  Therapeutic Exercise, 20 minutes    Untimed Procedures:  No untimed codes were used on this date of service    Total Treatment Time: 40 minutes         No

## 2024-12-19 NOTE — ASU DISCHARGE PLAN (ADULT/PEDIATRIC) - CARE PROVIDER_API CALL
Frank Rivera  [facility]  [address] [place] [address]  Phone: [phone]  Fax: [phone]  Follow Up Time:

## 2024-12-19 NOTE — BRIEF OPERATIVE NOTE - OPERATION/FINDINGS
Left foot 2nd and 5th digit arthroplasty. 4th metatarsal condylectomy, flexor tenotomy of digits 2,5 at the DIPJ. See operative report

## 2024-12-21 ENCOUNTER — APPOINTMENT (OUTPATIENT)
Dept: MRI IMAGING | Facility: CLINIC | Age: 59
End: 2024-12-21
Payer: COMMERCIAL

## 2024-12-21 PROCEDURE — 73221 MRI JOINT UPR EXTREM W/O DYE: CPT | Mod: LT

## 2024-12-21 PROCEDURE — 73218 MRI UPPER EXTREMITY W/O DYE: CPT | Mod: LT

## 2024-12-24 ENCOUNTER — APPOINTMENT (OUTPATIENT)
Dept: PODIATRY | Facility: CLINIC | Age: 59
End: 2024-12-24
Payer: COMMERCIAL

## 2024-12-24 PROCEDURE — 99024 POSTOP FOLLOW-UP VISIT: CPT

## 2024-12-30 LAB — SURGICAL PATHOLOGY STUDY: SIGNIFICANT CHANGE UP

## 2024-12-31 ENCOUNTER — APPOINTMENT (OUTPATIENT)
Dept: PODIATRY | Facility: CLINIC | Age: 59
End: 2024-12-31

## 2024-12-31 PROCEDURE — 99024 POSTOP FOLLOW-UP VISIT: CPT

## 2025-01-02 ENCOUNTER — APPOINTMENT (OUTPATIENT)
Dept: ORTHOPEDIC SURGERY | Facility: CLINIC | Age: 60
End: 2025-01-02
Payer: COMMERCIAL

## 2025-01-02 VITALS — HEIGHT: 69 IN | WEIGHT: 190 LBS | BODY MASS INDEX: 28.14 KG/M2

## 2025-01-02 DIAGNOSIS — G56.02 CARPAL TUNNEL SYNDROME, LEFT UPPER LIMB: ICD-10-CM

## 2025-01-02 PROCEDURE — 99024 POSTOP FOLLOW-UP VISIT: CPT

## 2025-01-03 PROBLEM — Z48.89 ENCOUNTER FOR SURGICAL FOLLOW-UP CARE: Status: ACTIVE | Noted: 2025-01-02

## 2025-01-06 ENCOUNTER — APPOINTMENT (OUTPATIENT)
Dept: ORTHOPEDIC SURGERY | Facility: CLINIC | Age: 60
End: 2025-01-06
Payer: OTHER MISCELLANEOUS

## 2025-01-06 ENCOUNTER — APPOINTMENT (OUTPATIENT)
Dept: PAIN MANAGEMENT | Facility: CLINIC | Age: 60
End: 2025-01-06
Payer: COMMERCIAL

## 2025-01-06 VITALS — HEIGHT: 69 IN | BODY MASS INDEX: 28.88 KG/M2 | WEIGHT: 195 LBS

## 2025-01-06 VITALS — HEIGHT: 69 IN | WEIGHT: 195 LBS | BODY MASS INDEX: 28.88 KG/M2

## 2025-01-06 DIAGNOSIS — M17.12 UNILATERAL PRIMARY OSTEOARTHRITIS, LEFT KNEE: ICD-10-CM

## 2025-01-06 DIAGNOSIS — S76.112D STRAIN OF LEFT QUADRICEPS MUSCLE, FASCIA AND TENDON, SUBSEQUENT ENCOUNTER: ICD-10-CM

## 2025-01-06 DIAGNOSIS — M79.18 MYALGIA, OTHER SITE: ICD-10-CM

## 2025-01-06 DIAGNOSIS — M65.969 UNSP SYNOVITIS AND TENOSYNOVITIS, UNSPECIFIED LOWER LEG: ICD-10-CM

## 2025-01-06 DIAGNOSIS — M54.2 CERVICALGIA: ICD-10-CM

## 2025-01-06 DIAGNOSIS — M54.50 LOW BACK PAIN, UNSPECIFIED: ICD-10-CM

## 2025-01-06 DIAGNOSIS — S83.242D OTHER TEAR OF MEDIAL MENISCUS, CURRENT INJURY, LEFT KNEE, SUBSEQUENT ENCOUNTER: ICD-10-CM

## 2025-01-06 PROCEDURE — 99214 OFFICE O/P EST MOD 30 MIN: CPT | Mod: 25

## 2025-01-06 PROCEDURE — J3490M: CUSTOM

## 2025-01-06 PROCEDURE — 20552 NJX 1/MLT TRIGGER POINT 1/2: CPT

## 2025-01-06 PROCEDURE — 99024 POSTOP FOLLOW-UP VISIT: CPT

## 2025-01-08 ENCOUNTER — APPOINTMENT (OUTPATIENT)
Dept: PODIATRY | Facility: CLINIC | Age: 60
End: 2025-01-08

## 2025-01-08 DIAGNOSIS — Z48.89 ENCOUNTER FOR OTHER SPECIFIED SURGICAL AFTERCARE: ICD-10-CM

## 2025-01-08 DIAGNOSIS — M20.40 OTHER HAMMER TOE(S) (ACQUIRED), UNSPECIFIED FOOT: ICD-10-CM

## 2025-01-08 PROCEDURE — 73620 X-RAY EXAM OF FOOT: CPT | Mod: LT

## 2025-01-08 RX ORDER — HYDROCORTISONE 1 %
12 CREAM (GRAM) TOPICAL TWICE DAILY
Qty: 1 | Refills: 3 | Status: ACTIVE | COMMUNITY
Start: 2025-01-08 | End: 1900-01-01

## 2025-01-09 ENCOUNTER — APPOINTMENT (OUTPATIENT)
Dept: PAIN MANAGEMENT | Facility: CLINIC | Age: 60
End: 2025-01-09
Payer: COMMERCIAL

## 2025-01-09 DIAGNOSIS — M54.17 RADICULOPATHY, LUMBOSACRAL REGION: ICD-10-CM

## 2025-01-09 PROCEDURE — 62323 NJX INTERLAMINAR LMBR/SAC: CPT

## 2025-01-17 ENCOUNTER — APPOINTMENT (OUTPATIENT)
Dept: PAIN MANAGEMENT | Facility: CLINIC | Age: 60
End: 2025-01-17
Payer: COMMERCIAL

## 2025-01-17 DIAGNOSIS — M54.12 RADICULOPATHY, CERVICAL REGION: ICD-10-CM

## 2025-01-17 PROCEDURE — 62321 NJX INTERLAMINAR CRV/THRC: CPT

## 2025-01-23 ENCOUNTER — APPOINTMENT (OUTPATIENT)
Dept: ORTHOPEDIC SURGERY | Facility: CLINIC | Age: 60
End: 2025-01-23

## 2025-01-23 ENCOUNTER — NON-APPOINTMENT (OUTPATIENT)
Age: 60
End: 2025-01-23

## 2025-01-23 ENCOUNTER — EMERGENCY (EMERGENCY)
Facility: HOSPITAL | Age: 60
LOS: 0 days | Discharge: HOME | End: 2025-01-23
Attending: STUDENT IN AN ORGANIZED HEALTH CARE EDUCATION/TRAINING PROGRAM
Payer: COMMERCIAL

## 2025-01-23 ENCOUNTER — APPOINTMENT (OUTPATIENT)
Dept: ORTHOPEDIC SURGERY | Facility: CLINIC | Age: 60
End: 2025-01-23
Payer: COMMERCIAL

## 2025-01-23 VITALS
SYSTOLIC BLOOD PRESSURE: 145 MMHG | TEMPERATURE: 98 F | RESPIRATION RATE: 20 BRPM | DIASTOLIC BLOOD PRESSURE: 78 MMHG | WEIGHT: 205.03 LBS | OXYGEN SATURATION: 94 % | HEART RATE: 66 BPM | HEIGHT: 69 IN

## 2025-01-23 VITALS — HEIGHT: 69 IN | BODY MASS INDEX: 30.36 KG/M2 | WEIGHT: 205 LBS

## 2025-01-23 DIAGNOSIS — Z98.890 OTHER SPECIFIED POSTPROCEDURAL STATES: Chronic | ICD-10-CM

## 2025-01-23 DIAGNOSIS — M25.561 PAIN IN RIGHT KNEE: ICD-10-CM

## 2025-01-23 DIAGNOSIS — I10 ESSENTIAL (PRIMARY) HYPERTENSION: ICD-10-CM
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
## 2025-01-23 DIAGNOSIS — S89.81XA OTHER SPECIFIED INJURIES OF RIGHT LOWER LEG, INITIAL ENCOUNTER: ICD-10-CM
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
## 2025-01-23 DIAGNOSIS — S80.01XA CONTUSION OF RIGHT KNEE, INITIAL ENCOUNTER: ICD-10-CM

## 2025-01-23 DIAGNOSIS — S76.111A STRAIN OF RIGHT QUADRICEPS MUSCLE, FASCIA AND TENDON, INITIAL ENCOUNTER: ICD-10-CM

## 2025-01-23 DIAGNOSIS — M54.50 LOW BACK PAIN, UNSPECIFIED: ICD-10-CM

## 2025-01-23 PROCEDURE — 99284 EMERGENCY DEPT VISIT MOD MDM: CPT

## 2025-01-23 PROCEDURE — 73564 X-RAY EXAM KNEE 4 OR MORE: CPT | Mod: 26,RT

## 2025-01-23 PROCEDURE — 73552 X-RAY EXAM OF FEMUR 2/>: CPT | Mod: 26,RT

## 2025-01-23 PROCEDURE — 99214 OFFICE O/P EST MOD 30 MIN: CPT | Mod: 24

## 2025-01-23 PROCEDURE — 73590 X-RAY EXAM OF LOWER LEG: CPT | Mod: 26,RT

## 2025-01-23 RX ORDER — LIDOCAINE 50 MG/G
1 OINTMENT TOPICAL ONCE
Refills: 0 | Status: COMPLETED | OUTPATIENT
Start: 2025-01-23 | End: 2025-01-23

## 2025-01-23 RX ORDER — LIDOCAINE 50 MG/G
1 OINTMENT TOPICAL
Qty: 2 | Refills: 0
Start: 2025-01-23 | End: 2025-02-01

## 2025-01-23 RX ORDER — IBUPROFEN 200 MG
1 TABLET ORAL
Qty: 15 | Refills: 0
Start: 2025-01-23 | End: 2025-01-27

## 2025-01-23 RX ORDER — IBUPROFEN 200 MG
600 TABLET ORAL ONCE
Refills: 0 | Status: COMPLETED | OUTPATIENT
Start: 2025-01-23 | End: 2025-01-23

## 2025-01-23 RX ORDER — ACETAMINOPHEN 80 MG/.8ML
975 SOLUTION/ DROPS ORAL ONCE
Refills: 0 | Status: COMPLETED | OUTPATIENT
Start: 2025-01-23 | End: 2025-01-23

## 2025-01-23 RX ADMIN — ACETAMINOPHEN 975 MILLIGRAM(S): 80 SOLUTION/ DROPS ORAL at 10:46

## 2025-01-23 RX ADMIN — Medication 600 MILLIGRAM(S): at 10:46

## 2025-01-23 RX ADMIN — LIDOCAINE 1 PATCH: 50 OINTMENT TOPICAL at 10:46

## 2025-01-23 NOTE — ED PROVIDER NOTE - NSFOLLOWUPINSTRUCTIONS_ED_ALL_ED_FT
You were seen today for right knee pain    We saw an avulsion fracture of the right patella with probable quadriceps tendon injury - full xray radiology report is pending    We recommend knee immobilizer, follow up with your orthopedist, elevate leg to decrease swelling.     Leave knee immobilizer on until cleared by your orthopedic surgeon    You may require further testing/imaging.     If you develop any severe pain, swelling, fever or worsening symptoms or numbness/weakness then return immediately to the hospital    can use crutches for support. Ice to decrease swelling    For pain control - can take the following   Take ibuprofen 600 mg every 8 hours as needed for pain with food and plenty of water for up to 5 days  Take tylenol 650 mg every 6 hours as needed for pain  Use local lidocaine patch 12 hours on and 12 hours off for local relief of pain     Follow up with your primary doctor in 1-2 days  Follow up with your orthopedist     Return to the emergency department for blood in urine, worsening loss of bladder control/loss of bowel control, fever, vomiting, inability to walk, weakness/numbness, or if you have any new or changing symptoms or concerns.

## 2025-01-23 NOTE — ED PROVIDER NOTE - CLINICAL SUMMARY MEDICAL DECISION MAKING FREE TEXT BOX
59M pmhx HTN, who presents for eval of right knee pain with difficulty ranging right knee after slip and fall on ice - states landed onto his right side. Denies head injury/LOC or headache. Notes low back pain, nonradicular without associated nausea/vomiting/ cp/sob. Denies numbness.  States he felt a 'pop ' in the right knee during fall  exam without signs of patella dislocation, no joint laxity, do not suspect knee dislocation given mechanism, neurovasc intact, cannot rule out ACL tear or quadriceps tendon injury but is able to extend right knee, anticipated knee immobilizer and analgesia and outpt ortho follow up . + ttp lumbar region, ct rule out fracture , no red flags for cord compression

## 2025-01-23 NOTE — ED PROVIDER NOTE - PATIENT PORTAL LINK FT
You can access the FollowMyHealth Patient Portal offered by [facility] by registering at the following website: [email or web address]. By joining [employer]’s FollowMyHealth portal, you will also be able to view your health information using other applications (apps) compatible with our system.

## 2025-01-23 NOTE — ED PROVIDER NOTE - NS ED ATTENDING STATEMENT MOD
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 Pulmonary consult called for possible thoracentesis.  - CT chest reviewed showing right mainstem occlusion and resultant atelectasis due to progression of cancer 61 year old male with right renal cell carcinoma with metastasis to the brain, lungs, and bones (s/p radiation and chemotherapy), PE s/p IVC filter not on anticoagulation due to hemorrhagic brain mets with shortness of breath.   CT chest reviewed showing right mainstem occlusion and resultant atelectasis due to progression of cancer with surrounding pleural effusion. Pulmonary consult called for possible thoracentesis. As there is complete occlusion of mainstem bronchus, a thoracentesis will not be therapeutic as the lungs will not be expandable. As the procedure would likely cause pain without benefit, no plan for thoracentesis at this time. Recommend continuing symptom control. Attending Only

## 2025-01-23 NOTE — ED PROVIDER NOTE - PROGRESS NOTE DETAILS
Valencia DO: pt placed in knee immobilizer, ambulatory, will provide crutches for added support, pt plans to see his orthopedist upon leaving ED, no midline L spine ttp on re-eval, normal ROM, ct lumbar spine cancelled as no longer indicated as low suspicion for fx

## 2025-01-23 NOTE — ED ADULT NURSE NOTE - OBJECTIVE STATEMENT
Patient presents to ED post fall from home around 8Am this morning, mild bruise to knee and swelling to right knee. Also complaining of right side lower back, no injuries noted.

## 2025-01-23 NOTE — ED PROVIDER NOTE - PHYSICAL EXAMINATION
Gen: aox3, NAD   Head: NCAT  ENT: Airway patent, moist mucous membranes, nasal passageways clear   Cardiac: Normal rate, normal rhythm   Respiratory: Lungs CTA B/L  Gastrointestinal: Abdomen soft, nontender, nondistended, no rebound, no guarding  MSK: No gross abnormalities,  HEME: Extremities warm, pulses intact and symmetrical in all four extremities  Skin: No rashes, no lesions  Neuro: No gross neurologic deficits, CN II-XII intact, no facial asymmetry, no dysarthria, no dysmetria, no drift, strength equal in all four extremities, no gait abnormality Gen: aox3, NAD   Head: NCAT  ENT: Airway patent, moist mucous membranes, nasal passageways clear   Cardiac: Normal rate, normal rhythm   Respiratory: Lungs CTA B/L  Gastrointestinal: Abdomen soft, nontender, nondistended, no rebound, no guarding  MSK: No gross abnormalities, + small effusion over right suprapatellar region with ttp over quadriceps tendon and + ttp right anterior patella region, no joint laxity, 2+ DP/PT pulses RLE with cap refill < 2 sec, + ttp lumbar region   HEME: Extremities warm and well perfused   Skin: No rashes, no lesions  Neuro: No gross neurologic deficits

## 2025-01-23 NOTE — ED PROVIDER NOTE - ADDITIONAL NOTES AND INSTRUCTIONS:
Patient was seen for Right knee traumatic injury that requires time off from physical activity to allow for proper healing/recovery Patient was seen for Right knee traumatic injury that requires time off from physical activity to allow for proper healing/recovery. Patient is not permitted to travel during that time.

## 2025-01-24 ENCOUNTER — APPOINTMENT (OUTPATIENT)
Dept: MRI IMAGING | Facility: CLINIC | Age: 60
End: 2025-01-24
Payer: COMMERCIAL

## 2025-01-24 ENCOUNTER — NON-APPOINTMENT (OUTPATIENT)
Age: 60
End: 2025-01-24

## 2025-01-24 PROCEDURE — 73721 MRI JNT OF LWR EXTRE W/O DYE: CPT | Mod: RT

## 2025-01-27 ENCOUNTER — APPOINTMENT (OUTPATIENT)
Dept: ORTHOPEDIC SURGERY | Facility: CLINIC | Age: 60
End: 2025-01-27
Payer: COMMERCIAL

## 2025-01-27 VITALS — WEIGHT: 205 LBS | BODY MASS INDEX: 30.36 KG/M2 | HEIGHT: 69 IN

## 2025-01-27 DIAGNOSIS — M65.969 UNSP SYNOVITIS AND TENOSYNOVITIS, UNSPECIFIED LOWER LEG: ICD-10-CM

## 2025-01-27 DIAGNOSIS — M17.11 UNILATERAL PRIMARY OSTEOARTHRITIS, RIGHT KNEE: ICD-10-CM

## 2025-01-27 PROBLEM — S76.111D RUPTURE OF RIGHT QUADRICEPS TENDON, SUBSEQUENT ENCOUNTER: Status: ACTIVE | Noted: 2025-01-27

## 2025-01-27 PROCEDURE — 99214 OFFICE O/P EST MOD 30 MIN: CPT | Mod: 24

## 2025-01-27 RX ORDER — ASPIRIN/ACETAMINOPHEN/CAFFEINE 500-325-65
325 POWDER IN PACKET (EA) ORAL
Qty: 30 | Refills: 0 | Status: ACTIVE | COMMUNITY
Start: 2025-01-27 | End: 2025-02-26

## 2025-01-27 RX ORDER — OXYCODONE AND ACETAMINOPHEN 10; 325 MG/1; MG/1
10-325 TABLET ORAL
Qty: 30 | Refills: 0 | Status: COMPLETED | COMMUNITY
Start: 2025-01-27 | End: 2025-02-01

## 2025-01-29 ENCOUNTER — APPOINTMENT (OUTPATIENT)
Age: 60
End: 2025-01-29
Payer: COMMERCIAL

## 2025-01-29 PROCEDURE — 27385 REPAIR OF THIGH MUSCLE: CPT | Mod: AS,RT

## 2025-01-29 PROCEDURE — 27385 REPAIR OF THIGH MUSCLE: CPT | Mod: RT

## 2025-01-30 ENCOUNTER — NON-APPOINTMENT (OUTPATIENT)
Age: 60
End: 2025-01-30

## 2025-01-31 ENCOUNTER — APPOINTMENT (OUTPATIENT)
Dept: ORTHOPEDIC SURGERY | Facility: CLINIC | Age: 60
End: 2025-01-31

## 2025-02-06 ENCOUNTER — APPOINTMENT (OUTPATIENT)
Dept: ORTHOPEDIC SURGERY | Facility: CLINIC | Age: 60
End: 2025-02-06

## 2025-02-07 ENCOUNTER — NON-APPOINTMENT (OUTPATIENT)
Age: 60
End: 2025-02-07

## 2025-02-07 ENCOUNTER — APPOINTMENT (OUTPATIENT)
Dept: ORTHOPEDIC SURGERY | Facility: CLINIC | Age: 60
End: 2025-02-07
Payer: COMMERCIAL

## 2025-02-07 VITALS — HEIGHT: 69 IN | WEIGHT: 205 LBS | BODY MASS INDEX: 30.36 KG/M2

## 2025-02-07 PROCEDURE — 99024 POSTOP FOLLOW-UP VISIT: CPT

## 2025-02-07 RX ORDER — OXYCODONE AND ACETAMINOPHEN 5; 325 MG/1; MG/1
5-325 TABLET ORAL
Qty: 20 | Refills: 0 | Status: ACTIVE | COMMUNITY
Start: 2025-02-07 | End: 1900-01-01

## 2025-02-12 ENCOUNTER — APPOINTMENT (OUTPATIENT)
Dept: PAIN MANAGEMENT | Facility: CLINIC | Age: 60
End: 2025-02-12

## 2025-02-13 ENCOUNTER — APPOINTMENT (OUTPATIENT)
Dept: ORTHOPEDIC SURGERY | Facility: CLINIC | Age: 60
End: 2025-02-13
Payer: COMMERCIAL

## 2025-02-13 ENCOUNTER — NON-APPOINTMENT (OUTPATIENT)
Age: 60
End: 2025-02-13

## 2025-02-13 PROCEDURE — 99024 POSTOP FOLLOW-UP VISIT: CPT

## 2025-02-21 ENCOUNTER — APPOINTMENT (OUTPATIENT)
Dept: ORTHOPEDIC SURGERY | Facility: CLINIC | Age: 60
End: 2025-02-21
Payer: COMMERCIAL

## 2025-02-21 ENCOUNTER — NON-APPOINTMENT (OUTPATIENT)
Age: 60
End: 2025-02-21

## 2025-02-21 VITALS — BODY MASS INDEX: 30.36 KG/M2 | HEIGHT: 69 IN | WEIGHT: 205 LBS

## 2025-02-21 PROCEDURE — 99024 POSTOP FOLLOW-UP VISIT: CPT

## 2025-02-24 ENCOUNTER — APPOINTMENT (OUTPATIENT)
Dept: ORTHOPEDIC SURGERY | Facility: CLINIC | Age: 60
End: 2025-02-24
Payer: OTHER MISCELLANEOUS

## 2025-02-24 ENCOUNTER — NON-APPOINTMENT (OUTPATIENT)
Age: 60
End: 2025-02-24

## 2025-02-24 VITALS — WEIGHT: 205 LBS | BODY MASS INDEX: 30.36 KG/M2 | HEIGHT: 69 IN

## 2025-02-24 DIAGNOSIS — S83.242D OTHER TEAR OF MEDIAL MENISCUS, CURRENT INJURY, LEFT KNEE, SUBSEQUENT ENCOUNTER: ICD-10-CM

## 2025-02-24 DIAGNOSIS — S76.112D STRAIN OF LEFT QUADRICEPS MUSCLE, FASCIA AND TENDON, SUBSEQUENT ENCOUNTER: ICD-10-CM

## 2025-02-24 PROCEDURE — 99213 OFFICE O/P EST LOW 20 MIN: CPT | Mod: 24

## 2025-02-25 ENCOUNTER — APPOINTMENT (OUTPATIENT)
Dept: ORTHOPEDIC SURGERY | Facility: CLINIC | Age: 60
End: 2025-02-25
Payer: COMMERCIAL

## 2025-02-25 DIAGNOSIS — G56.02 CARPAL TUNNEL SYNDROME, LEFT UPPER LIMB: ICD-10-CM

## 2025-02-25 PROCEDURE — 99213 OFFICE O/P EST LOW 20 MIN: CPT

## 2025-02-28 ENCOUNTER — APPOINTMENT (OUTPATIENT)
Dept: ORTHOPEDIC SURGERY | Facility: CLINIC | Age: 60
End: 2025-02-28
Payer: COMMERCIAL

## 2025-02-28 ENCOUNTER — NON-APPOINTMENT (OUTPATIENT)
Age: 60
End: 2025-02-28

## 2025-02-28 PROCEDURE — 99024 POSTOP FOLLOW-UP VISIT: CPT

## 2025-02-28 RX ORDER — DICLOFENAC SODIUM 10 MG/G
1 GEL TOPICAL
Qty: 1 | Refills: 0 | Status: ACTIVE | COMMUNITY
Start: 2025-02-28 | End: 1900-01-01

## 2025-03-05 ENCOUNTER — APPOINTMENT (OUTPATIENT)
Dept: NEUROLOGY | Facility: CLINIC | Age: 60
End: 2025-03-05
Payer: COMMERCIAL

## 2025-03-05 PROCEDURE — 95886 MUSC TEST DONE W/N TEST COMP: CPT

## 2025-03-05 PROCEDURE — 95911 NRV CNDJ TEST 9-10 STUDIES: CPT

## 2025-03-06 ENCOUNTER — APPOINTMENT (OUTPATIENT)
Dept: ORTHOPEDIC SURGERY | Facility: CLINIC | Age: 60
End: 2025-03-06
Payer: COMMERCIAL

## 2025-03-06 VITALS — HEIGHT: 69 IN | BODY MASS INDEX: 30.36 KG/M2 | WEIGHT: 205 LBS

## 2025-03-06 DIAGNOSIS — S76.111D STRAIN OF RIGHT QUADRICEPS MUSCLE, FASCIA AND TENDON, SUBSEQUENT ENCOUNTER: ICD-10-CM

## 2025-03-06 PROCEDURE — 99024 POSTOP FOLLOW-UP VISIT: CPT

## 2025-03-11 ENCOUNTER — APPOINTMENT (OUTPATIENT)
Dept: ORTHOPEDIC SURGERY | Facility: CLINIC | Age: 60
End: 2025-03-11
Payer: COMMERCIAL

## 2025-03-11 VITALS — HEIGHT: 69 IN | WEIGHT: 205 LBS | BODY MASS INDEX: 30.36 KG/M2

## 2025-03-11 DIAGNOSIS — G56.02 CARPAL TUNNEL SYNDROME, LEFT UPPER LIMB: ICD-10-CM

## 2025-03-11 PROCEDURE — J3490M: CUSTOM

## 2025-03-11 PROCEDURE — 20526 THER INJECTION CARP TUNNEL: CPT | Mod: LT

## 2025-03-11 PROCEDURE — 99213 OFFICE O/P EST LOW 20 MIN: CPT | Mod: 24,25

## 2025-03-12 ENCOUNTER — APPOINTMENT (OUTPATIENT)
Dept: PAIN MANAGEMENT | Facility: CLINIC | Age: 60
End: 2025-03-12
Payer: COMMERCIAL

## 2025-03-12 VITALS — HEIGHT: 69 IN | WEIGHT: 205 LBS | BODY MASS INDEX: 30.36 KG/M2

## 2025-03-12 PROCEDURE — 99214 OFFICE O/P EST MOD 30 MIN: CPT

## 2025-03-12 RX ORDER — PREGABALIN 50 MG/1
50 CAPSULE ORAL
Qty: 60 | Refills: 0 | Status: ACTIVE | COMMUNITY
Start: 2025-03-12 | End: 1900-01-01

## 2025-03-14 ENCOUNTER — APPOINTMENT (OUTPATIENT)
Dept: PAIN MANAGEMENT | Facility: CLINIC | Age: 60
End: 2025-03-14
Payer: COMMERCIAL

## 2025-03-14 ENCOUNTER — NON-APPOINTMENT (OUTPATIENT)
Age: 60
End: 2025-03-14

## 2025-03-14 DIAGNOSIS — G90.512 COMPLEX REGIONAL PAIN SYNDROME I OF LEFT UPPER LIMB: ICD-10-CM

## 2025-03-14 PROCEDURE — J3490M: CUSTOM

## 2025-03-14 PROCEDURE — 64510 N BLOCK STELLATE GANGLION: CPT | Mod: LT

## 2025-03-21 ENCOUNTER — NON-APPOINTMENT (OUTPATIENT)
Age: 60
End: 2025-03-21

## 2025-03-21 ENCOUNTER — APPOINTMENT (OUTPATIENT)
Dept: ORTHOPEDIC SURGERY | Facility: CLINIC | Age: 60
End: 2025-03-21
Payer: COMMERCIAL

## 2025-03-21 VITALS — BODY MASS INDEX: 30.36 KG/M2 | WEIGHT: 205 LBS | HEIGHT: 69 IN

## 2025-03-21 DIAGNOSIS — S76.111D STRAIN OF RIGHT QUADRICEPS MUSCLE, FASCIA AND TENDON, SUBSEQUENT ENCOUNTER: ICD-10-CM

## 2025-03-21 PROCEDURE — 99024 POSTOP FOLLOW-UP VISIT: CPT

## 2025-03-31 ENCOUNTER — APPOINTMENT (OUTPATIENT)
Dept: ORTHOPEDIC SURGERY | Facility: CLINIC | Age: 60
End: 2025-03-31
Payer: COMMERCIAL

## 2025-03-31 ENCOUNTER — NON-APPOINTMENT (OUTPATIENT)
Age: 60
End: 2025-03-31

## 2025-03-31 DIAGNOSIS — S76.111D STRAIN OF RIGHT QUADRICEPS MUSCLE, FASCIA AND TENDON, SUBSEQUENT ENCOUNTER: ICD-10-CM

## 2025-03-31 PROCEDURE — 99024 POSTOP FOLLOW-UP VISIT: CPT

## 2025-04-16 ENCOUNTER — APPOINTMENT (OUTPATIENT)
Dept: PAIN MANAGEMENT | Facility: CLINIC | Age: 60
End: 2025-04-16

## 2025-04-17 ENCOUNTER — APPOINTMENT (OUTPATIENT)
Dept: ORTHOPEDIC SURGERY | Facility: CLINIC | Age: 60
End: 2025-04-17

## 2025-04-17 ENCOUNTER — NON-APPOINTMENT (OUTPATIENT)
Age: 60
End: 2025-04-17

## 2025-04-17 DIAGNOSIS — M17.11 UNILATERAL PRIMARY OSTEOARTHRITIS, RIGHT KNEE: ICD-10-CM

## 2025-04-17 DIAGNOSIS — M23.91 UNSPECIFIED INTERNAL DERANGEMENT OF RIGHT KNEE: ICD-10-CM

## 2025-04-17 PROCEDURE — 99024 POSTOP FOLLOW-UP VISIT: CPT

## 2025-04-17 PROCEDURE — 20611 DRAIN/INJ JOINT/BURSA W/US: CPT | Mod: 58,RT

## 2025-04-17 NOTE — H&P PST ADULT - PROBLEM SELECTOR PLAN 1
4 (moderate pain)
Patient is scheduled for Left foot arthroplasties of 2nd, 3rd, 4th and 5th digits with condylectomy on 12/19/2024  Written and oral preoperative instructions given to patient with understanding verbalized.    Instructions given to include using 4% chlorhexidine wash as directed starting 3days before day of surgery (inclusive of day of surgery)   Maintaining NPO status post midnight day before surgery   Stopping aspirin, NSAIDs, herbs, vitamins 7days before surgery    Patient is to expect a phone call day before surgery between 430-630pm, giving arrival time for surgery day.

## 2025-04-18 ENCOUNTER — APPOINTMENT (OUTPATIENT)
Dept: ORTHOPEDIC SURGERY | Facility: CLINIC | Age: 60
End: 2025-04-18

## 2025-04-18 DIAGNOSIS — S83.242D OTHER TEAR OF MEDIAL MENISCUS, CURRENT INJURY, LEFT KNEE, SUBSEQUENT ENCOUNTER: ICD-10-CM

## 2025-04-18 DIAGNOSIS — M17.12 UNILATERAL PRIMARY OSTEOARTHRITIS, LEFT KNEE: ICD-10-CM

## 2025-04-18 DIAGNOSIS — S76.112D STRAIN OF LEFT QUADRICEPS MUSCLE, FASCIA AND TENDON, SUBSEQUENT ENCOUNTER: ICD-10-CM

## 2025-04-18 PROCEDURE — 20611 DRAIN/INJ JOINT/BURSA W/US: CPT | Mod: 79,LT

## 2025-04-18 PROCEDURE — J3490M: CUSTOM

## 2025-04-18 PROCEDURE — 99213 OFFICE O/P EST LOW 20 MIN: CPT | Mod: 24,25

## 2025-04-22 ENCOUNTER — APPOINTMENT (OUTPATIENT)
Dept: ORTHOPEDIC SURGERY | Facility: CLINIC | Age: 60
End: 2025-04-22

## 2025-04-24 ENCOUNTER — APPOINTMENT (OUTPATIENT)
Dept: ORTHOPEDIC SURGERY | Facility: CLINIC | Age: 60
End: 2025-04-24

## 2025-04-24 DIAGNOSIS — S76.111D STRAIN OF RIGHT QUADRICEPS MUSCLE, FASCIA AND TENDON, SUBSEQUENT ENCOUNTER: ICD-10-CM

## 2025-04-24 DIAGNOSIS — M25.461 EFFUSION, RIGHT KNEE: ICD-10-CM

## 2025-04-24 PROCEDURE — 20610 DRAIN/INJ JOINT/BURSA W/O US: CPT | Mod: 58,RT

## 2025-04-24 RX ORDER — MELOXICAM 15 MG/1
15 TABLET ORAL
Qty: 30 | Refills: 0 | Status: ACTIVE | COMMUNITY
Start: 2025-04-24 | End: 2025-05-24

## 2025-05-14 ENCOUNTER — APPOINTMENT (OUTPATIENT)
Dept: MRI IMAGING | Facility: CLINIC | Age: 60
End: 2025-05-14
Payer: COMMERCIAL

## 2025-05-14 PROCEDURE — 73721 MRI JNT OF LWR EXTRE W/O DYE: CPT | Mod: RT

## 2025-05-19 ENCOUNTER — APPOINTMENT (OUTPATIENT)
Dept: ORTHOPEDIC SURGERY | Facility: CLINIC | Age: 60
End: 2025-05-19
Payer: COMMERCIAL

## 2025-05-19 VITALS — BODY MASS INDEX: 30.36 KG/M2 | WEIGHT: 205 LBS | HEIGHT: 69 IN

## 2025-05-19 DIAGNOSIS — S83.411D SPRAIN OF MEDIAL COLLATERAL LIGAMENT OF RIGHT KNEE, SUBSEQUENT ENCOUNTER: ICD-10-CM

## 2025-05-19 DIAGNOSIS — M17.11 UNILATERAL PRIMARY OSTEOARTHRITIS, RIGHT KNEE: ICD-10-CM

## 2025-05-19 DIAGNOSIS — S76.111D STRAIN OF RIGHT QUADRICEPS MUSCLE, FASCIA AND TENDON, SUBSEQUENT ENCOUNTER: ICD-10-CM

## 2025-05-19 PROCEDURE — 20611 DRAIN/INJ JOINT/BURSA W/US: CPT | Mod: RT

## 2025-05-19 PROCEDURE — 99213 OFFICE O/P EST LOW 20 MIN: CPT | Mod: 25

## 2025-05-19 PROCEDURE — J3490M: CUSTOM

## 2025-05-30 ENCOUNTER — APPOINTMENT (OUTPATIENT)
Dept: ORTHOPEDIC SURGERY | Facility: CLINIC | Age: 60
End: 2025-05-30

## 2025-06-09 ENCOUNTER — APPOINTMENT (OUTPATIENT)
Dept: ORTHOPEDIC SURGERY | Facility: CLINIC | Age: 60
End: 2025-06-09
Payer: COMMERCIAL

## 2025-06-09 PROCEDURE — 99213 OFFICE O/P EST LOW 20 MIN: CPT

## 2025-06-16 ENCOUNTER — APPOINTMENT (OUTPATIENT)
Dept: ORTHOPEDIC SURGERY | Facility: CLINIC | Age: 60
End: 2025-06-16
Payer: COMMERCIAL

## 2025-06-16 PROCEDURE — 20611 DRAIN/INJ JOINT/BURSA W/US: CPT | Mod: RT

## 2025-06-26 ENCOUNTER — APPOINTMENT (OUTPATIENT)
Dept: ORTHOPEDIC SURGERY | Facility: CLINIC | Age: 60
End: 2025-06-26
Payer: COMMERCIAL

## 2025-06-26 PROCEDURE — 20611 DRAIN/INJ JOINT/BURSA W/US: CPT | Mod: RT

## 2025-07-01 NOTE — ED PROVIDER NOTE - MUSCULOSKELETAL MINIMAL EXAM
Able to wiggle fingers. +redness and swelling of dorsal middle finger in between MCP joint and PIP. Limited flexion of finger due to pain/swelling. +flexion. Sensation intact. Pulse 2+. Capillary refill <2 seconds. No steaking or crepitus. +induration demarcated with surgical pen
.

## 2025-07-03 ENCOUNTER — APPOINTMENT (OUTPATIENT)
Dept: ORTHOPEDIC SURGERY | Facility: CLINIC | Age: 60
End: 2025-07-03

## 2025-07-03 PROCEDURE — 20611 DRAIN/INJ JOINT/BURSA W/US: CPT | Mod: RT

## 2025-07-09 ENCOUNTER — APPOINTMENT (OUTPATIENT)
Dept: PAIN MANAGEMENT | Facility: CLINIC | Age: 60
End: 2025-07-09
Payer: COMMERCIAL

## 2025-07-09 VITALS — BODY MASS INDEX: 30.36 KG/M2 | HEIGHT: 69 IN | WEIGHT: 205 LBS

## 2025-07-09 PROCEDURE — 99214 OFFICE O/P EST MOD 30 MIN: CPT

## 2025-07-24 ENCOUNTER — APPOINTMENT (OUTPATIENT)
Dept: ORTHOPEDIC SURGERY | Facility: CLINIC | Age: 60
End: 2025-07-24
Payer: OTHER MISCELLANEOUS

## 2025-07-24 DIAGNOSIS — M65.969 UNSP SYNOVITIS AND TENOSYNOVITIS, UNSPECIFIED LOWER LEG: ICD-10-CM

## 2025-07-24 DIAGNOSIS — M17.12 UNILATERAL PRIMARY OSTEOARTHRITIS, LEFT KNEE: ICD-10-CM

## 2025-07-24 DIAGNOSIS — M23.92 UNSPECIFIED INTERNAL DERANGEMENT OF LEFT KNEE: ICD-10-CM

## 2025-07-24 DIAGNOSIS — S76.112D STRAIN OF LEFT QUADRICEPS MUSCLE, FASCIA AND TENDON, SUBSEQUENT ENCOUNTER: ICD-10-CM

## 2025-07-24 PROCEDURE — 99213 OFFICE O/P EST LOW 20 MIN: CPT

## 2025-07-29 NOTE — ED PROVIDER NOTE - CARE PLAN
Consider including a wound healing supplement for healing promotion:   Jesus Alberto, 1 packet (mixed into 8oz/240 mL fluid) taken twice daily for two weeks or until your wound is healed.  May order directly from [employer] website with this DISCOUNT Link:  [email or web address]    Expedite (by [employer]) - 1 per day; also contains 10 grams protein.    --------------------------------------  Adequate protein intake is important for wound healing. Consider taking one (1) to two (2) protein shakes daily for one month following discharge from hospital for muscle preservation and healing promotion. Protein shake should contain at least 18 grams protein per shake or more.     Consider a high protein/very low carbohydrate shake to avoid excess calories while still providing extra protein for healing. (i.e. Ensure Max, Premier Protein, Fairlife Core or similar generic/store-brand shakes)    Consider an unflavored protein powder (Vital Protein, UNJury, Isopure or similar store-brand powders) dissolved into hot/warm fluids like tea/coffee, broth, soups, etc. or added to beverage/soft foods of your choice.     Aim to eat 4-6 smaller, protein-containing meals/snacks daily if you have a smaller appetite to encourage calories and protein throughout the day.   Protein sources include eggs, chicken/turkey, fish, lean meats, dairy (if tolerated) - Greek yogurt has more protein than regular yogurt, nuts/nut butters, etc.    A dietitian can help personalize your nutrition plan if you are on a special diet or have difficulty swallowing.    Nutrition-Related Questions: Gillian (Dietitian)  Phone: [phone]   
Principal Discharge DX:	Abscess of arm

## 2025-07-31 ENCOUNTER — APPOINTMENT (OUTPATIENT)
Dept: ORTHOPEDIC SURGERY | Facility: CLINIC | Age: 60
End: 2025-07-31
Payer: COMMERCIAL

## 2025-07-31 VITALS — WEIGHT: 205 LBS | HEIGHT: 69 IN | BODY MASS INDEX: 30.36 KG/M2

## 2025-07-31 DIAGNOSIS — S76.111D STRAIN OF RIGHT QUADRICEPS MUSCLE, FASCIA AND TENDON, SUBSEQUENT ENCOUNTER: ICD-10-CM

## 2025-07-31 DIAGNOSIS — M17.11 UNILATERAL PRIMARY OSTEOARTHRITIS, RIGHT KNEE: ICD-10-CM

## 2025-07-31 DIAGNOSIS — M19.041 PRIMARY OSTEOARTHRITIS, RIGHT HAND: ICD-10-CM

## 2025-07-31 PROCEDURE — 99213 OFFICE O/P EST LOW 20 MIN: CPT

## 2025-08-01 ENCOUNTER — APPOINTMENT (OUTPATIENT)
Dept: PAIN MANAGEMENT | Facility: CLINIC | Age: 60
End: 2025-08-01
Payer: COMMERCIAL

## 2025-08-01 PROCEDURE — 64493 INJ PARAVERT F JNT L/S 1 LEV: CPT | Mod: RT

## 2025-08-01 PROCEDURE — 64494 INJ PARAVERT F JNT L/S 2 LEV: CPT | Mod: RT,59

## 2025-08-01 PROCEDURE — J3490M: CUSTOM

## 2025-08-01 PROCEDURE — 64495 INJ PARAVERT F JNT L/S 3 LEV: CPT | Mod: RT,59

## 2025-08-07 ENCOUNTER — APPOINTMENT (OUTPATIENT)
Dept: PAIN MANAGEMENT | Facility: CLINIC | Age: 60
End: 2025-08-07
Payer: COMMERCIAL

## 2025-08-07 DIAGNOSIS — M54.12 RADICULOPATHY, CERVICAL REGION: ICD-10-CM

## 2025-08-07 PROCEDURE — 62321 NJX INTERLAMINAR CRV/THRC: CPT

## 2025-08-18 ENCOUNTER — APPOINTMENT (OUTPATIENT)
Dept: PAIN MANAGEMENT | Facility: CLINIC | Age: 60
End: 2025-08-18
Payer: COMMERCIAL

## 2025-08-18 VITALS — HEIGHT: 69 IN | WEIGHT: 205 LBS | BODY MASS INDEX: 30.36 KG/M2

## 2025-08-18 DIAGNOSIS — M47.817 SPONDYLOSIS W/OUT MYELOPATHY OR RADICULOPATHY, LUMBOSACRAL REGION: ICD-10-CM

## 2025-08-18 PROCEDURE — 99214 OFFICE O/P EST MOD 30 MIN: CPT

## 2025-09-04 ENCOUNTER — APPOINTMENT (OUTPATIENT)
Dept: ORTHOPEDIC SURGERY | Facility: CLINIC | Age: 60
End: 2025-09-04
Payer: OTHER MISCELLANEOUS

## 2025-09-04 DIAGNOSIS — M17.12 UNILATERAL PRIMARY OSTEOARTHRITIS, LEFT KNEE: ICD-10-CM

## 2025-09-04 DIAGNOSIS — S83.242D OTHER TEAR OF MEDIAL MENISCUS, CURRENT INJURY, LEFT KNEE, SUBSEQUENT ENCOUNTER: ICD-10-CM

## 2025-09-04 DIAGNOSIS — S76.112D STRAIN OF LEFT QUADRICEPS MUSCLE, FASCIA AND TENDON, SUBSEQUENT ENCOUNTER: ICD-10-CM

## 2025-09-04 PROCEDURE — 99213 OFFICE O/P EST LOW 20 MIN: CPT

## (undated) DEVICE — SUT PROLENE 4-0 18" PS-2

## (undated) DEVICE — BLADE SURGICAL #15 CARBON

## (undated) DEVICE — GLV 7.5 PROTEXIS (WHITE)

## (undated) DEVICE — STAPLER SKIN MULTI DIRECTION W35

## (undated) DEVICE — SAW BLADE MICROAIRE SAGITTAL 9.5MMX25.4MMX0.6MM

## (undated) DEVICE — DRAPE TOWEL BLUE 17" X 24"

## (undated) DEVICE — DRSG ACE BANDAGE 6"

## (undated) DEVICE — TOURNIQUET ESMARK 6"

## (undated) DEVICE — BEAVER BLADE MIN-BLADE ROUNDED TIP 1-SIDE SHARP (GREEN)

## (undated) DEVICE — DRSG STOCKINETTE CLOTH 6 X 60"

## (undated) DEVICE — WARMING BLANKET FULL ADULT

## (undated) DEVICE — DRSG STERISTRIPS 0.5 X 4"

## (undated) DEVICE — PREP BETADINE KIT

## (undated) DEVICE — DRAPE LIGHT HANDLE COVER (BLUE)

## (undated) DEVICE — DRSG KLING 4"

## (undated) DEVICE — WARMING BLANKET UPPER ADULT

## (undated) DEVICE — NDL HYPO SAFE 25G X 1.5" (ORANGE)

## (undated) DEVICE — SUT VICRYL PLUS 2-0 27" SH UNDYED

## (undated) DEVICE — PACK MINOR NO DRAPE

## (undated) DEVICE — CATH IV SAFE INSYTE 18G X 1.16" (GREEN)

## (undated) DEVICE — FOR-TOURNIQUET 27679911: Type: DURABLE MEDICAL EQUIPMENT

## (undated) DEVICE — DRSG ADAPTIC 3 X 8"

## (undated) DEVICE — TOURNIQUET ESMARK 4"

## (undated) DEVICE — SUT POLYSORB 2-0 18" V-20

## (undated) DEVICE — DRSG COBAN 4"

## (undated) DEVICE — SUT POLYSORB 0 36" GS-21 UNDYED

## (undated) DEVICE — DRAPE EXTREMITY 87" X 128.5"

## (undated) DEVICE — SUT POLYSORB 3-0 18" P-12 UNDYED

## (undated) DEVICE — DRSG MASTISOL

## (undated) DEVICE — BLADE SURGICAL #10 CARBON

## (undated) DEVICE — FOR-ESU VALLEYLAB T7E15009DX: Type: DURABLE MEDICAL EQUIPMENT

## (undated) DEVICE — ELCTR GROUNDING PAD ADULT COVIDIEN

## (undated) DEVICE — SYR LUER LOK 20CC

## (undated) DEVICE — DRSG KLING 3"

## (undated) DEVICE — DRSG GAUZE MOISTURIZER 0.5 OZ 4X8

## (undated) DEVICE — PACK ORTHO

## (undated) DEVICE — DRAPE 1/2 SHEET 40X57"

## (undated) DEVICE — VENODYNE/SCD SLEEVE CALF MEDIUM

## (undated) DEVICE — DRAPE 3/4 SHEET W REINFORCEMENT 56X77"

## (undated) DEVICE — SUT VICRYL 4-0 27" SH UNDYED

## (undated) DEVICE — DRSG CURITY GAUZE SPONGE 4 X 4" 12-PLY

## (undated) DEVICE — DRAPE U (BLUE) 60 X 72"

## (undated) DEVICE — GOWN XL

## (undated) DEVICE — SOL IRR POUR NS 0.9% 1500ML

## (undated) DEVICE — SUT ETHILON 4-0 18" PS-2